# Patient Record
Sex: FEMALE | Race: WHITE | NOT HISPANIC OR LATINO | Employment: FULL TIME | URBAN - METROPOLITAN AREA
[De-identification: names, ages, dates, MRNs, and addresses within clinical notes are randomized per-mention and may not be internally consistent; named-entity substitution may affect disease eponyms.]

---

## 2017-09-27 NOTE — TELEPHONE ENCOUNTER
Last OV was 8/19/16. TC to patient. Patient did not request refill.  [facility].   Hafsa Heart RN-BSN

## 2018-06-18 ENCOUNTER — OFFICE VISIT (OUTPATIENT)
Dept: MIDWIFE SERVICES | Facility: CLINIC | Age: 38
End: 2018-06-18
Payer: COMMERCIAL

## 2018-06-18 VITALS
TEMPERATURE: 97.9 F | BODY MASS INDEX: 28.29 KG/M2 | HEART RATE: 80 BPM | WEIGHT: 170 LBS | DIASTOLIC BLOOD PRESSURE: 79 MMHG | SYSTOLIC BLOOD PRESSURE: 129 MMHG

## 2018-06-18 DIAGNOSIS — Z30.09 FAMILY PLANNING COUNSELING: Primary | ICD-10-CM

## 2018-06-18 PROCEDURE — 99213 OFFICE O/P EST LOW 20 MIN: CPT | Performed by: ADVANCED PRACTICE MIDWIFE

## 2018-06-18 NOTE — NURSING NOTE
"Chief Complaint   Patient presents with     Family Planning       Initial /79  Pulse 80  Temp 97.9  F (36.6  C) (Oral)  Wt 170 lb (77.1 kg)  BMI 28.29 kg/m2 Estimated body mass index is 28.29 kg/(m^2) as calculated from the following:    Height as of 16: 5' 5\" (1.651 m).    Weight as of this encounter: 170 lb (77.1 kg).  BP completed using cuff size: regular        The following HM Due: NONE      The following patient reported/Care Every where data was sent to:  P ABSTRACT QUALITY INITIATIVES [36418]  na      n/a              "

## 2018-06-18 NOTE — MR AVS SNAPSHOT
"              After Visit Summary   2018    Laurie Diaz    MRN: [id number]           Patient Information     Date Of Birth          1980        Visit Information        Provider Department      2018 3:30 PM Joanne Spencer APRN CNM [facility]        Today's Diagnoses     Family planning counseling    -  1       Follow-ups after your visit        Who to contact     If you have questions or need follow up information about today's clinic visit or your schedule please contact [facility] directly at [phone].  Normal or non-critical lab and imaging results will be communicated to you by Dokkankomhart, letter or phone within 4 business days after the clinic has received the results. If you do not hear from us within 7 days, please contact the clinic through Anatexist or phone. If you have a critical or abnormal lab result, we will notify you by phone as soon as possible.  Submit refill requests through StreetHawk or call your pharmacy and they will forward the refill request to us. Please allow 3 business days for your refill to be completed.          Additional Information About Your Visit        MyChart Information     StreetHawk lets you send messages to your doctor, view your test results, renew your prescriptions, schedule appointments and more. To sign up, go to [email or web address] . Click on \"Log in\" on the left side of the screen, which will take you to the Welcome page. Then click on \"Sign up Now\" on the right side of the page.     You will be asked to enter the access code listed below, as well as some personal information. Please follow the directions to create your username and password.     Your access code is: [email or web address]  Expires: 2018  3:22 PM     Your access code will  in 90 days. If you need help or a new code, please call your [facility] or [phone].        Care EveryWhere ID     This is your Care EveryWhere ID. This could be used " by other organizations to access your [place] medical records  [id number]        Your Vitals Were     Pulse Temperature BMI (Body Mass Index)             80 97.9  F (36.6  C) (Oral) 28.29 kg/m2          Blood Pressure from Last 3 Encounters:   06/18/18 129/79   08/19/16 108/70   07/11/16 116/80    Weight from Last 3 Encounters:   06/18/18 170 lb (77.1 kg)   08/19/16 169 lb 9.6 oz (76.9 kg)   07/05/16 193 lb (87.5 kg)              Today, you had the following     No orders found for display       Primary Care Provider Office Phone # Fax #    [facility] [phone]       [address] [place] [address]        Equal Access to Services     JAYNE CANDELARIO : Macario doll Soreji, waaxda luqadaha, qaybta kaalmada adepreetyakavita, tere smallwood . So [facility] [phone].    ATENCIÓN: Si habla español, tiene a fleming disposición servicios gratuitos de asistencia lingüística. Mike al [phone].    We comply with applicable federal civil rights laws and [place] laws. We do not discriminate on the basis of race, color, national origin, age, disability, sex, sexual orientation, or gender identity.            Thank you!     Thank you for choosing [facility]  for your care. Our goal is always to provide you with excellent care. Hearing back from our patients is one way we can continue to improve our services. Please take a few minutes to complete the written survey that you may receive in the mail after your visit with us. Thank you!             Your Updated Medication List - Protect others around you: Learn how to safely use, store and throw away your medicines at www.disposemymeds.org.          This list is accurate as of 6/18/18 11:59 PM.  Always use your most recent med list.                   Brand Name Dispense Instructions for use Diagnosis    levothyroxine 25 MCG tablet    SYNTHROID/LEVOTHROID    90 tablet    Take 1 tablet (25 mcg)  by mouth daily    Congenital hypothyroidism with diffuse goiter       prenatal multivitamin plus iron 27-0.8 MG Tabs per tablet      Take 1 tablet by mouth daily

## 2018-06-18 NOTE — PROGRESS NOTES
"Laurie Diaz is a 37 year old woman who presents to the clinic for a discussion regarding family planning. She reports having increased anxiety and not knowing what is safe or unsafe during the conception period. She recently was gardening and then learned that she could get toxoplasmosis from dirt. She would like to know if she should be tested for such. Also what else should she avoid. She has a daughter who is two years old and had a normal healthy pregnancy and delivery with her. She states that she \"doesn't know why she is so worried with this one\". Last time she conceived in the first cycle. She has been trying to conceive for 2 or 3 cycles. She would like to know how long she should try before seeing someone for fertility and \"does fertility really drop off at age 37?\"  She is charting her menstrual cycle and it has been regular. She is taking a prenatal vitamin. Neither she or her  have been in any areas where the Zika virus is prevalent.    O: /79  Pulse 80  Temp 97.9  F (36.6  C) (Oral)  Wt 170 lb (77.1 kg)  BMI 28.29 kg/m2    No physical exam today. She is due for her pap next month but as she is in her fertile week and trying to conceive, she would like to defer until a later time.       A: Conception counseling.     P: We discussed the small risk of toxoplasmosis. Other than gardening she has no other risk factors. I did not recommend serologic tests at this point but did recommend using gardening gloves and washing her hands afterwards. Otherwise we discussed strategies to decrease the likelihood of food borne illnesses, exercise, fish consumption, that it was safe to drink moderate amounts of alcohol until a positive pregnancy test and the importance of folic acid and she will continue taking her PNVs. We discussed Zika. I recommended that she chart her cycle and consider an ovulation kit. If she has not had a positive UPT after 6 months of trying she should see one of our OB/GYNs for " a fertility consult. She should call the clinic with a positive UPT. We also discussed pregnancy after 35 yrs old and early screening tests and a level 2 ultrasound.     Joanne Spencer CNM

## 2018-09-10 ENCOUNTER — TELEPHONE (OUTPATIENT)
Dept: OBGYN | Facility: CLINIC | Age: 38
End: 2018-09-10

## 2018-09-10 ENCOUNTER — TELEPHONE (OUTPATIENT)
Dept: MIDWIFE SERVICES | Facility: CLINIC | Age: 38
End: 2018-09-10

## 2018-09-10 NOTE — TELEPHONE ENCOUNTER
Patient calling regarding inhaling windshield washer fluid that had spilled in her car x 1 week in pregnancy - approximately 5 weeks (has not been seen yet for care). Did research online and stated that it was toxic for her and could cause birth defects in baby. Explained to her as long as he is not currently having symptoms: HA, visual changes, respiratory problems, lightheadedness okay to monitor. D/t stage of pregnancy, most likely not harmful, but will send a message to on call midwife for recommendations. Please advise. Thanks!  Sweta Peñaloza

## 2018-09-10 NOTE — TELEPHONE ENCOUNTER
Called Laurie regarding concerns about inhaling spilled windshield washer fluid.  She is feeling well.  We discussed that from my research it is mostly alcohol and has to be filtered through many of the body's systems to get to the developing baby.  Especially if she was feeling no ill effects it is unlikely to reach/effect the baby.  She verbalized understanding and relief.  I asked her to please call back if she continues to feel worried.

## 2018-09-18 ENCOUNTER — MYC MEDICAL ADVICE (OUTPATIENT)
Dept: MIDWIFE SERVICES | Facility: CLINIC | Age: 38
End: 2018-09-18

## 2018-09-18 NOTE — TELEPHONE ENCOUNTER
From: Laurie Diaz  To: Thalia Villalobos APRN CNM  Sent: 9/18/2018 9:55 AM CDT  Subject: Question about an upcoming visit    Hello,     I am sorry to be a bother. I have my first prenatal appointment tomorrow with the nurse. I just found out about congenital cmv and I am freaking out. I have a two year old at home with whom I share food and drinks. (but I will stop now) Could I please have blood work done tomorrow when I am there to check for cmv antibodies? Knowing if I have already been exposed and am immune or if I may have had a recent infection would really help me feel more comfortable and I would greatly appreciate it. I of course will discuss my concerns with the nurse tomorrow. I really hope this is possible, please let me know. Thank you,

## 2018-09-19 ENCOUNTER — PRENATAL OFFICE VISIT (OUTPATIENT)
Dept: NURSING | Facility: CLINIC | Age: 38
End: 2018-09-19
Payer: COMMERCIAL

## 2018-09-19 VITALS
HEART RATE: 80 BPM | TEMPERATURE: 98.4 F | DIASTOLIC BLOOD PRESSURE: 78 MMHG | WEIGHT: 168.3 LBS | SYSTOLIC BLOOD PRESSURE: 124 MMHG | HEIGHT: 65 IN | BODY MASS INDEX: 28.04 KG/M2

## 2018-09-19 DIAGNOSIS — F41.9 ANXIETY: ICD-10-CM

## 2018-09-19 DIAGNOSIS — Z23 NEED FOR TDAP VACCINATION: ICD-10-CM

## 2018-09-19 DIAGNOSIS — O09.529 AMA (ADVANCED MATERNAL AGE) MULTIGRAVIDA 35+: ICD-10-CM

## 2018-09-19 LAB
ABO + RH BLD: NORMAL
ABO + RH BLD: NORMAL
ALBUMIN UR-MCNC: NEGATIVE MG/DL
APPEARANCE UR: CLEAR
BETA HCG QUAL IFA URINE: POSITIVE
BILIRUB UR QL STRIP: NEGATIVE
BLD GP AB SCN SERPL QL: NORMAL
BLOOD BANK CMNT PATIENT-IMP: NORMAL
COLOR UR AUTO: YELLOW
ERYTHROCYTE [DISTWIDTH] IN BLOOD BY AUTOMATED COUNT: 12.5 % (ref 10–15)
GLUCOSE UR STRIP-MCNC: NEGATIVE MG/DL
HCT VFR BLD AUTO: 38.8 % (ref 35–47)
HGB BLD-MCNC: 13.2 G/DL (ref 11.7–15.7)
HGB UR QL STRIP: NEGATIVE
KETONES UR STRIP-MCNC: NEGATIVE MG/DL
LEUKOCYTE ESTERASE UR QL STRIP: NEGATIVE
MCH RBC QN AUTO: 31.3 PG (ref 26.5–33)
MCHC RBC AUTO-ENTMCNC: 34 G/DL (ref 31.5–36.5)
MCV RBC AUTO: 92 FL (ref 78–100)
NITRATE UR QL: NEGATIVE
PH UR STRIP: 6.5 PH (ref 5–7)
PLATELET # BLD AUTO: 303 10E9/L (ref 150–450)
RBC # BLD AUTO: 4.22 10E12/L (ref 3.8–5.2)
SOURCE: NORMAL
SP GR UR STRIP: <=1.005 (ref 1–1.03)
SPECIMEN EXP DATE BLD: NORMAL
UROBILINOGEN UR STRIP-ACNC: 0.2 EU/DL (ref 0.2–1)
WBC # BLD AUTO: 10.8 10E9/L (ref 4–11)

## 2018-09-19 PROCEDURE — 86900 BLOOD TYPING SEROLOGIC ABO: CPT | Performed by: ADVANCED PRACTICE MIDWIFE

## 2018-09-19 PROCEDURE — 84443 ASSAY THYROID STIM HORMONE: CPT | Performed by: ADVANCED PRACTICE MIDWIFE

## 2018-09-19 PROCEDURE — 86850 RBC ANTIBODY SCREEN: CPT | Performed by: ADVANCED PRACTICE MIDWIFE

## 2018-09-19 PROCEDURE — 87086 URINE CULTURE/COLONY COUNT: CPT | Performed by: ADVANCED PRACTICE MIDWIFE

## 2018-09-19 PROCEDURE — 87389 HIV-1 AG W/HIV-1&-2 AB AG IA: CPT | Performed by: ADVANCED PRACTICE MIDWIFE

## 2018-09-19 PROCEDURE — 86901 BLOOD TYPING SEROLOGIC RH(D): CPT | Performed by: ADVANCED PRACTICE MIDWIFE

## 2018-09-19 PROCEDURE — 86644 CMV ANTIBODY: CPT | Performed by: ADVANCED PRACTICE MIDWIFE

## 2018-09-19 PROCEDURE — 36415 COLL VENOUS BLD VENIPUNCTURE: CPT | Performed by: ADVANCED PRACTICE MIDWIFE

## 2018-09-19 PROCEDURE — 86780 TREPONEMA PALLIDUM: CPT | Performed by: ADVANCED PRACTICE MIDWIFE

## 2018-09-19 PROCEDURE — 99207 ZZC NO CHARGE NURSE ONLY: CPT

## 2018-09-19 PROCEDURE — 85027 COMPLETE CBC AUTOMATED: CPT | Performed by: ADVANCED PRACTICE MIDWIFE

## 2018-09-19 PROCEDURE — 84703 CHORIONIC GONADOTROPIN ASSAY: CPT | Performed by: ADVANCED PRACTICE MIDWIFE

## 2018-09-19 PROCEDURE — 87340 HEPATITIS B SURFACE AG IA: CPT | Performed by: ADVANCED PRACTICE MIDWIFE

## 2018-09-19 PROCEDURE — 81003 URINALYSIS AUTO W/O SCOPE: CPT | Performed by: ADVANCED PRACTICE MIDWIFE

## 2018-09-19 PROCEDURE — 86762 RUBELLA ANTIBODY: CPT | Performed by: ADVANCED PRACTICE MIDWIFE

## 2018-09-19 NOTE — MR AVS SNAPSHOT
After Visit Summary   9/19/2018    Laurie Diaz    MRN: [id number]           Patient Information     Date Of Birth          1980        Visit Information        Provider Department      9/19/2018 2:30 PM RD OB NURSE EDUCATION [facility]        Today's Diagnoses     AMA (advanced maternal age) multigravida 35+        Need for Tdap vaccination        Anxiety           Follow-ups after your visit        Additional Services     MAT FETAL MED CTR REFERRAL-PREGNANCY       Body mass index is 28.01 kg/(m^2).    >> Patient may proceed with recommendations for further testing as directed by the Maternal Fetal Medicine Specialist >>    >> If requesting Fetal Echo: MFM will determine appropriate location for exam due to indication.    >> If requesting Lung Maturity Amnio:  If results indicate fetal lung maturity, induction or C/S is recommended within 36 hours.  Please schedule accordingly.     Dear Patient:   Please be aware that coverage of these services is subject to the terms and limitations of your health insurance plan.  Call member services at your health plan with any benefit or coverage questions.      Please bring the following to your appointment:    >>  Any x-rays, CTs or MRIs which have been performed.  Contact the facility where they were done to arrange for  prior to your scheduled appointment.  Any new CT, MRI or other procedures ordered by your specialist must be performed at a [place] facility or coordinated by your clinic's referral office.  >>  List of current medications   >>  This referral request   >>  Any documents/labs given to you for this referral                  Your next 10 appointments already scheduled     Oct 03, 2018  4:00 PM CDT   US OB < 14 WEEKS SINGLE with RDUS1   [facility] ([facility])    [address] [place] [address]  [address] [place] [id number] [phone]           How do I prepare for my exam? (Food and drink  instructions) Drink four 8-ounce glasses of fluid an hour before your exam. If you need to empty your bladder before your exam, try to release only a little urine. Then, drink another glass of fluid.  How do I prepare for my exam? (Other instructions) You may have up to two family members in the exam room. If you bring a small child, an adult must be there to care for him or her. No video or camera photography during the procedure.  What should I wear: Wear comfortable clothes.  How long does the exam take: Most ultrasounds take 30 to 60 minutes.  What should I bring: Bring a list of your medicines, including vitamins, minerals and over-the-counter drugs. It is safest to leave personal items at home.  Do I need a :  No  is needed.  What do I need to tell my doctor: Tell your doctor about any allergies you may have.  What should I do after the exam: No restrictions, You may resume normal activities.  What is this test: An ultrasound uses sound waves to make pictures of the body. Sound waves do not cause pain. The only discomfort may be the pressure of the wand against your skin or full bladder.  Who should I call with questions: If you have any questions, please call the Imaging Department where you will have your exam. Directions, parking instructions, and other information is available on our website, [email or web address].            Oct 03, 2018  4:45 PM CDT   SHORT with NIRAJ Aaron CNM   [facility]    [address] [place] [address] [address]  [facility] [id number] [phone]            Oct 17, 2018  3:45 PM CDT   New Prenatal with NIRAJ Darden CNM   [facility] ([facility]    [address] [place] [address] [address]  [facility] [id number] [phone]              Future tests that were ordered for you today     Open Future Orders        Priority Expected Expires Ordered    US OB < 14 Weeks Single  "Routine  9/19/2019 9/19/2018            Who to contact     If you have questions or need follow up information about today's clinic visit or your schedule please contact [facility] directly at [phone].  Normal or non-critical lab and imaging results will be communicated to you by MyChart, letter or phone within 4 business days after the clinic has received the results. If you do not hear from us within 7 days, please contact the clinic through Saranashart or phone. If you have a critical or abnormal lab result, we will notify you by phone as soon as possible.  Submit refill requests through Theorem or call your pharmacy and they will forward the refill request to us. Please allow 3 business days for your refill to be completed.          Additional Information About Your Visit        Saranashart Information     Theorem gives you secure access to your electronic health record. If you see a primary care provider, you can also send messages to your care team and make appointments. If you have questions, please call your primary care clinic.  If you do not have a primary care provider, please call [phone] and they will assist you.        Care EveryWhere ID     This is your Care EveryWhere ID. This could be used by other organizations to access your [place] medical records  [id number]        Your Vitals Were     Pulse Temperature Height Last Period Breastfeeding? BMI (Body Mass Index)    80 98.4  F (36.9  C) 5' 5\" (1.651 m) 08/01/2018 No 28.01 kg/m2       Blood Pressure from Last 3 Encounters:   09/19/18 124/78   06/18/18 129/79   08/19/16 108/70    Weight from Last 3 Encounters:   09/19/18 168 lb 4.8 oz (76.3 kg)   06/18/18 170 lb (77.1 kg)   08/19/16 169 lb 9.6 oz (76.9 kg)              We Performed the Following     ABO/Rh type and screen     Beta HCG Qual, Urine - [facility] and [place] ([id number])     CBC with platelets     CMV Antibody IgG     Hepatitis B surface antigen     HIV Antigen Antibody " Combo     MAT FETAL MED CTR REFERRAL-PREGNANCY     Rubella Antibody IgG Quantitative     Treponema Abs w Reflex to RPR and Titer     TSH with free T4 reflex     UA without Microscopic     Urine Culture Aerobic Bacterial        Primary Care Provider Office Phone # Fax #    [facility] [phone]       [address] [address]        Equal Access to Services     [facility] FATUMAR : Hadii aad ku hadasho Soomaali, waaxda luqadaha, qaybta kaalmada adeegyada, waxay idiin hayaan adeeg kharash la'aan . So [facility] [phone].    ATENCIÓN: Si habla español, tiene a fleming disposición servicios gratuitos de asistencia lingüística. Mike al [phone].    We comply with applicable federal civil rights laws and [place] laws. We do not discriminate on the basis of race, color, national origin, age, disability, sex, sexual orientation, or gender identity.            Thank you!     Thank you for choosing [facility]  for your care. Our goal is always to provide you with excellent care. Hearing back from our patients is one way we can continue to improve our services. Please take a few minutes to complete the written survey that you may receive in the mail after your visit with us. Thank you!             Your Updated Medication List - Protect others around you: Learn how to safely use, store and throw away your medicines at www.disposemymeds.org.          This list is accurate as of 9/19/18  3:17 PM.  Always use your most recent med list.                   Brand Name Dispense Instructions for use Diagnosis    levothyroxine 25 MCG tablet    SYNTHROID/LEVOTHROID    90 tablet    Take 1 tablet (25 mcg) by mouth daily    Congenital hypothyroidism with diffuse goiter       prenatal multivitamin plus iron 27-0.8 MG Tabs per tablet      Take 1 tablet by mouth daily

## 2018-09-19 NOTE — PROGRESS NOTES
Important Information for Provider:     Patient presents for new ob teaching and labs, second pregnancy, AMA. Ordered first  trimester screening. Patient had some spotting before last period. Ultrasound scheduled for 10/03/18 with CNM to review after. NOB visit 10/17/18 with CNM. Patient extremely high anxiety with this pregnancy, answered all questions, recommended the book Panic Free Pregnancy. TSH drawn today with NOB labs    Caffeine intake/servings daily - 0  Calcium intake/servings daily - 3  Exercise 5 times weekly - describe ; walks, precautions given  Sunscreen used - Yes  Seatbelts used - Yes  Guns stored in the home - No  Self Breast Exam - Yes  Pap test up to date -  Yes  Eye exam up to date -  Yes  Dental exam up to date -  Yes  Immunizations reviewed and up to date - Yes  Abuse: Current or Past (Physical, Sexual or Emotional) - No  Do you feel safe in your environment - Yes  Do you cope well with stress - No  Do you suffer from insomnia - No    .       Prenatal OB Questionnaire  Patient supplied answers from flow sheet for:  Prenatal OB Questionnaire.  Past Medical History  Diabetes?: No  Hypertension : No  Heart disease, mitral valve prolapse or rheumatic fever?: No  An autoimmune disease such as lupus or rheumatoid arthritis?: No  Kidney disease or urinary tract infection?: No  Epilepsy, seizures or spells?: No  Migraine headaches?: No  A stroke or loss of function or sensation?: No  Any other neurological problems?: No  Have you ever been treated for depression?: No  Are you having problems with crying spells or loss of self-esteem?: No  Have you ever required psychiatric care?: No  Have you ever had hepatitis, liver disease or jaundice?: No  Have you been treated for blood clots in your veins, deep vein thrombosis, inflammation in the veins, thrombosis, phlebitis, pulmonary embolism or varicosities?: No  Have you had excessive bleeding after surgery or dental work?: No  Do you bleed more than  other women after a cut or scratch?: No  Do you have a history of anemia?: No  Have you ever had thyroid problems or taken thyroid medication?: (!) Yes (hypothyroidism, takes levothyroxine)  Have you ever been in a major accident or suffered serious trauma?: No  Within the last year, has anyone hit, slapped, kicked or otherwise hurt you?: No  In the last year, has anyone forced you to have sex when you didn't want to?: No    Past Medical History 2   Have you ever received a blood transfusion?: No  Would you refuse a blood transfusion if a doctor judged it to be medically necessary?: No   If you answered Yes, would you rather die than receive a blood transfusion?: No  If you answered Yes, is this for Baptism reasons?: No  Does anyone in your home smoke?: (!) Yes  Do you use tobacco products?: No  Do you drink beer, wine or hard liquor?: No  Do you use any of the following: marijuana, speed, cocaine, heroin, hallucinogens or other drugs?: No   Is your blood type Rh negative?: No  Have you ever had abnormal antibodies in your blood?: No  Have you ever had asthma?: No  Have you ever had tuberculosis?: No  Do you have any allergies to drugs or over-the-counter medications?: No  Allergies: Dust Mites, Aspartame, Ethanol, Venlafaxine, Hydrochloride, Sertraline: No  Have you had any breast problems?: No  Have you ever ?: (!) Yes  Have you had any gynecological surgical procedures such as cervical conization, a LEEP procedure, laser treatment, cryosurgery of the cervix or a dilation and curettage, etc?: No  Have you ever had any other surgical procedures?: No  Have you been hospitalized for a nonsurgical reason excluding normal delivery?: No  Have you ever had any anesthetic complications?: No  Have you ever had an abnormal pap smear?: (!) Yes    Past Medical History (Continued)  Do you have a history of abnormalities of the uterus?: No  Did your mother take KAROL or any other hormones when she was pregnant with  you?: No  Did it take you more than a year to become pregnant?: No  Have you ever been evaluated or treated for infertility?: No  Is there a history of medical problems in your family, which you feel may be important to this pregnancy?: No  Do you have any other problems we have not asked about which you feel may be important to this pregnancy?: No    Symptoms since last menstrual period  Do you have any of the following symptoms: abdominal pain, blood in stools or urine, chest pain, shortness of breath, coughing or vomiting up blood, your heart racing or skipping beats, nausea and vomiting, pain on urination or vaginal discharge or bleed: No  Will the patient be 35 years old or older at the time of delivery?: (!) Yes    Has the patient, baby's father or anyone in either family had:  Thalassemia (Italian, Greek, Mediterranean or  background only) and an MCV result less than 80?: No  Neural tube defect such as meningomyelocele, spina bifida or anencephaly?: No  Congenital heart defect?: No  Down's Syndrome?: No  Rudy-Sachs disease (Latter day, Cajun, Afghan-Malawian)?: No  Sickle cell disease or trait ()?: No  Hemophilia or other inherited problems of blood?: No  Muscular dystrophy?: No  Cystic fibrosis?: No  Estero's chorea?: No  Mental retardation/autism?: (!) Yes ('s brother is mentally challenged)  If yes, was the person tested for fragile X?: No  Any other inherited genetic or chromosomal disorder?: No  Maternal metabolic disorder (e.g Insulin-dependent diabetes, PKU)?: No  A child with birth defects not listed above?: No  Recurrent pregnancy loss or stillbirth?: No   Has the patient had any medications/street drugs/alcohol since her last menstrual period?: No  Does the patient or baby's father have any other genetic risks?: No    Infection History   Do you object to being tested for Hepatitis B?: No  Do you object to being tested for HIV?: No   Do you feel that you are at high risk for coming  in contact with the AIDS virus?: No  Have you ever been treated for tuberculosis?: No  Have you ever had a positive skin test for tuberculosis?: No  Do you live with someone who has tuberculosis?: No  Have you ever been exposed to tuberculosis?: No  Do you have genital herpes?: No  Does your partner have genital herpes?: No  Have you had a viral illness since your last period?: No  Have you ever had gonorrhea, chlamydia, syphilis, venereal warts, trichomoniasis, pelvic inflammatory disease or any other sexually transmitted disease?: No  Do you know if you are a genital group B streptococcus carrier?: No  Have you had chicken pox/varicella?: (!) Yes (titer drawn, immuned)   Have you been vaccinated against chicken Pox?: No  Have you had any other infectious diseases?: No      Allergies as of 9/19/2018:    Allergies as of 09/19/2018     (No Known Allergies)       Current medications are:  Current Outpatient Prescriptions   Medication Sig Dispense Refill     levothyroxine (SYNTHROID, LEVOTHROID) 25 MCG tablet Take 1 tablet (25 mcg) by mouth daily 90 tablet 3     Prenatal Vit-Fe Fumarate-FA (PRENATAL MULTIVITAMIN  PLUS IRON) 27-0.8 MG TABS Take 1 tablet by mouth daily           Early ultrasound screening tool:    Does patient have irregular periods?  No  Did patient use hormonal birth control in the three months prior to positive urine pregnancy test? No  Is the patient breastfeeding?  No  Is the patient 10 weeks or greater at time of education visit?  No

## 2018-09-20 LAB
BACTERIA SPEC CULT: NO GROWTH
CMV IGG SERPL QL IA: <0.2 AI (ref 0–0.8)
HBV SURFACE AG SERPL QL IA: NONREACTIVE
HIV 1+2 AB+HIV1 P24 AG SERPL QL IA: NONREACTIVE
RUBV IGG SERPL IA-ACNC: 23 IU/ML
SPECIMEN SOURCE: NORMAL
T PALLIDUM AB SER QL: NONREACTIVE
TSH SERPL DL<=0.005 MIU/L-ACNC: 2.6 MU/L (ref 0.4–4)

## 2018-09-21 NOTE — PROGRESS NOTES
Dear Laurie,    Your test results are attached below. Great news. Your lab results are all normal and reassuring. We will discuss them in more detail at your next appointment.  If you have any questions, please contact me via Shypt or you can call our office at [phone].    Joanne Rashid CNM, JOSH

## 2018-09-25 ENCOUNTER — HEALTH MAINTENANCE LETTER (OUTPATIENT)
Age: 38
End: 2018-09-25

## 2018-10-03 ENCOUNTER — RADIANT APPOINTMENT (OUTPATIENT)
Dept: ULTRASOUND IMAGING | Facility: CLINIC | Age: 38
End: 2018-10-03
Attending: ADVANCED PRACTICE MIDWIFE
Payer: COMMERCIAL

## 2018-10-03 ENCOUNTER — OFFICE VISIT (OUTPATIENT)
Dept: MIDWIFE SERVICES | Facility: CLINIC | Age: 38
End: 2018-10-03
Payer: COMMERCIAL

## 2018-10-03 VITALS
BODY MASS INDEX: 28.12 KG/M2 | DIASTOLIC BLOOD PRESSURE: 78 MMHG | WEIGHT: 169 LBS | HEART RATE: 79 BPM | SYSTOLIC BLOOD PRESSURE: 127 MMHG

## 2018-10-03 DIAGNOSIS — O02.1 MISSED AB: ICD-10-CM

## 2018-10-03 DIAGNOSIS — O20.0 THREATENED ABORTION: Primary | ICD-10-CM

## 2018-10-03 DIAGNOSIS — O09.529 AMA (ADVANCED MATERNAL AGE) MULTIGRAVIDA 35+: ICD-10-CM

## 2018-10-03 LAB — B-HCG SERPL-ACNC: ABNORMAL IU/L (ref 0–5)

## 2018-10-03 PROCEDURE — 99213 OFFICE O/P EST LOW 20 MIN: CPT | Performed by: ADVANCED PRACTICE MIDWIFE

## 2018-10-03 PROCEDURE — 84702 CHORIONIC GONADOTROPIN TEST: CPT | Performed by: ADVANCED PRACTICE MIDWIFE

## 2018-10-03 PROCEDURE — 76817 TRANSVAGINAL US OBSTETRIC: CPT | Performed by: OBSTETRICS & GYNECOLOGY

## 2018-10-03 PROCEDURE — 36415 COLL VENOUS BLD VENIPUNCTURE: CPT | Performed by: ADVANCED PRACTICE MIDWIFE

## 2018-10-03 NOTE — MR AVS SNAPSHOT
After Visit Summary   10/3/2018    Laurie Diaz    MRN: [id number]           Patient Information     Date Of Birth          1980        Visit Information        Provider Department      10/3/2018 4:45 PM Thalia Villalobos APRN CNM [facility]        Today's Diagnoses     Threatened     -  1    Missed ab           Follow-ups after your visit        Your next 10 appointments already scheduled     Oct 17, 2018  3:45 PM CDT   New Prenatal with NIRAJ Darden CNM   [facility]    [address] [place] [address]   [phone]            Oct 23, 2018  1:30 PM CDT   Genetic Counseling with RH GEN COUNSELOR 2   [facility])    [address]  [facility] [address]   [phone]            Oct 23, 2018  2:15 PM CDT   MFM NUCHAL TRANSLUCENCY with [id number]   [facility] Maternal [facility]etal Medicine Ultrasound - [facility])    [address]  [facility] [address]   [phone]            Oct 23, 2018  2:45 PM CDT   Radiology MD with RH MFM MD   [facility])    [address]  [facility] [address]   [phone]           Please arrive at the time given for your first appointment. This visit is used internally to schedule the physician's time during your ultrasound.              Future tests that were ordered for you today     Open Future Orders        Priority Expected Expires Ordered    HCG Quantitative Pregnancy, Blood ([id number]) Routine  10/3/2019 10/3/2018            Who to contact     If you have questions or need follow up information about today's clinic visit or your schedule please contact [facility] directly at [phone].  Normal or non-critical lab and imaging results will be communicated  to you by [employer], letter or phone within 4 business days after the clinic has received the results. If you do not hear from us within 7 days, please contact the clinic through [employer] or phone. If you have a critical or abnormal lab result, we will notify you by phone as soon as possible.  Submit refill requests through [employer] or call your pharmacy and they will forward the refill request to us. Please allow 3 business days for your refill to be completed.          Additional Information About Your Visit        [employer]YourNextLeap Information     [employer] gives you secure access to your electronic health record. If you see a primary care provider, you can also send messages to your care team and make appointments. If you have questions, please call your primary care clinic.  If you do not have a primary care provider, please call [phone] and they will assist you.        Care EveryWhere ID     This is your Care EveryWhere ID. This could be used by other organizations to access your [facility] medical records  [id number]        Your Vitals Were     Pulse Last Period BMI (Body Mass Index)             79 08/01/2018 28.12 kg/m2          Blood Pressure from Last 3 Encounters:   10/03/18 127/78   09/19/18 124/78   06/18/18 129/79    Weight from Last 3 Encounters:   10/03/18 169 lb (76.7 kg)   09/19/18 168 lb 4.8 oz (76.3 kg)   06/18/18 170 lb (77.1 kg)              We Performed the Following     HCG Quantitative Pregnancy, Blood ([id number])        Primary Care Provider Office Phone # Fax #    [facility] [phone]       [address] [address]        Equal Access to Services     [facility] : Hadii aad ku hadasho Soomaali, waaxda luqadaha, qaybta kaalmada alan, tere ovalles. So [facility] [phone].    ATENCIÓN: Si habla español, tiene a fleming disposición servicios gratuitos de asistencia lingüística. Llame al [phone].    We comply with  applicable federal civil rights laws and [place] laws. We do not discriminate on the basis of race, color, national origin, age, disability, sex, sexual orientation, or gender identity.            Thank you!     Thank you for choosing [facility]  for your care. Our goal is always to provide you with excellent care. Hearing back from our patients is one way we can continue to improve our services. Please take a few minutes to complete the written survey that you may receive in the mail after your visit with us. Thank you!             Your Updated Medication List - Protect others around you: Learn how to safely use, store and throw away your medicines at www.disposemymeds.org.          This list is accurate as of 10/3/18  5:34 PM.  Always use your most recent med list.                   Brand Name Dispense Instructions for use Diagnosis    levothyroxine 25 MCG tablet    SYNTHROID/LEVOTHROID    90 tablet    Take 1 tablet (25 mcg) by mouth daily    Congenital hypothyroidism with diffuse goiter       prenatal multivitamin plus iron 27-0.8 MG Tabs per tablet      Take 1 tablet by mouth daily

## 2018-10-03 NOTE — PROGRESS NOTES
Laurie Diaz 37 year old Patient's last menstrual period was 08/01/2018.     CC: Early U/S review    HPI: Pt has a sure LMP date and sure conception date with EDC of 5/8/2019. (9w0d)    U/S today = 5w6d with EDC 5/30/2018 and no heartbeat noted    Discussed with pt probable miscarriage, but measured size does not meet criteria for diagnostic missed AB.    Offered appt with MD for tomorrow, pt has to take off from work for appts so concerned she would come and meet with MD and be told that she had to wait.    Offered beta hcg today and repeat on Monday 5 days from now.\    Reviewed 3 options once dx criteria met, pt would like to have D&C.  Goal is to try and conceive as soon as possible again.    Reviewed once dx criteria met then she will need to come for a consult with MD and then they would shcedule a D&C.    Pt works in [place] and would like to have follow up beta hcg drawn there, future order placed and instructed to call Monday evening for results.    > 50% of time was spent in counseling regarding missed AB/threatned AB.  Time spent in face to face discussion 15    Thalia Villalobos

## 2018-10-03 NOTE — Clinical Note
Tito Cherry Laurie is coming for a repeat beta hcg on Monday.  I told her to call and talk to you for results.  She is a probable SAB, I drew a beta hcg today, we're looking for a confirmation of the sab so she can move onto the next steps.  Thanks Miriam

## 2018-10-04 ENCOUNTER — MYC MEDICAL ADVICE (OUTPATIENT)
Dept: MIDWIFE SERVICES | Facility: CLINIC | Age: 38
End: 2018-10-04

## 2018-10-04 DIAGNOSIS — E03.9 HYPOTHYROIDISM, UNSPECIFIED TYPE: Primary | ICD-10-CM

## 2018-10-08 ENCOUNTER — TELEPHONE (OUTPATIENT)
Dept: MIDWIFE SERVICES | Facility: CLINIC | Age: 38
End: 2018-10-08

## 2018-10-08 DIAGNOSIS — O20.0 THREATENED ABORTION: ICD-10-CM

## 2018-10-08 DIAGNOSIS — Z34.81 ENCOUNTER FOR SUPERVISION OF OTHER NORMAL PREGNANCY IN FIRST TRIMESTER: Primary | ICD-10-CM

## 2018-10-08 DIAGNOSIS — E03.9 HYPOTHYROIDISM, UNSPECIFIED TYPE: ICD-10-CM

## 2018-10-08 LAB
B-HCG SERPL-ACNC: ABNORMAL IU/L (ref 0–5)
TSH SERPL DL<=0.005 MIU/L-ACNC: 3.57 MU/L (ref 0.4–4)

## 2018-10-08 PROCEDURE — 84443 ASSAY THYROID STIM HORMONE: CPT | Performed by: ADVANCED PRACTICE MIDWIFE

## 2018-10-08 PROCEDURE — 84702 CHORIONIC GONADOTROPIN TEST: CPT | Performed by: ADVANCED PRACTICE MIDWIFE

## 2018-10-08 PROCEDURE — 36415 COLL VENOUS BLD VENIPUNCTURE: CPT | Performed by: ADVANCED PRACTICE MIDWIFE

## 2018-10-08 NOTE — TELEPHONE ENCOUNTER
Patient calling regarding lab results. Pt states that JR said to call clinic and ask that the on-call CNM be paged to call her with results. Routing to . Please call patient at [phone].  Hafsa Heart RN-BSN

## 2018-10-09 ENCOUNTER — NURSE TRIAGE (OUTPATIENT)
Dept: NURSING | Facility: CLINIC | Age: 38
End: 2018-10-09

## 2018-10-09 NOTE — TELEPHONE ENCOUNTER
Called patient to discuss serum HCG from today. Hcg today was 94750 which is slightly increased from 01228 on 10/3 (5 days ago). Laurie had been monitoring her ovulation and has a certain LMP and conception date which makes her 9w5d today. She denies any intercourse since her conception date and does not feel like this could be a pregnancy from a following cycle. On an ultrasound last week the CRL <7mm with a yolk sac and fetal pole without cardiac activity. Based on this information the diagnosis of early pregnancy loss could not yet be made. She believes this is a missed AB with good certainty and would like a D&C done at the earliest appointment. However we discussed that despite all the evidence that this is a non-viable pregnancy we can not make a definitive diagnosis. Furthermore, because it is a wanted pregnancy we need to wait the 11 days and repeat the sonogram.      On 10/2 a Viability ultrasound was done with the following results      Impression:    Early andre IUP with yolk sac measures 5w 6d by today's ultrasound, EDC 5/30/19.  Fetal cardiac activity cannot be confirmed by this ultrasound.  It is possible this is due to  early gestational age, but given the dating discrepancy, this is suspicious for a non-viable pregnancy.    Recommend repeat ultrasound in 7-10 days to confirm viability.    I ordered a viability ultrasound to be done on Friday 10/12 and a consult with one of our OB/GYNs to discuss the results and likely offer her a D&C. She is hopeful to have a D&C if it is needed on Saturday 10/13 if it is possible or Monday 10/15. All her questions were answered and the appts were scheduled. She is Rh+ so does not need Rhogam.     Joanne Spencer, JOSH

## 2018-10-09 NOTE — TELEPHONE ENCOUNTER
Will route to on call MD for their recommendations - will call patient back with plan of care based on midwife/MD instructions.  Sweta Peñaloza

## 2018-10-09 NOTE — TELEPHONE ENCOUNTER
Concur with plan and instructions on bleeding and when to report to ED.      Would still keep the appt for US on Friday unless convinced that has had SAB as pt desires a D and C when possible.        May want to consult with on call OB to see if patient could be offered D and C now since is having bleeding and is 7 days since last US.   Fabian Nelson APRN, CNM

## 2018-10-09 NOTE — TELEPHONE ENCOUNTER
Patient saying she received a call from Dr. Jeffries with a message that she is on call tonight and to call her back.  Paged on call Dr. Jeffries to [phone] via smart web.  Erin Basurto RN  [facility]      Additional Information    Negative: Shock suspected (e.g., cold/pale/clammy skin, too weak to stand, low BP, rapid pulse)    Negative: Difficult to awaken or acting confused  (e.g., disoriented, slurred speech)    Negative: Passed out (i.e., lost consciousness, collapsed and was not responding)    Negative: Sounds like a life-threatening emergency to the triager    Negative: SEVERE abdominal pain    Negative: [1] SEVERE vaginal bleeding (i.e., soaking 2 pads / hour, large blood clots) AND [2] present 2 or more hours    Negative: [1] MODERATE vaginal bleeding (i.e., soaking 1 pad / hour; clots) AND [2] present > 6 hours    Negative: [1] MODERATE vaginal bleeding (i.e., soaking 1 pad / hour; clots) AND [2] pregnant > 12 weeks    Negative: Passed tissue (e.g., gray-white)    Negative: Shoulder pain    Protocols used: PREGNANCY - VAGINAL BLEEDING LESS THAN 20 WEEKS EGA-ADULT-

## 2018-10-09 NOTE — TELEPHONE ENCOUNTER
"Patient calling regarding bleeding that started today - anticipated miscarriage. Patient stated that she has changed a big period pad x4 today. Not having any cramping. No passing of clots. Is not lightheaded, dizzy, or SOB doing normal activities. No chest pain/heart palpitations. \"I feel fine actually\" per patient. Patient is just wondering next steps - she has an US scheduled for Friday - I am assuming she can cancel that? Do you want her to come back end of week or early next week to check hcg to assure it is declining? Anything else? Did discuss miscarriage precautions and when she should present to ER - soaking a pad an hour for several hours in a row, severe cramping, passing large blood clots or symptomatic for blood loss as described above. Please advise. Thanks!  Sweta Peñaloza    "

## 2018-10-10 ENCOUNTER — TELEPHONE (OUTPATIENT)
Dept: MIDWIFE SERVICES | Facility: CLINIC | Age: 38
End: 2018-10-10

## 2018-10-10 ENCOUNTER — HOSPITAL ENCOUNTER (OUTPATIENT)
Dept: ULTRASOUND IMAGING | Facility: CLINIC | Age: 38
Discharge: HOME | End: 2018-10-10
Attending: OBSTETRICS & GYNECOLOGY | Admitting: OBSTETRICS & GYNECOLOGY
Payer: COMMERCIAL

## 2018-10-10 DIAGNOSIS — N83.202 LEFT OVARIAN CYST: Primary | ICD-10-CM

## 2018-10-10 DIAGNOSIS — O20.0 THREATENED ABORTION: ICD-10-CM

## 2018-10-10 DIAGNOSIS — O20.0 THREATENED ABORTION: Primary | ICD-10-CM

## 2018-10-10 PROCEDURE — 76801 OB US < 14 WKS SINGLE FETUS: CPT

## 2018-10-10 NOTE — TELEPHONE ENCOUNTER
"I reviewed images and agree with report that there is no obvious retained POC, appears to have completed the miscarriage.  Most likely the \"thicker endometrium\" is blood that remains in the uterus since she is still lightly bleeding.  If she desires expt mgmt, she can just monitor bleeding going forward.  She can expect to have light bleeding/period type for even up to 1-2 weeks.  If it is decreasing over that time period, she doesn't need to do anything.  If her bleeding is increasing, she is having gushes, etc., she should call because we may want to schedule D&C at that point.  Thanks    PAVEL RÍOS MD    "

## 2018-10-10 NOTE — TELEPHONE ENCOUNTER
Pt calling for US results. Preliminary result is in patient's chart. Please review and advise. Thanks.   Hafsa Heart, RN-BSN

## 2018-10-10 NOTE — TELEPHONE ENCOUNTER
TC patient to clarify. Patient was bleeding all day yesterday from 7:30am-4pm. Pt was passing pretty big chunks, one was 2 inches long. Pt states that when she spoke to Dr Jeffries last night, she stated what she passed and bled was not enough and still would need the D&C. Pt is still bleeding today, but very light.  Hafsa Heart, RN-BSN

## 2018-10-10 NOTE — TELEPHONE ENCOUNTER
Based on that, it sounds like she may have passed everything.  I think it may be worthwhile to get her in for an ultrasound today to check to see.  If there is still an IUP, then we can go ahead and schedule the D&C. (or get the D&C scheduled but cancel if no IUP). Us order placed. Thanks    PAVEL RÍOS MD

## 2018-10-10 NOTE — TELEPHONE ENCOUNTER
Talked to patient.   Had about four pads that were not completely saturated. Not very uncomfortable.    Discussed typical course of spontaneous miscarriage, would expect more bleeding at discomfort at this gestation.     Patient is aware the findings on her last ultrasound were not 100% conclusive for miscarriage. She feels certain about her dating and that the discrepancy on ultrasound represents pregnancy loss.     We briefly discussed expectant management, misoprostol, or D&C. She thinks she would like D&C.    I discussed the possibility of adding it on tomorrow through Friday, on call provider would have to be agreeable to the plan. She is unsure what she wants to do at this moment.    I will have nurses call her first thing in the morning to see how she would like to proceed. She has ultrasound and clinic appointment scheduled for Friday but does not necessarily want to wait that long.

## 2018-10-10 NOTE — TELEPHONE ENCOUNTER
"When you say \"passed tissue\" do you mean she miscarried?  If so, then she does not need a D&C.  If she had some bleeding, then would likely still need.  Dr. Jeffries reported that she had some mild bleeding, so seems different? Can you clarify?    Thanks, PAVEL RÍOS MD    "

## 2018-10-10 NOTE — TELEPHONE ENCOUNTER
Patient calling regarding miscarriage. Patient passed tissue last night. Spoke with AO last night. Advised pt to call back today to let us know if and when she would like to do the D&C. Patient would like to do the D&C tomorrow. Routing to MAGDALENA since you are on-call. Can you place orders if ok? Thanks.   Hafsa Heart, LINUS-BSN

## 2018-10-10 NOTE — TELEPHONE ENCOUNTER
TC to patient. Discussed message below and recommendations. Pt stated understanding. Advised to call back with any questions or concerns or if bleeding is increasing.   Hafsa Heart RN-BSN

## 2018-10-10 NOTE — TELEPHONE ENCOUNTER
TC to radiology. Scheduled US at 1pm today. TC to patient. Discussed message below. Aware of appt at 1pm today for US. Discussed with pt they will call us at triage if US is abnormal and we will let her know the results.  Pt stated understanding.   Hafsa Heart, RN-BSN

## 2018-11-20 ENCOUNTER — OFFICE VISIT (OUTPATIENT)
Dept: OBGYN | Facility: CLINIC | Age: 38
End: 2018-11-20
Payer: COMMERCIAL

## 2018-11-20 VITALS — DIASTOLIC BLOOD PRESSURE: 70 MMHG | SYSTOLIC BLOOD PRESSURE: 124 MMHG | WEIGHT: 167 LBS | BODY MASS INDEX: 27.79 KG/M2

## 2018-11-20 DIAGNOSIS — N94.9 VAGINAL SYMPTOM: ICD-10-CM

## 2018-11-20 DIAGNOSIS — Z12.4 CERVICAL CANCER SCREENING: ICD-10-CM

## 2018-11-20 DIAGNOSIS — B37.31 YEAST INFECTION OF THE VAGINA: Primary | ICD-10-CM

## 2018-11-20 LAB
SPECIMEN SOURCE: NORMAL
WET PREP SPEC: NORMAL

## 2018-11-20 PROCEDURE — 87624 HPV HI-RISK TYP POOLED RSLT: CPT | Performed by: ADVANCED PRACTICE MIDWIFE

## 2018-11-20 PROCEDURE — 99213 OFFICE O/P EST LOW 20 MIN: CPT | Performed by: ADVANCED PRACTICE MIDWIFE

## 2018-11-20 PROCEDURE — G0145 SCR C/V CYTO,THINLAYER,RESCR: HCPCS | Performed by: ADVANCED PRACTICE MIDWIFE

## 2018-11-20 PROCEDURE — 87210 SMEAR WET MOUNT SALINE/INK: CPT | Performed by: ADVANCED PRACTICE MIDWIFE

## 2018-11-20 RX ORDER — CLOBETASOL PROPIONATE 0.5 MG/G
CREAM TOPICAL
COMMUNITY
Start: 2018-11-12 | End: 2019-11-18

## 2018-11-20 RX ORDER — FLUCONAZOLE 150 MG/1
150 TABLET ORAL ONCE
Qty: 1 TABLET | Refills: 0 | Status: SHIPPED | OUTPATIENT
Start: 2018-11-20 | End: 2019-02-01

## 2018-11-20 NOTE — NURSING NOTE
"Chief Complaint   Patient presents with     Vaginal Problem     x4 wks, worsening couple days       Initial /70 (BP Location: Left arm, Cuff Size: Adult Regular)  Wt 167 lb (75.8 kg)  LMP 11/15/2018 (Exact Date)  Breastfeeding? Unknown  BMI 27.79 kg/m2 Estimated body mass index is 27.79 kg/(m^2) as calculated from the following:    Height as of 18: 5' 5\" (1.651 m).    Weight as of this encounter: 167 lb (75.8 kg).  BP completed using cuff size: regular    Questioned patient about current smoking habits.  Pt. has never smoked.          The following HM Due: NONE    Jojo Toussaint CMA             "

## 2018-11-20 NOTE — PROGRESS NOTES
SUBJECTIVE: Laurie Diaz 37 year old female presents with abnormal vaginal/vulvar symptoms for 4 weeks.    Patient's last menstrual period was 11/15/2018 (exact date).   Vaginal/vulvar symptoms: describes as vulvar soreness and local irritation.  Noticed swelling. Denies discharge, urinary symptoms, abdominal pain, fever.     Other associated symptoms: none.    Predisposing factors: SAB 6 weeks ago     Hx of previous vaginitis: none    Sexually active: yes, single partner, contraception - none, trying to conceive       General medical, surgical, OB/Gyn and social histories   reviewed and updated in Histories section of [facility].     Review Of Systems  Skin: negative  Respiratory: No shortness of breath, dyspnea on exertion, cough, or hemoptysis  Cardiovascular: negative  Gastrointestinal: negative  Genitourinary: negative  Musculoskeletal: negative  Psychiatric: negative    OBJECTIVE:  Patient appears well, vital signs normal.  ABDOMEN: Soft without masses or tenderness.  No CVA tenderness.   External Genitalia: WNL    PELVIC EXAM:  Bartholin's/Urethral Meatus/[place]'s (BUS):  WNL/Negative  Bladder:  WNL  Vagina:  Normal mucosa, rugated and  tone:  Fair, anterior vaginal wall concave, not prolapsed, fair tone   Cervix:  anterior, multiparous, friable, cervical motion tenderness (CMT):  absent  Uterus: Normal shape, position and consistency, nontender   Adnexae:  Normal without masses or tenderness,bilaterally  Anus/Perineum:  Intact    WET PREP: no trichomonas, monilia, or clue cells   CULTURES: none indicated.    ASSESSMENT:   (B37.3) Yeast infection of the vagina  (primary encounter diagnosis)  Comment: ordered   Plan: fluconazole (DIFLUCAN) 150 MG tablet        -treat based on symptoms, instructed to return to clinic if symptoms do not improve.   -encouraged patient to keep annual appointment, re-assess friable cervical os likely hormonal     (Z12.4) Cervical cancer screening  Comment: ordered  Plan: Pap imaged  thin layer screen with HPV -         recommended age 30 - 65 years (select HPV order        below), HPV High Risk Types DNA Cervical        -results pending     (N94.9) Vaginal symptom  Comment: ordered   Plan: Wet prep, HPV High Risk Types DNA Cervical        Neg       PLAN:  Treatment plan per orders in [facility]. STD prevention discussed. Birth control needs reviewed.  Abstain from intercourse for duration of treatment. Return if   symptoms do not resolve as anticipated.    NIRAJ Yeager, CNM

## 2018-11-20 NOTE — MR AVS SNAPSHOT
After Visit Summary   11/20/2018    Laurie Diaz    MRN: [id number]           Patient Information     Date Of Birth          1980        Visit Information        Provider Department      11/20/2018 3:45 PM Anamaria Veronica APRN CNM [facility]        Today's Diagnoses     Yeast infection of the vagina    -  1    Cervical cancer screening        Vaginal symptom           Follow-ups after your visit        Follow-up notes from your care team     Return if symptoms worsen or fail to improve.      Your next 10 appointments already scheduled     Nov 26, 2018  3:30 PM CST   PHYSICAL with Ramona Ann Aaseby-Aguilera, PA-C   [facility]    [address] [id number] [phone]            Dec 03, 2018  4:00 PM CST   US TRANSVAGINAL NON OB with RDUS1   [facility] ([facility])    [address] [place] [id number] [phone]           How do I prepare for my exam? (Food and drink instructions) Drink four 8-ounce glasses of fluid. Finish drinking an hour before your exam, so you have a full bladder. If you need to empty your bladder before your exam, try to release only a little urine. Then, drink another glass of fluid.  How do I prepare for my exam? (Other instructions) You may have up to two family members in the exam room. If you bring a small child, an adult must be there to care for him or her. No video or camera photography during the procedure.  What should I wear: Wear comfortable clothes.  How long does the exam take: Most ultrasounds take 30 to 60 minutes.  What should I bring: Bring a list of your medicines, including vitamins, minerals and over-the-counter drugs. It is safest to leave personal items at home.  Do I need a :  No  is needed.  What do I need to tell my doctor: Tell your doctor about any allergies you may have.  What should I do  after the exam: No restrictions, you may resume normal activities.  What is this test: An ultrasound uses sound waves to make pictures of the body. Sound waves do not cause pain. The only discomfort may be the pressure of the wand against your skin or full bladder.  Who should I call with questions: If you have any questions, please call the Imaging Department where you will have your exam. Directions, parking instructions, and other information are available on our website, [email or web address].              Who to contact     If you have questions or need follow up information about today's clinic visit or your schedule please contact [facility] directly at [phone].  Normal or non-critical lab and imaging results will be communicated to you by MyChart, letter or phone within 4 business days after the clinic has received the results. If you do not hear from us within 7 days, please contact the clinic through [employer] or phone. If you have a critical or abnormal lab result, we will notify you by phone as soon as possible.  Submit refill requests through [employer] or call your pharmacy and they will forward the refill request to us. Please allow 3 business days for your refill to be completed.          Additional Information About Your Visit        [employer] Information     [employer] gives you secure access to your electronic health record. If you see a primary care provider, you can also send messages to your care team and make appointments. If you have questions, please call your primary care clinic.  If you do not have a primary care provider, please call [phone] and they will assist you.        Care EveryWhere ID     This is your Care EveryWhere ID. This could be used by other organizations to access your [facility] medical records  [id number]        Your Vitals Were     Last Period Breastfeeding? BMI (Body Mass Index)             11/15/2018 (Exact Date) Unknown 27.79 kg/m2          Blood  Pressure from Last 3 Encounters:   11/20/18 124/70   10/03/18 127/78   09/19/18 124/78    Weight from Last 3 Encounters:   11/20/18 167 lb (75.8 kg)   10/03/18 169 lb (76.7 kg)   09/19/18 168 lb 4.8 oz (76.3 kg)              We Performed the Following     HPV High Risk Types DNA Cervical     Pap imaged thin layer screen with HPV - recommended age 30 - 65 years (select HPV order below)     Wet prep          Today's Medication Changes          These changes are accurate as of 11/20/18  5:07 PM.  If you have any questions, ask your nurse or doctor.               Start taking these medicines.        Dose/Directions    fluconazole 150 MG tablet   Commonly known as:  DIFLUCAN   Used for:  Yeast infection of the vagina   Started by:  Anamaria Veronica APRN CNM        Dose:  150 mg   Take 1 tablet (150 mg) by mouth once for 1 dose   Quantity:  1 tablet   Refills:  0            Where to get your medicines      These medications were sent to [facility] [address] [place] [address] [id number] [place] [address]  [id number] [place] [address]     Phone:  [phone]     fluconazole 150 MG tablet                Primary Care Provider Office Phone # Fax #    [facility] [phone]       [address] [place] [address]        Equal Access to Services     JAYNE CANDELARIO AH: Hadii cullen ku hadasho Soomaali, waaxda luqadaha, qaybta kaalmada adeegyada, tere ovalles. So [facility] [phone].    ATENCIÓN: Si habla español, tiene a fleming disposición servicios gratuitos de asistencia lingüística. Sorayaame al [phone].    We comply with applicable federal civil rights laws and [place] laws. We do not discriminate on the basis of race, color, national origin, age, disability, sex, sexual orientation, or gender identity.            Thank you!     Thank you for choosing [facility]  for your care. Our goal is always to provide you with excellent care. Hearing back  from our patients is one way we can continue to improve our services. Please take a few minutes to complete the written survey that you may receive in the mail after your visit with us. Thank you!             Your Updated Medication List - Protect others around you: Learn how to safely use, store and throw away your medicines at www.disposemymeds.org.          This list is accurate as of 11/20/18  5:07 PM.  Always use your most recent med list.                   Brand Name Dispense Instructions for use Diagnosis    clobetasol 0.05 % cream    TEMOVATE          fluconazole 150 MG tablet    DIFLUCAN    1 tablet    Take 1 tablet (150 mg) by mouth once for 1 dose    Yeast infection of the vagina       levothyroxine 25 MCG tablet    SYNTHROID/LEVOTHROID    90 tablet    Take 1 tablet (25 mcg) by mouth daily    Congenital hypothyroidism with diffuse goiter       prenatal multivitamin plus iron 27-0.8 MG Tabs per tablet      Take 1 tablet by mouth daily

## 2018-11-26 ENCOUNTER — OFFICE VISIT (OUTPATIENT)
Dept: FAMILY MEDICINE | Facility: CLINIC | Age: 38
End: 2018-11-26
Payer: COMMERCIAL

## 2018-11-26 VITALS
HEIGHT: 65 IN | TEMPERATURE: 97.9 F | WEIGHT: 168.7 LBS | HEART RATE: 76 BPM | BODY MASS INDEX: 28.11 KG/M2 | SYSTOLIC BLOOD PRESSURE: 120 MMHG | DIASTOLIC BLOOD PRESSURE: 80 MMHG | OXYGEN SATURATION: 99 %

## 2018-11-26 DIAGNOSIS — Z00.00 ROUTINE GENERAL MEDICAL EXAMINATION AT A HEALTH CARE FACILITY: ICD-10-CM

## 2018-11-26 DIAGNOSIS — R10.2 VAGINAL PAIN: Primary | ICD-10-CM

## 2018-11-26 DIAGNOSIS — Z87.59 MISCARRIAGE WITHIN LAST 12 MONTHS: ICD-10-CM

## 2018-11-26 DIAGNOSIS — E03.9 ACQUIRED HYPOTHYROIDISM: ICD-10-CM

## 2018-11-26 DIAGNOSIS — Z23 NEED FOR PROPHYLACTIC VACCINATION AND INOCULATION AGAINST INFLUENZA: ICD-10-CM

## 2018-11-26 DIAGNOSIS — Z13.0 SCREENING FOR DISORDER OF BLOOD AND BLOOD-FORMING ORGANS: ICD-10-CM

## 2018-11-26 DIAGNOSIS — Z13.220 LIPID SCREENING: ICD-10-CM

## 2018-11-26 DIAGNOSIS — Z13.1 SCREENING FOR DIABETES MELLITUS: ICD-10-CM

## 2018-11-26 LAB
COPATH REPORT: NORMAL
ERYTHROCYTE [DISTWIDTH] IN BLOOD BY AUTOMATED COUNT: 11.8 % (ref 10–15)
HCT VFR BLD AUTO: 39.9 % (ref 35–47)
HGB BLD-MCNC: 13.3 G/DL (ref 11.7–15.7)
MCH RBC QN AUTO: 30.1 PG (ref 26.5–33)
MCHC RBC AUTO-ENTMCNC: 33.3 G/DL (ref 31.5–36.5)
MCV RBC AUTO: 90 FL (ref 78–100)
PAP: NORMAL
PLATELET # BLD AUTO: 288 10E9/L (ref 150–450)
RBC # BLD AUTO: 4.42 10E12/L (ref 3.8–5.2)
WBC # BLD AUTO: 9.9 10E9/L (ref 4–11)

## 2018-11-26 PROCEDURE — 36415 COLL VENOUS BLD VENIPUNCTURE: CPT | Performed by: PHYSICIAN ASSISTANT

## 2018-11-26 PROCEDURE — 80053 COMPREHEN METABOLIC PANEL: CPT | Performed by: PHYSICIAN ASSISTANT

## 2018-11-26 PROCEDURE — 90686 IIV4 VACC NO PRSV 0.5 ML IM: CPT | Performed by: PHYSICIAN ASSISTANT

## 2018-11-26 PROCEDURE — 80061 LIPID PANEL: CPT | Performed by: PHYSICIAN ASSISTANT

## 2018-11-26 PROCEDURE — 85027 COMPLETE CBC AUTOMATED: CPT | Performed by: PHYSICIAN ASSISTANT

## 2018-11-26 PROCEDURE — 99385 PREV VISIT NEW AGE 18-39: CPT | Mod: 25 | Performed by: PHYSICIAN ASSISTANT

## 2018-11-26 PROCEDURE — 90471 IMMUNIZATION ADMIN: CPT | Performed by: PHYSICIAN ASSISTANT

## 2018-11-26 PROCEDURE — 84443 ASSAY THYROID STIM HORMONE: CPT | Performed by: PHYSICIAN ASSISTANT

## 2018-11-26 ASSESSMENT — ENCOUNTER SYMPTOMS
HEADACHES: 0
WEAKNESS: 0
PALPITATIONS: 0
NERVOUS/ANXIOUS: 1
SORE THROAT: 0
HEARTBURN: 0
MYALGIAS: 0
DIZZINESS: 0
EYE PAIN: 0
DIARRHEA: 0
COUGH: 0
FREQUENCY: 1
HEMATURIA: 0
FEVER: 0
JOINT SWELLING: 0
SHORTNESS OF BREATH: 0
NAUSEA: 0
HEMATOCHEZIA: 0
ARTHRALGIAS: 0
DYSURIA: 0
PARESTHESIAS: 1
ABDOMINAL PAIN: 1
CONSTIPATION: 0
BREAST MASS: 0
CHILLS: 0

## 2018-11-26 NOTE — PATIENT INSTRUCTIONS
(R10.2) Vaginal pain  (primary encounter diagnosis)  Comment:   Plan: unclear etiology.  Had a miscarriage October 9th.  Has had vaginal pain x 4 weeks.  Very uncomfortable.  Was given diflucan last week although wet prep was negative and it didn't help.  Doesn't notice vaginal discharge.   Pain is surrounding vaginal vault    (Z23) Need for prophylactic vaccination and inoculation against influenza  Comment:   Plan:     (Z00.00) Routine general medical examination at a health care facility  Comment:   Plan:     (Z87.59) Miscarriage within last 12 months  Comment:   Plan: OB/GYN REFERRAL            (E03.9) Acquired hypothyroidism  Comment:   Plan: TSH with free T4 reflex            (Z13.1) Screening for diabetes mellitus  Comment:   Plan: Comprehensive metabolic panel            (Z13.220) Lipid screening  Comment:   Plan: Lipid panel reflex to direct LDL Fasting            (Z13.0) Screening for disorder of blood and blood-forming organs  Comment:   Plan: CBC with platelets                          Preventive Health Recommendations  Female Ages 26 - 39  Yearly exam:   See your health care provider every year in order to    Review health changes.     Discuss preventive care.      Review your medicines if you your doctor has prescribed any.    Until age 30: Get a Pap test every three years (more often if you have had an abnormal result).    After age 30: Talk to your doctor about whether you should have a Pap test every 3 years or have a Pap test with HPV screening every 5 years.   You do not need a Pap test if your uterus was removed (hysterectomy) and you have not had cancer.  You should be tested each year for STDs (sexually transmitted diseases), if you're at risk.   Talk to your provider about how often to have your cholesterol checked.  If you are at risk for diabetes, you should have a diabetes test (fasting glucose).  Shots: Get a flu shot each year. Get a tetanus shot every 10 years.   Nutrition:     Eat at  least 5 servings of fruits and vegetables each day.    Eat whole-grain bread, whole-wheat pasta and brown rice instead of white grains and rice.    Get adequate Calcium and Vitamin D.     Lifestyle    Exercise at least 150 minutes a week (30 minutes a day, 5 days of the week). This will help you control your weight and prevent disease.    Limit alcohol to one drink per day.    No smoking.     Wear sunscreen to prevent skin cancer.    See your dentist every six months for an exam and cleaning.

## 2018-11-26 NOTE — PROGRESS NOTES
SUBJECTIVE:   CC: Laurie Diaz is an 37 year old woman who presents for preventive health visit.     Physical   Annual:     Getting at least 3 servings of Calcium per day:  Yes    Bi-annual eye exam:  NO    Dental care twice a year:  Yes    Sleep apnea or symptoms of sleep apnea:  None    Diet:  Regular (no restrictions)    Frequency of exercise:  1 day/week    Duration of exercise:  15-30 minutes    Taking medications regularly:  Yes    Medication side effects:  Not applicable    Additional concerns today:  Yes              Today's PHQ-2 Score:   PHQ-2 ( 1999 Pfizer) 11/26/2018   Q1: Little interest or pleasure in doing things 0   Q2: Feeling down, depressed or hopeless 0   PHQ-2 Score 0   Q1: Little interest or pleasure in doing things Not at all   Q2: Feeling down, depressed or hopeless Not at all   PHQ-2 Score 0       Abuse: Current or Past(Physical, Sexual or Emotional)- No  Do you feel safe in your environment? Yes    Social History   Substance Use Topics     Smoking status: Never Smoker     Smokeless tobacco: Never Used     Alcohol use 0.0 oz/week      Comment: infrequent     Alcohol Use 11/26/2018   If you drink alcohol do you typically have greater than 3 drinks per day OR greater than 7 drinks per week? No   No flowsheet data found.    Reviewed orders with patient.  Reviewed health maintenance and updated orders accordingly - Yes  BP Readings from Last 3 Encounters:   11/26/18 120/80   11/20/18 124/70   10/03/18 127/78    Wt Readings from Last 3 Encounters:   11/26/18 168 lb 11.2 oz (76.5 kg)   11/20/18 167 lb (75.8 kg)   10/03/18 169 lb (76.7 kg)                  Current Outpatient Prescriptions   Medication Sig Dispense Refill     clobetasol (TEMOVATE) 0.05 % cream        levothyroxine (SYNTHROID, LEVOTHROID) 25 MCG tablet Take 1 tablet (25 mcg) by mouth daily 90 tablet 3     Prenatal Vit-Fe Fumarate-FA (PRENATAL MULTIVITAMIN  PLUS IRON) 27-0.8 MG TABS Take 1 tablet by mouth daily       Recent Labs    Lab Test  10/08/18   1537  09/19/18   1500   TSH  3.57  2.60        Mammogram not appropriate for this patient based on age.    Pertinent mammograms are reviewed under the imaging tab.  History of abnormal Pap smear: NO - age 30- 65 PAP every 3 years recommended  PAP / HPV 7/20/2015   PAP nil     Reviewed and updated as needed this visit by clinical staff         Reviewed and updated as needed this visit by Provider            Review of Systems  CONSTITUTIONAL: NEGATIVE for fever, chills, change in weight  INTEGUMENTARU/SKIN: NEGATIVE for worrisome rashes, moles or lesions  EYES: NEGATIVE for vision changes or irritation  ENT: NEGATIVE for ear, mouth and throat problems  RESP: NEGATIVE for significant cough or SOB  BREAST: NEGATIVE for masses, tenderness or discharge  CV: NEGATIVE for chest pain, palpitations or peripheral edema  GI: NEGATIVE for nausea, abdominal pain, heartburn, or change in bowel habits  : NEGATIVE for unusual urinary or vaginal symptoms. Periods are regular.  MUSCULOSKELETAL: NEGATIVE for significant arthralgias or myalgia  NEURO: NEGATIVE for weakness, dizziness or paresthesias  PSYCHIATRIC: NEGATIVE for changes in mood or affect     OBJECTIVE:   LMP 11/15/2018 (Exact Date)  Physical Exam  GENERAL: healthy, alert and no distress  EYES: Eyes grossly normal to inspection, PERRL and conjunctivae and sclerae normal  HENT: ear canals and TM's normal, nose and mouth without ulcers or lesions  NECK: no adenopathy, no asymmetry, masses, or scars and thyroid normal to palpation  RESP: lungs clear to auscultation - no rales, rhonchi or wheezes  BREAST: normal without masses, tenderness or nipple discharge and no palpable axillary masses or adenopathy  CV: regular rate and rhythm, normal S1 S2, no S3 or S4, no murmur, click or rub, no peripheral edema and peripheral pulses strong  ABDOMEN: soft, nontender, no hepatosplenomegaly, no masses and bowel sounds normal   (female): normal female external  "genitalia, normal urethral meatus, vaginal mucosa pink, moist, well rugated, and normal cervix/adnexa/uterus without masses or discharge  MS: no gross musculoskeletal defects noted, no edema  SKIN: no suspicious lesions or rashes  NEURO: Normal strength and tone, mentation intact and speech normal  PSYCH: mentation appears normal, affect normal/bright  LYMPH: no cervical, supraclavicular, axillary, or inguinal adenopathy    Diagnostic Test Results:  none     ASSESSMENT/PLAN:   1. Routine general medical examination at a health care facility      2. Need for prophylactic vaccination and inoculation against influenza    - FLU VACCINE, SPLIT VIRUS, IM (QUADRIVALENT) [85500]- >3 YRS  - Vaccine Administration, Initial [57991]    3. Vaginal pain  Unclear etiology.  Negative wet prep and denies discharge.  Was given a diflucan and this did not help.  miscarried in September and this pain started in October and has had for 4 weeks.   - OB/GYN REFERRAL    4. Miscarriage within last 12 months      5. Acquired hypothyroidism    - TSH with free T4 reflex    6. Screening for diabetes mellitus  Ate at 10:00 this am.   - Comprehensive metabolic panel    7. Lipid screening    - Lipid panel reflex to direct LDL Fasting    8. Screening for disorder of blood and blood-forming organs    - CBC with platelets    COUNSELING:  Reviewed preventive health counseling, as reflected in patient instructions       Regular exercise       Healthy diet/nutrition       Vision screening       Hearing screening    BP Readings from Last 1 Encounters:   11/20/18 124/70     Estimated body mass index is 27.79 kg/(m^2) as calculated from the following:    Height as of 9/19/18: 5' 5\" (1.651 m).    Weight as of 11/20/18: 167 lb (75.8 kg).           reports that she has never smoked. She has never used smokeless tobacco.      Counseling Resources:  ATP IV Guidelines  Pooled Cohorts Equation Calculator  Breast Cancer Risk Calculator  FRAX Risk Assessment  ICSI " Preventive Guidelines  Dietary Guidelines for Americans, 2010  USDA's MyPlate  ASA Prophylaxis  Lung CA Screening    Ramona Ann Aaseby-Aguilera, PA-C  [facility]

## 2018-11-26 NOTE — MR AVS SNAPSHOT
After Visit Summary   11/26/2018    Laurie Diaz    MRN: [id number]           Patient Information     Date Of Birth          1980        Visit Information        Provider Department      11/26/2018 3:30 PM Aaseby-Aguilera, Ramona Ann, PA-C [facility]        Today's Diagnoses     Vaginal pain    -  1    Routine general medical examination at a health care facility        Need for prophylactic vaccination and inoculation against influenza        Miscarriage within last 12 months        Acquired hypothyroidism        Screening for diabetes mellitus        Lipid screening        Screening for disorder of blood and blood-forming organs          Care Instructions    (R10.2) Vaginal pain  (primary encounter diagnosis)  Comment:   Plan: unclear etiology.  Had a miscarriage October 9th.  Has had vaginal pain x 4 weeks.  Very uncomfortable.  Was given diflucan last week although wet prep was negative and it didn't help.  Doesn't notice vaginal discharge.   Pain is surrounding vaginal vault    (Z23) Need for prophylactic vaccination and inoculation against influenza  Comment:   Plan:     (Z00.00) Routine general medical examination at a health care facility  Comment:   Plan:     (Z87.59) Miscarriage within last 12 months  Comment:   Plan: OB/GYN REFERRAL            (E03.9) Acquired hypothyroidism  Comment:   Plan: TSH with free T4 reflex            (Z13.1) Screening for diabetes mellitus  Comment:   Plan: Comprehensive metabolic panel            (Z13.220) Lipid screening  Comment:   Plan: Lipid panel reflex to direct LDL Fasting            (Z13.0) Screening for disorder of blood and blood-forming organs  Comment:   Plan: CBC with platelets                          Preventive Health Recommendations  Female Ages 26 - 39  Yearly exam:   See your health care provider every year in order to    Review health changes.     Discuss preventive care.      Review your medicines if you your doctor has  prescribed any.    Until age 30: Get a Pap test every three years (more often if you have had an abnormal result).    After age 30: Talk to your doctor about whether you should have a Pap test every 3 years or have a Pap test with HPV screening every 5 years.   You do not need a Pap test if your uterus was removed (hysterectomy) and you have not had cancer.  You should be tested each year for STDs (sexually transmitted diseases), if you're at risk.   Talk to your provider about how often to have your cholesterol checked.  If you are at risk for diabetes, you should have a diabetes test (fasting glucose).  Shots: Get a flu shot each year. Get a tetanus shot every 10 years.   Nutrition:     Eat at least 5 servings of fruits and vegetables each day.    Eat whole-grain bread, whole-wheat pasta and brown rice instead of white grains and rice.    Get adequate Calcium and Vitamin D.     Lifestyle    Exercise at least 150 minutes a week (30 minutes a day, 5 days of the week). This will help you control your weight and prevent disease.    Limit alcohol to one drink per day.    No smoking.     Wear sunscreen to prevent skin cancer.    See your dentist every six months for an exam and cleaning.            Follow-ups after your visit        Additional Services     OB/GYN REFERRAL       Your provider has referred you to:  [facility]: [facility] [phone]   [email or web address]    Please be aware that coverage of these services is subject to the terms and limitations of your health insurance plan.  Call member services at your health plan with any benefit or coverage questions.      Please bring the following with you to your appointment:    (1) Any X-Rays, CTs or MRIs which have been performed.  Contact the facility where they were done to arrange for  prior to your scheduled appointment.   (2) List of current medications   (3) This referral request   (4) Any documents/labs  given to you for this referral                  Follow-up notes from your care team     Return in about 1 year (around 11/26/2019) for Physical Exam.      Your next 10 appointments already scheduled     Dec 03, 2018  4:00 PM CST   US TRANSVAGINAL NON OB with RDUS1   [facility] ([facility])    [address] [place] [address]  [address]  [facility] [id number] [phone]           How do I prepare for my exam? (Food and drink instructions) Drink four 8-ounce glasses of fluid. Finish drinking an hour before your exam, so you have a full bladder. If you need to empty your bladder before your exam, try to release only a little urine. Then, drink another glass of fluid.  How do I prepare for my exam? (Other instructions) You may have up to two family members in the exam room. If you bring a small child, an adult must be there to care for him or her. No video or camera photography during the procedure.  What should I wear: Wear comfortable clothes.  How long does the exam take: Most ultrasounds take 30 to 60 minutes.  What should I bring: Bring a list of your medicines, including vitamins, minerals and over-the-counter drugs. It is safest to leave personal items at home.  Do I need a :  No  is needed.  What do I need to tell my doctor: Tell your doctor about any allergies you may have.  What should I do after the exam: No restrictions, you may resume normal activities.  What is this test: An ultrasound uses sound waves to make pictures of the body. Sound waves do not cause pain. The only discomfort may be the pressure of the wand against your skin or full bladder.  Who should I call with questions: If you have any questions, please call the Imaging Department where you will have your exam. Directions, parking instructions, and other information are available on our website, [email or web address].              Who to contact     If you have questions or need follow up information about  "today's clinic visit or your schedule please contact [facility] directly at [phone].  Normal or non-critical lab and imaging results will be communicated to you by MyChart, letter or phone within 4 business days after the clinic has received the results. If you do not hear from us within 7 days, please contact the clinic through Ohanahart or phone. If you have a critical or abnormal lab result, we will notify you by phone as soon as possible.  Submit refill requests through Apex Guard or call your pharmacy and they will forward the refill request to us. Please allow 3 business days for your refill to be completed.          Additional Information About Your Visit        OhanaharProt-On Information     Apex Guard gives you secure access to your electronic health record. If you see a primary care provider, you can also send messages to your care team and make appointments. If you have questions, please call your primary care clinic.  If you do not have a primary care provider, please call [phone] and they will assist you.        Care EveryWhere ID     This is your Care EveryWhere ID. This could be used by other organizations to access your [place] medical records  [id number]        Your Vitals Were     Pulse Temperature Height Last Period Pulse Oximetry BMI (Body Mass Index)    76 97.9  F (36.6  C) (Oral) 5' 4.75\" (1.645 m) 11/15/2018 (Exact Date) 99% 28.29 kg/m2       Blood Pressure from Last 3 Encounters:   11/26/18 120/80   11/20/18 124/70   10/03/18 127/78    Weight from Last 3 Encounters:   11/26/18 168 lb 11.2 oz (76.5 kg)   11/20/18 167 lb (75.8 kg)   10/03/18 169 lb (76.7 kg)              We Performed the Following     CBC with platelets     Comprehensive metabolic panel     Lipid panel reflex to direct LDL Fasting     OB/GYN REFERRAL     TSH with free T4 reflex        Primary Care Provider Office Phone # Fax #    [facility] [phone]       [address] [place] [address]  [facility] [address]        Equal Access to Services     KAYCEE CANDELARIO : Hadii cullen Blankenship, wavalentín taylor, tere stafford. So [facility] [phone].    ATENCIÓN: Si habla español, tiene a fleming disposición servicios gratuitos de asistencia lingüística. Sorayaame al [phone].    We comply with applicable federal civil rights laws and [place] laws. We do not discriminate on the basis of race, color, national origin, age, disability, sex, sexual orientation, or gender identity.            Thank you!     Thank you for choosing [facility]  for your care. Our goal is always to provide you with excellent care. Hearing back from our patients is one way we can continue to improve our services. Please take a few minutes to complete the written survey that you may receive in the mail after your visit with us. Thank you!             Your Updated Medication List - Protect others around you: Learn how to safely use, store and throw away your medicines at www.disposemymeds.org.          This list is accurate as of 11/26/18  3:51 PM.  Always use your most recent med list.                   Brand Name Dispense Instructions for use Diagnosis    clobetasol 0.05 % cream    TEMOVATE          levothyroxine 25 MCG tablet    SYNTHROID/LEVOTHROID    90 tablet    Take 1 tablet (25 mcg) by mouth daily    Congenital hypothyroidism with diffuse goiter       prenatal multivitamin plus iron 27-0.8 MG Tabs per tablet      Take 1 tablet by mouth daily

## 2018-11-26 NOTE — PROGRESS NOTES

## 2018-11-27 ENCOUNTER — OFFICE VISIT (OUTPATIENT)
Dept: OBGYN | Facility: CLINIC | Age: 38
End: 2018-11-27
Payer: COMMERCIAL

## 2018-11-27 VITALS
WEIGHT: 168 LBS | DIASTOLIC BLOOD PRESSURE: 78 MMHG | BODY MASS INDEX: 27.99 KG/M2 | HEIGHT: 65 IN | SYSTOLIC BLOOD PRESSURE: 128 MMHG

## 2018-11-27 DIAGNOSIS — R10.2 PELVIC PAIN IN FEMALE: Primary | ICD-10-CM

## 2018-11-27 DIAGNOSIS — N71.9 ENDOMETRITIS: ICD-10-CM

## 2018-11-27 LAB
ALBUMIN SERPL-MCNC: 4.2 G/DL (ref 3.4–5)
ALBUMIN UR-MCNC: NEGATIVE MG/DL
ALP SERPL-CCNC: 57 U/L (ref 40–150)
ALT SERPL W P-5'-P-CCNC: 18 U/L (ref 0–50)
ANION GAP SERPL CALCULATED.3IONS-SCNC: 7 MMOL/L (ref 3–14)
APPEARANCE UR: CLEAR
AST SERPL W P-5'-P-CCNC: 17 U/L (ref 0–45)
BILIRUB SERPL-MCNC: 0.9 MG/DL (ref 0.2–1.3)
BILIRUB UR QL STRIP: NEGATIVE
BUN SERPL-MCNC: 8 MG/DL (ref 7–30)
CALCIUM SERPL-MCNC: 8.9 MG/DL (ref 8.5–10.1)
CHLORIDE SERPL-SCNC: 107 MMOL/L (ref 94–109)
CHOLEST SERPL-MCNC: 152 MG/DL
CO2 SERPL-SCNC: 25 MMOL/L (ref 20–32)
COLOR UR AUTO: YELLOW
CREAT SERPL-MCNC: 0.52 MG/DL (ref 0.52–1.04)
ERYTHROCYTE [DISTWIDTH] IN BLOOD BY AUTOMATED COUNT: 11.8 % (ref 10–15)
FINAL DIAGNOSIS: NORMAL
GFR SERPL CREATININE-BSD FRML MDRD: >90 ML/MIN/1.7M2
GLUCOSE SERPL-MCNC: 88 MG/DL (ref 70–99)
GLUCOSE UR STRIP-MCNC: NEGATIVE MG/DL
HCT VFR BLD AUTO: 40.4 % (ref 35–47)
HDLC SERPL-MCNC: 65 MG/DL
HGB BLD-MCNC: 13.2 G/DL (ref 11.7–15.7)
HGB UR QL STRIP: ABNORMAL
HPV HR 12 DNA CVX QL NAA+PROBE: NEGATIVE
HPV16 DNA SPEC QL NAA+PROBE: NEGATIVE
HPV18 DNA SPEC QL NAA+PROBE: NEGATIVE
KETONES UR STRIP-MCNC: NEGATIVE MG/DL
LDLC SERPL CALC-MCNC: 72 MG/DL
LEUKOCYTE ESTERASE UR QL STRIP: NEGATIVE
MCH RBC QN AUTO: 30 PG (ref 26.5–33)
MCHC RBC AUTO-ENTMCNC: 32.7 G/DL (ref 31.5–36.5)
MCV RBC AUTO: 92 FL (ref 78–100)
NITRATE UR QL: NEGATIVE
NONHDLC SERPL-MCNC: 87 MG/DL
PH UR STRIP: 5.5 PH (ref 5–7)
PLATELET # BLD AUTO: 292 10E9/L (ref 150–450)
POTASSIUM SERPL-SCNC: 3.9 MMOL/L (ref 3.4–5.3)
PROT SERPL-MCNC: 7.3 G/DL (ref 6.8–8.8)
RBC # BLD AUTO: 4.4 10E12/L (ref 3.8–5.2)
RBC #/AREA URNS AUTO: ABNORMAL /HPF
SODIUM SERPL-SCNC: 139 MMOL/L (ref 133–144)
SOURCE: ABNORMAL
SP GR UR STRIP: <=1.005 (ref 1–1.03)
SPECIMEN DESCRIPTION: NORMAL
SPECIMEN SOURCE CVX/VAG CYTO: NORMAL
SPECIMEN SOURCE: NORMAL
TRIGL SERPL-MCNC: 73 MG/DL
TSH SERPL DL<=0.005 MIU/L-ACNC: 2.68 MU/L (ref 0.4–4)
UROBILINOGEN UR STRIP-ACNC: 0.2 EU/DL (ref 0.2–1)
WBC # BLD AUTO: 10 10E9/L (ref 4–11)
WBC #/AREA URNS AUTO: ABNORMAL /HPF
WET PREP SPEC: NORMAL

## 2018-11-27 PROCEDURE — 88305 TISSUE EXAM BY PATHOLOGIST: CPT | Performed by: FAMILY MEDICINE

## 2018-11-27 PROCEDURE — 87210 SMEAR WET MOUNT SALINE/INK: CPT | Performed by: FAMILY MEDICINE

## 2018-11-27 PROCEDURE — 36415 COLL VENOUS BLD VENIPUNCTURE: CPT | Performed by: FAMILY MEDICINE

## 2018-11-27 PROCEDURE — 85027 COMPLETE CBC AUTOMATED: CPT | Performed by: FAMILY MEDICINE

## 2018-11-27 PROCEDURE — 81001 URINALYSIS AUTO W/SCOPE: CPT | Performed by: FAMILY MEDICINE

## 2018-11-27 PROCEDURE — 58100 BIOPSY OF UTERUS LINING: CPT | Performed by: FAMILY MEDICINE

## 2018-11-27 PROCEDURE — 87591 N.GONORRHOEAE DNA AMP PROB: CPT | Performed by: FAMILY MEDICINE

## 2018-11-27 PROCEDURE — 87491 CHLMYD TRACH DNA AMP PROBE: CPT | Performed by: FAMILY MEDICINE

## 2018-11-27 PROCEDURE — 99214 OFFICE O/P EST MOD 30 MIN: CPT | Mod: 25 | Performed by: FAMILY MEDICINE

## 2018-11-27 NOTE — PROGRESS NOTES
SUBJECTIVE:  Laurie Diaz is an 37 year old  woman who presents for   gynecology consult for vulvar pain.  Patient's last menstrual period was 11/15/2018 (exact date). Periods are regular q 28-30 days, lasting 5 days. Dysmenorrhea:none. Cyclic symptoms include none. No intermenstrual bleeding, spotting, or discharge.    Current contraception: none - currently trying to conceive  History of abnormal Pap smear: No  Family history of uterine or ovarian cancer: No  History of abnormal mammogram: No  Family history of breast cancer: No    Concerns today:     - Pt reports pain vulvar pain along the labia majora and perianal region x 4 weeks but worsening x 1 week ago [2 days after her menses] -- notes accompanying right thigh numbness & pelvic pressure. She does report a SAB x 6 weeks ago, unsure if related in any way. Pt is still hoping/trying to conceive again, so would like to find out what is currently going on & ensure nothing is wrong.    > Has not been sexually active since SAB      Past Medical History:   Diagnosis Date     Anxiety      Hypothyroidism      Paresthesia           Family History   Problem Relation Age of Onset     Hypothyroidism Mother      Thyroid Disease Mother      Hypertension Father      HEART DISEASE Father      heart attack     Diabetes Father      Unknown/Adopted Father        Past Surgical History:   Procedure Laterality Date     ORTHOPEDIC SURGERY  2015    Wirst surgery for torn ligaments       Current Outpatient Prescriptions   Medication     amoxicillin-clavulanate (AUGMENTIN) 875-125 MG tablet     clobetasol (TEMOVATE) 0.05 % cream     levothyroxine (SYNTHROID, LEVOTHROID) 25 MCG tablet     Prenatal Vit-Fe Fumarate-FA (PRENATAL MULTIVITAMIN  PLUS IRON) 27-0.8 MG TABS     No current facility-administered medications for this visit.      No Known Allergies    Social History   Substance Use Topics     Smoking status: Never Smoker     Smokeless tobacco: Never Used     Alcohol use  "0.0 oz/week      Comment: infrequent       Review Of Systems  Ears/Nose/Throat: negative  Respiratory: No shortness of breath, dyspnea on exertion, cough, or hemoptysis  Cardiovascular: negative  Gastrointestinal: negative  Genitourinary: vulvar pain; otherwise negative  Constitutional, HEENT, cardiovascular, pulmonary, GI, , musculoskeletal, neuro, skin, endocrine and psych systems are negative, except as otherwise noted.      This document serves as a record of the services and decisions personally performed and made by Rose Stauffer DO. It was created on her behalf by Jennifer Oneill, a trained medical scribe. The creation of this document is based the provider's statements to the medical scribe.  Scribe Jennifer Oneill 3:10 PM, November 27, 2018    OBJECTIVE:  /78  Ht 1.645 m (5' 4.75\")  Wt 76.2 kg (168 lb)  LMP 11/15/2018 (Exact Date)  BMI 28.17 kg/m2  General appearance: healthy, alert and no distress  Skin: Skin color, texture, turgor normal. No rashes or lesions.  Lungs: negative, Percussion normal. Good diaphragmatic excursion. Lungs clear  Heart: negative, PMI normal. No lifts, heaves, or thrills. RRR. No murmurs, clicks gallops or rub  Pelvic: Pelvic examination without pap/ Gonorrhea and Chlamydia   including  External genitalia normal   and vagina normal rugatted not atrophic  Examination of urethra normal no masses, tenderness, scarring  bladder, no masses or tenderness  Cervix no lesions or discharge  Bimanual exam with   Uterus 7 weeks size, mid position, mobile, mild to moderate tenderness, no descent   Adnexa/parametria normal        LABS:  UA, Wet prep, GC, Endometrial biopsy        Procedure:  Endometrial biopsy  - After obtaining informed consent pt prepped in the usual sterile fashion and endometrial biopsy preformed w a pipelle type sampler without difficulty or complication w a thorough sampling and acceptable specimen.  The uterus sounded to 7 cm.  the pt tolerated the procedure well " and was D/C'd in good condition. Signs and Sx's of complications disc, pt to call.  pt will call in 1 week to rev path report and develope an approp plan.          ASSESSMENT:  Laurie Diaz is an 37 year old  woman who presents for   gynecology consult for vulvar/vaginal pain.    PLAN:  Dx: Endometritis; Pelvic pain  1)  UA, Wet prep, GC & CBC pending; Endo bx - pathology pending & results will be reported within 1 week; US ordered; If everything is negative, will order CT to rule out a possible blood clot   - Antibiotic rx given to treat any infection, advised to take probiotics  2)  Return to clinic in 1 week for follow-up.      Rx:  - Augmentin 875-125 mg 1 tab po BID for 14 days        The information in this document, created by the medical scribe for me, accurately reflects the services I personally performed and the decisions made by me. I have reviewed and approved this document for accuracy prior to leaving the patient care area.  3:10 PM, 18    Dr. Rose Stauffer, DO    Obstetrics and Gynecology  [facility]

## 2018-11-27 NOTE — MR AVS SNAPSHOT
After Visit Summary   11/27/2018    Laurie Diaz    MRN: [id number]           Patient Information     Date Of Birth          1980        Visit Information        Provider Department      11/27/2018 3:00 PM Rose Stauffer, DO [facility]        Today's Diagnoses     Pelvic pain in female    -  1    Endometritis          Care Instructions    Antibiotic for 7-14 days   Return to clinic in 1 week    ultrasound [phone] (office) or Radiology [phone])     Dr. Rose Stauffer, DO    Obstetrics and Gynecology  [facility] [place]                 Follow-ups after your visit        Your next 10 appointments already scheduled     Dec 03, 2018  4:00 PM CST   US TRANSVAGINAL NON OB with RDUS1   [facility] ([facility])    [address] [place] [address]  [address] [place] [address] [phone]           How do I prepare for my exam? (Food and drink instructions) Drink four 8-ounce glasses of fluid. Finish drinking an hour before your exam, so you have a full bladder. If you need to empty your bladder before your exam, try to release only a little urine. Then, drink another glass of fluid.  How do I prepare for my exam? (Other instructions) You may have up to two family members in the exam room. If you bring a small child, an adult must be there to care for him or her. No video or camera photography during the procedure.  What should I wear: Wear comfortable clothes.  How long does the exam take: Most ultrasounds take 30 to 60 minutes.  What should I bring: Bring a list of your medicines, including vitamins, minerals and over-the-counter drugs. It is safest to leave personal items at home.  Do I need a :  No  is needed.  What do I need to tell my doctor: Tell your doctor about any allergies you may have.  What should I do after the exam: No restrictions, you may resume normal activities.  What is this test: An  ultrasound uses sound waves to make pictures of the body. Sound waves do not cause pain. The only discomfort may be the pressure of the wand against your skin or full bladder.  Who should I call with questions: If you have any questions, please call the Imaging Department where you will have your exam. Directions, parking instructions, and other information are available on our website, [email or web address].              Future tests that were ordered for you today     Open Future Orders        Priority Expected Expires Ordered    US Pelvic Complete with Transvaginal Routine 11/27/2018 5/26/2019 11/27/2018            Who to contact     If you have questions or need follow up information about today's clinic visit or your schedule please contact [facility] directly at [phone].  Normal or non-critical lab and imaging results will be communicated to you by FSIhart, letter or phone within 4 business days after the clinic has received the results. If you do not hear from us within 7 days, please contact the clinic through FSIhart or phone. If you have a critical or abnormal lab result, we will notify you by phone as soon as possible.  Submit refill requests through 10Six or call your pharmacy and they will forward the refill request to us. Please allow 3 business days for your refill to be completed.          Additional Information About Your Visit        FSIharSpire Sensibo Information     10Six gives you secure access to your electronic health record. If you see a primary care provider, you can also send messages to your care team and make appointments. If you have questions, please call your primary care clinic.  If you do not have a primary care provider, please call [phone] and they will assist you.        Care EveryWhere ID     This is your Care EveryWhere ID. This could be used by other organizations to access your [place] medical records  [id number]        Your Vitals Were     Height Last  "Period BMI (Body Mass Index)             5' 4.75\" (1.645 m) 11/15/2018 (Exact Date) 28.17 kg/m2          Blood Pressure from Last 3 Encounters:   11/27/18 128/78   11/26/18 120/80   11/20/18 124/70    Weight from Last 3 Encounters:   11/27/18 168 lb (76.2 kg)   11/26/18 168 lb 11.2 oz (76.5 kg)   11/20/18 167 lb (75.8 kg)              We Performed the Following     CBC with platelets     ENDOMETRIAL BIOPSY W/O CERVICAL DILATION     UA with Microscopic reflex to Culture          Today's Medication Changes          These changes are accurate as of 11/27/18  3:31 PM.  If you have any questions, ask your nurse or doctor.               Start taking these medicines.        Dose/Directions    amoxicillin-clavulanate 875-125 MG tablet   Commonly known as:  AUGMENTIN   Used for:  Pelvic pain in female, Endometritis   Started by:  Rose Stauffer DO        Dose:  1 tablet   Take 1 tablet by mouth 2 times daily for 14 days   Quantity:  28 tablet   Refills:  0            Where to get your medicines      These medications were sent to [facility] [address] [place] [id number] [facility]  [address] [place] [address]     Phone:  [phone]     amoxicillin-clavulanate 875-125 MG tablet                Primary Care Provider Office Phone # Fax #    [facility] [phone]       [address] [address]        Equal Access to Services     JAYNE CANDELARIO AH: Hadii cullen ku hadasho Soomaali, waaxda luqadaha, qaybta kaalmada adeegyada, waxay idipatti ovalles. So [facility] [phone].    ATENCIÓN: Si habla español, tiene a fleming disposición servicios gratuitos de asistencia lingüística. Llame al [phone].    We comply with applicable federal civil rights laws and [place] laws. We do not discriminate on the basis of race, color, national origin, age, disability, sex, sexual orientation, or gender identity.            Thank you!     Thank you for choosing " [facility]  for your care. Our goal is always to provide you with excellent care. Hearing back from our patients is one way we can continue to improve our services. Please take a few minutes to complete the written survey that you may receive in the mail after your visit with us. Thank you!             Your Updated Medication List - Protect others around you: Learn how to safely use, store and throw away your medicines at www.disposemymeds.org.          This list is accurate as of 11/27/18  3:31 PM.  Always use your most recent med list.                   Brand Name Dispense Instructions for use Diagnosis    amoxicillin-clavulanate 875-125 MG tablet    AUGMENTIN    28 tablet    Take 1 tablet by mouth 2 times daily for 14 days    Pelvic pain in female, Endometritis       clobetasol 0.05 % external cream    TEMOVATE          levothyroxine 25 MCG tablet    SYNTHROID/LEVOTHROID    90 tablet    Take 1 tablet (25 mcg) by mouth daily    Congenital hypothyroidism with diffuse goiter       pediatric multivitamin w/iron 27-0.8 MG tablet      Take 1 tablet by mouth daily

## 2018-11-27 NOTE — PATIENT INSTRUCTIONS
Antibiotic for 7-14 days   Return to clinic in 1 week    ultrasound [phone] (office) or Radiology [phone])     Dr. Rose Stauffer,     Obstetrics and Gynecology  [facility] [place]

## 2018-11-27 NOTE — NURSING NOTE
"Chief Complaint   Patient presents with     Vaginal Problem     pain in vulvar area--numbness in right thigh--going on for 4 weeks       Initial /78  Ht 5' 4.75\" (1.645 m)  Wt 168 lb (76.2 kg)  LMP 11/15/2018 (Exact Date)  BMI 28.17 kg/m2 Estimated body mass index is 28.17 kg/(m^2) as calculated from the following:    Height as of this encounter: 5' 4.75\" (1.645 m).    Weight as of this encounter: 168 lb (76.2 kg).  BP completed using cuff size: regular    Questioned patient about current smoking habits.  Pt. has never smoked.          The following HM Due: NONE             "

## 2018-11-29 LAB
C TRACH DNA SPEC QL NAA+PROBE: NEGATIVE
N GONORRHOEA DNA SPEC QL NAA+PROBE: NEGATIVE
SPECIMEN SOURCE: NORMAL
SPECIMEN SOURCE: NORMAL

## 2018-11-30 LAB — COPATH REPORT: NORMAL

## 2018-12-03 ENCOUNTER — HOSPITAL ENCOUNTER (OUTPATIENT)
Dept: ULTRASOUND IMAGING | Facility: CLINIC | Age: 38
Discharge: HOME | End: 2018-12-03
Attending: FAMILY MEDICINE | Admitting: FAMILY MEDICINE
Payer: COMMERCIAL

## 2018-12-03 DIAGNOSIS — R10.2 PELVIC PAIN IN FEMALE: ICD-10-CM

## 2018-12-03 PROCEDURE — 76830 TRANSVAGINAL US NON-OB: CPT

## 2018-12-04 ENCOUNTER — OFFICE VISIT (OUTPATIENT)
Dept: OBGYN | Facility: CLINIC | Age: 38
End: 2018-12-04
Payer: COMMERCIAL

## 2018-12-04 VITALS
HEIGHT: 65 IN | SYSTOLIC BLOOD PRESSURE: 128 MMHG | WEIGHT: 170 LBS | BODY MASS INDEX: 28.32 KG/M2 | DIASTOLIC BLOOD PRESSURE: 60 MMHG

## 2018-12-04 DIAGNOSIS — N83.202 LEFT OVARIAN CYST: Primary | ICD-10-CM

## 2018-12-04 DIAGNOSIS — N71.9 ENDOMETRITIS: ICD-10-CM

## 2018-12-04 PROCEDURE — 99213 OFFICE O/P EST LOW 20 MIN: CPT | Performed by: FAMILY MEDICINE

## 2018-12-04 NOTE — PROGRESS NOTES
"SUBJECTIVE:  Laurie Diaz is an 38 year old  woman who presents for   Gyn follow-up exam. She was seen on 2018, for Endometritis & pelvic pain.   Last time her treatment was US & antibiotic to treat infection.      US Results:  - \"IMPRESSION:   1. 3.3 cm left ovarian simple cyst. This is nonspecific, but most  likely a functional ovarian cyst.  2. Otherwise, unremarkable appearance of the uterus and ovaries.  3. A trace amount of nonspecific free fluid in the pelvis.\"        Today in Clinic:  - Pt reports that she is feeling much better, \"not back to 100%\" but improving since starting the antibiotic [augmentin]. There is mild residual soreness, but not close to her pain before treatment.        Past Medical History:   Diagnosis Date     Anxiety      Hypothyroidism      Paresthesia           Family History   Problem Relation Age of Onset     Hypothyroidism Mother      Thyroid Disease Mother      Hypertension Father      Heart Disease Father      heart attack     Diabetes Father      Unknown/Adopted Father        Past Surgical History:   Procedure Laterality Date     ORTHOPEDIC SURGERY  2015    Wirst surgery for torn ligaments       Current Outpatient Prescriptions   Medication     amoxicillin-clavulanate (AUGMENTIN) 875-125 MG tablet     clobetasol (TEMOVATE) 0.05 % cream     levothyroxine (SYNTHROID, LEVOTHROID) 25 MCG tablet     Prenatal Vit-Fe Fumarate-FA (PRENATAL MULTIVITAMIN  PLUS IRON) 27-0.8 MG TABS     No current facility-administered medications for this visit.      No Known Allergies    Social History   Substance Use Topics     Smoking status: Never Smoker     Smokeless tobacco: Never Used     Alcohol use 0.0 oz/week      Comment: infrequent       Review Of Systems  Ears/Nose/Throat: negative  Respiratory: No shortness of breath, dyspnea on exertion, cough, or hemoptysis  Cardiovascular: negative  Gastrointestinal: negative  Genitourinary: negative  Constitutional, HEENT, cardiovascular, " "pulmonary, GI, , musculoskeletal, neuro, skin, endocrine and psych systems are negative, except as otherwise noted.      This document serves as a record of the services and decisions personally performed and made by Rose Stauffer DO. It was created on her behalf by Jennifer Oneill, a trained medical scribe. The creation of this document is based the provider's statements to the medical scribe.  Scribe Jennifer Oneill 3:31 PM, 2018    OBJECTIVE:  /60  Ht 1.645 m (5' 4.75\")  Wt 77.1 kg (170 lb)  LMP 11/15/2018 (Exact Date)  BMI 28.51 kg/m2  General appearance: healthy, alert and no distress  Skin: Skin color, texture, turgor normal. No rashes or lesions.  Lungs: negative, Percussion normal. Good diaphragmatic excursion. Lungs clear  Heart: negative, PMI normal. No lifts, heaves, or thrills. RRR. No murmurs, clicks gallops or rub  Abdomen: Abdomen soft, non-tender. BS normal. No masses, organomegaly          ASSESSMENT:  Laurie Diaz is an 38 year old  woman who presents for   Gyn follow-up exam. She was seen on 2018, for endometritis & pelvic pain.    PLAN:  Dx: Endometritis  1)  Repeat US in 8 weeks; Finish Augmentin, if improvement does not continue or symptoms worsen -- will add Rocephin injection & Flagyl to treat possible PID.  Her biopsy is negative, but her symptoms improved on augmentin, suspect endometritis occurring after miscarriage.    - Can also order CT if pain returns  2)  May proceed with trying for pregnancy at this time  3)  Return to clinic as needed, or for annual health maintenance.       Rx:  None      The information in this document, created by the medical scribe for me, accurately reflects the services I personally performed and the decisions made by me. I have reviewed and approved this document for accuracy prior to leaving the patient care area.  3:31 PM, 18    Dr. Rose Stauffer DO    OB/GYN   [facility]    "

## 2018-12-04 NOTE — MR AVS SNAPSHOT
After Visit Summary   12/4/2018    Laurie Diaz    MRN: [id number]           Patient Information     Date Of Birth          1980        Visit Information        Provider Department      12/4/2018 3:15 PM Rose Stauffer, DO [facility]        Today's Diagnoses     Left ovarian cyst    -  1    Endometritis          Care Instructions    Return as needed     Dr. Rose Stauffer, DO    Obstetrics and Gynecology  [facility]                 Follow-ups after your visit        Future tests that were ordered for you today     Open Future Orders        Priority Expected Expires Ordered    US Pelvic Complete with Transvaginal Routine 12/4/2018 6/2/2019 12/4/2018            Who to contact     If you have questions or need follow up information about today's clinic visit or your schedule please contact [facility] directly at [phone].  Normal or non-critical lab and imaging results will be communicated to you by CMEhart, letter or phone within 4 business days after the clinic has received the results. If you do not hear from us within 7 days, please contact the clinic through CMEhart or phone. If you have a critical or abnormal lab result, we will notify you by phone as soon as possible.  Submit refill requests through [employer] or call your pharmacy and they will forward the refill request to us. Please allow 3 business days for your refill to be completed.          Additional Information About Your Visit        MyChart Information     [employer] gives you secure access to your electronic health record. If you see a primary care provider, you can also send messages to your care team and make appointments. If you have questions, please call your primary care clinic.  If you do not have a primary care provider, please call [phone] and they will assist you.        Care EveryWhere ID     This is your Care EveryWhere ID. This could be used by  "other organizations to access your [facility] medical records  [id number]        Your Vitals Were     Height Last Period BMI (Body Mass Index)             5' 4.75\" (1.645 m) 11/15/2018 (Exact Date) 28.51 kg/m2          Blood Pressure from Last 3 Encounters:   12/04/18 128/60   11/27/18 128/78   11/26/18 120/80    Weight from Last 3 Encounters:   12/04/18 170 lb (77.1 kg)   11/27/18 168 lb (76.2 kg)   11/26/18 168 lb 11.2 oz (76.5 kg)               Primary Care Provider Office Phone # Fax #    [facility] ([facility] [phone]       [address] [place] [address]        Equal Access to Services     JAYNE CANDELARIO : Hadii cullen johnsono Soreji, waaxda luqadaha, qaybta kaalmada adeegyada, tere smallwood . So [facility] [phone].    ATENCIÓN: Si habla español, tiene a fleming disposición servicios gratuitos de asistencia lingüística. Mike al [phone].    We comply with applicable federal civil rights laws and [place] laws. We do not discriminate on the basis of race, color, national origin, age, disability, sex, sexual orientation, or gender identity.            Thank you!     Thank you for choosing [facility]  for your care. Our goal is always to provide you with excellent care. Hearing back from our patients is one way we can continue to improve our services. Please take a few minutes to complete the written survey that you may receive in the mail after your visit with us. Thank you!             Your Updated Medication List - Protect others around you: Learn how to safely use, store and throw away your medicines at www.disposemymeds.org.          This list is accurate as of 12/4/18  3:39 PM.  Always use your most recent med list.                   Brand Name Dispense Instructions for use Diagnosis    amoxicillin-clavulanate 875-125 MG tablet    AUGMENTIN    28 tablet    Take 1 tablet by mouth 2 times daily for 14 days    Pelvic pain in " female, Endometritis       clobetasol 0.05 % external cream    TEMOVATE          levothyroxine 25 MCG tablet    SYNTHROID/LEVOTHROID    90 tablet    Take 1 tablet (25 mcg) by mouth daily    Congenital hypothyroidism with diffuse goiter       prenatal multivitamin w/iron 27-0.8 MG tablet      Take 1 tablet by mouth daily

## 2018-12-04 NOTE — NURSING NOTE
"Chief Complaint   Patient presents with     RECHECK     endometrial biopsy done 18--had US 12/3/18--discuss results       Initial /60  Ht 5' 4.75\" (1.645 m)  Wt 170 lb (77.1 kg)  LMP 11/15/2018 (Exact Date)  BMI 28.51 kg/m2 Estimated body mass index is 28.51 kg/(m^2) as calculated from the following:    Height as of this encounter: 5' 4.75\" (1.645 m).    Weight as of this encounter: 170 lb (77.1 kg).  BP completed using cuff size: regular    Questioned patient about current smoking habits.  Pt. has never smoked.          The following HM Due: NONE               "

## 2018-12-04 NOTE — PATIENT INSTRUCTIONS
Return as needed     Dr. Rose Stauffer, DO    Obstetrics and Gynecology  [facility] [place] [facility] [place]

## 2018-12-31 ENCOUNTER — TRANSFERRED RECORDS (OUTPATIENT)
Dept: HEALTH INFORMATION MANAGEMENT | Facility: CLINIC | Age: 38
End: 2018-12-31

## 2019-01-21 ENCOUNTER — TRANSFERRED RECORDS (OUTPATIENT)
Dept: HEALTH INFORMATION MANAGEMENT | Facility: CLINIC | Age: 39
End: 2019-01-21

## 2019-01-28 ENCOUNTER — ANCILLARY PROCEDURE (OUTPATIENT)
Dept: ULTRASOUND IMAGING | Facility: CLINIC | Age: 39
End: 2019-01-28
Attending: FAMILY MEDICINE
Payer: COMMERCIAL

## 2019-01-28 DIAGNOSIS — N83.202 LEFT OVARIAN CYST: ICD-10-CM

## 2019-01-28 PROCEDURE — 76830 TRANSVAGINAL US NON-OB: CPT | Performed by: OBSTETRICS & GYNECOLOGY

## 2019-01-28 PROCEDURE — 76856 US EXAM PELVIC COMPLETE: CPT | Performed by: OBSTETRICS & GYNECOLOGY

## 2019-02-01 ENCOUNTER — OFFICE VISIT (OUTPATIENT)
Dept: FAMILY MEDICINE | Facility: CLINIC | Age: 39
End: 2019-02-01
Payer: COMMERCIAL

## 2019-02-01 VITALS
HEART RATE: 78 BPM | TEMPERATURE: 98.3 F | SYSTOLIC BLOOD PRESSURE: 130 MMHG | WEIGHT: 174.5 LBS | RESPIRATION RATE: 16 BRPM | DIASTOLIC BLOOD PRESSURE: 80 MMHG | BODY MASS INDEX: 29.07 KG/M2 | OXYGEN SATURATION: 100 % | HEIGHT: 65 IN

## 2019-02-01 DIAGNOSIS — K62.5 RECTAL BLEEDING: ICD-10-CM

## 2019-02-01 DIAGNOSIS — R10.2 PERINEAL PAIN IN FEMALE: Primary | ICD-10-CM

## 2019-02-01 LAB
BASOPHILS # BLD AUTO: 0 10E9/L (ref 0–0.2)
BASOPHILS NFR BLD AUTO: 0.2 %
BETA HCG QUAL IFA URINE: NEGATIVE
DIFFERENTIAL METHOD BLD: NORMAL
EOSINOPHIL # BLD AUTO: 0.1 10E9/L (ref 0–0.7)
EOSINOPHIL NFR BLD AUTO: 1.3 %
ERYTHROCYTE [DISTWIDTH] IN BLOOD BY AUTOMATED COUNT: 12.2 % (ref 10–15)
HCT VFR BLD AUTO: 41.2 % (ref 35–47)
HGB BLD-MCNC: 13.8 G/DL (ref 11.7–15.7)
LYMPHOCYTES # BLD AUTO: 3.5 10E9/L (ref 0.8–5.3)
LYMPHOCYTES NFR BLD AUTO: 35.9 %
MCH RBC QN AUTO: 30.1 PG (ref 26.5–33)
MCHC RBC AUTO-ENTMCNC: 33.5 G/DL (ref 31.5–36.5)
MCV RBC AUTO: 90 FL (ref 78–100)
MONOCYTES # BLD AUTO: 0.7 10E9/L (ref 0–1.3)
MONOCYTES NFR BLD AUTO: 7.4 %
NEUTROPHILS # BLD AUTO: 5.4 10E9/L (ref 1.6–8.3)
NEUTROPHILS NFR BLD AUTO: 55.2 %
PLATELET # BLD AUTO: 283 10E9/L (ref 150–450)
RBC # BLD AUTO: 4.58 10E12/L (ref 3.8–5.2)
WBC # BLD AUTO: 9.7 10E9/L (ref 4–11)

## 2019-02-01 PROCEDURE — 99214 OFFICE O/P EST MOD 30 MIN: CPT | Performed by: FAMILY MEDICINE

## 2019-02-01 PROCEDURE — 36415 COLL VENOUS BLD VENIPUNCTURE: CPT | Performed by: FAMILY MEDICINE

## 2019-02-01 PROCEDURE — 85025 COMPLETE CBC W/AUTO DIFF WBC: CPT | Performed by: FAMILY MEDICINE

## 2019-02-01 PROCEDURE — 84703 CHORIONIC GONADOTROPIN ASSAY: CPT | Performed by: FAMILY MEDICINE

## 2019-02-01 ASSESSMENT — MIFFLIN-ST. JEOR: SCORE: 1478.44

## 2019-02-01 NOTE — PATIENT INSTRUCTIONS
Increase your fluid and fiber intake, as recommended.    If further episodes of rectal bleeding, proceed with the Colonoscopy study, as discussed.    Please check with your insurance regarding Colonoscopy coverage, as recommended.    Recheck a pregnancy test in 1 week (assuming you do not get your usual period), as discussed.      Follow-up with your usual provider in 2 weeks, at which time further tests may be considered, only as appropriate.      Follow-up in the ER with any severe/worsening pain, uncontrolled vomiting, or other severe/emergent symptoms.

## 2019-02-01 NOTE — PROGRESS NOTES
"SUBJECTIVE:   Laurie Diaz is a 38 year old female presenting with a chief complaint of   Chief Complaint   Patient presents with     Pelvic Pain       She is an established patient of [facility].    HPI: The patient is a 38-year-old G2 para 1011 female, with history of persistent perineal pain, which is located between the vagina and rectum.  This is described as a \"bothersome\" pain, which is 3-4/10 severity at its worst.  The patient's pain originally started in 11/2018, 6 weeks after a spontaneous miscarriage.  Patient has seen OB/GYN.  She was prescribed Augmentin 11/2018 for presumed endometritis, which initially seemed to improve her pain.  Patient states she is uncertain if this was a placebo effect.  Patient did not require a D&C, but she had a negative endometrial biopsy and Pap smear November 2018.  Patient had a follow-up Pelvic Ultrasound earlier this week (1/28/19), which showed a few Nabothian cysts.  The previously seen left ovarian cyst was noted to be decreasing in size on her 1/28/19 Pelvic Ultrasound, as reviewed with patient today.  LMP 1/10/19, currently trying to get pregnant.  Patient had unprotected intercourse twice last week.  Patient is taking prenatal vitamins, avoiding smoking and alcohol.  Weight has increased a few pounds, which patient relates to dietary indiscretions.  No fever, chills, nausea, vomiting, abdominal pain, back pain, dyspareunia, vaginal discharge, UTI symptoms, hematuria, constipation, diarrhea, history of trauma/injury, concern for STD, or IV drug use.  Patient continues to have paresthesias involving her inner thighs (occasionally involving her feet), which she feels are an extension of her perineal pain.  No history of incontinence or lower extremity weakness.      Patient has had 2 episodes of rectal bleeding (blood noted on toilet paper) in the recent past.  Family history includes a father who is adopted.  No known family history of colon cancer, but patient is " "concerned that her pain and rectal bleeding could represent a malignancy.  Patient has a persistent hemorrhoid the past 10 years, which she feels may be the source of her bleeding.  CBC, CMP, and Urinalysis were all within normal limits/negative 11/2018, as was her Wet Prep and GC/Chlamydia testing.    Review of Systems - See above.    Patient Active Problem List   Diagnosis     Acquired hypothyroidism     Paresthesia     Need for Tdap vaccination     AMA (advanced maternal age) multigravida 35+     Anxiety     Past Medical History:   Diagnosis Date     Anxiety      Hypothyroidism      Paresthesia       No Known Allergies    Family History   Problem Relation Age of Onset     Hypothyroidism Mother      Thyroid Disease Mother      Hypertension Father      Heart Disease Father         heart attack     Diabetes Father      Unknown/Adopted Father      Current Outpatient Medications   Medication Sig Dispense Refill     clobetasol (TEMOVATE) 0.05 % cream        levothyroxine (SYNTHROID, LEVOTHROID) 25 MCG tablet Take 1 tablet (25 mcg) by mouth daily 90 tablet 3     Prenatal Vit-Fe Fumarate-FA (PRENATAL MULTIVITAMIN  PLUS IRON) 27-0.8 MG TABS Take 1 tablet by mouth daily       Social History     Tobacco Use     Smoking status: Never Smoker     Smokeless tobacco: Never Used   Substance Use Topics     Alcohol use: Yes     Alcohol/week: 0.0 oz     Comment: infrequent       OBJECTIVE  /80 (BP Location: Right arm, Patient Position: Chair, Cuff Size: Adult Regular)   Pulse 78   Temp 98.3  F (36.8  C) (Oral)   Resp 16   Ht 1.645 m (5' 4.75\")   Wt 79.2 kg (174 lb 8 oz)   LMP 01/10/2019 (Exact Date)   SpO2 100%   Breastfeeding? No   BMI 29.26 kg/m      Physical Exam    GENERAL APPEARANCE:  Awake, alert, and in no acute distress.  PSYCHIATRIC:  Pleasant, smiling affect.  HEENT:  Sclera anicteric.  No conjunctivitis.  Mucous membranes moist.  Neck:  Spontaneous full range of motion.  HEART:  Regular rate and rhythm.  No " murmurs, rubs, or gallops.  LUNGS:  No respiratory distress.  No wheezes, rales, or rhonchi   ABDOMEN:   Soft and not distended, without organomegaly.  No tenderness to palpation.  BACK: No CVA tenderness.  No midline or paraspinous muscle tenderness.  Lower extremities exhibit 5/5 strength, with 2/5 patellar and Achilles reflexes noted.  Gait within normal limits.  :  Deferred.  RECTAL: Performed post risk/benefits discussion.  Chaperone declined prior to exam.  No evidence of rectal fissure, but there is a 1.5 cm, flesh-colored, skin tag-like hemorrhoid noted at the 6 o'clock position, with evidence of recent bleeding noted.  Perineal pain was not reproduced with digital rectal exam and anoscopy today, which were performed without patient discomfort.  Cannot rule out an internal hemorrhoid, based on anoscopy today.   SKIN:  No rash.    Labs:  Results for orders placed or performed in visit on 02/01/19 (from the past 24 hour(s))   Beta HCG qual IFA urine   Result Value Ref Range    Beta HCG Qual IFA Urine Negative NEG^Negative      CBC with platelets differential   Result Value Ref Range    WBC 9.7 4.0 - 11.0 10e9/L    RBC Count 4.58 3.8 - 5.2 10e12/L    Hemoglobin 13.8 11.7 - 15.7 g/dL    Hematocrit 41.2 35.0 - 47.0 %    MCV 90 78 - 100 fl    MCH 30.1 26.5 - 33.0 pg    MCHC 33.5 31.5 - 36.5 g/dL    RDW 12.2 10.0 - 15.0 %    Platelet Count 283 150 - 450 10e9/L    Diff Method Automated Method     % Neutrophils 55.2 %    % Lymphocytes 35.9 %    % Monocytes 7.4 %    % Eosinophils 1.3 %    % Basophils 0.2 %    Absolute Neutrophil 5.4 1.6 - 8.3 10e9/L    Absolute Lymphocytes 3.5 0.8 - 5.3 10e9/L    Absolute Monocytes 0.7 0.0 - 1.3 10e9/L    Absolute Eosinophils 0.1 0.0 - 0.7 10e9/L    Absolute Basophils 0.0 0.0 - 0.2 10e9/L       Lumbar Spine X-ray: Deferred, as cannot rule out early pregnancy, despite the negative Urine Pregnancy Test today.    ASSESSMENT:      ICD-10-CM    1. Perineal pain in female, previously  evaluated by OB/GYN.  Patient had some improvement with Augmentin, as prescribed in 11/2018.  Recent pelvic ultrasound 1/28/19 showed evidence of Nabothian cysts, but the previously noted simple left ovarian cyst was decreasing in size.  See #2 below.  Differential of perineal pain and inner thigh paresthesias was considered, including spine-related etiology.  Doubt cauda equina, based on absence of incontinence or weakness. N94.9 Beta HCG qual IFA urine     CBC with platelets differential     GASTROENTEROLOGY ADULT REF PROCEDURE ONLY   2. Rectal bleeding x2 episodes, likely secondary to external hemorrhoid.  Differential was considered, especially given the ongoing perineal pain. K62.5 CBC with platelets differential     GASTROENTEROLOGY ADULT REF PROCEDURE ONLY     PLAN:      Patient will discuss the recently seen Nabothian cysts (1/28/19 Ultrasound) with her OB/GYN.    Patient agrees with holding off on further Augmentin treatment, pending her discussion with OB/GYN.    Patient agrees with holding off on x-ray studies or CT imaging, given the risk for early pregnancy at this time.      Patient is in agreement with the following plan:    Patient Instructions     Increase your fluid and fiber intake, as recommended.    If further episodes of rectal bleeding, proceed with the Colonoscopy study, as discussed.    Please check with your insurance regarding Colonoscopy coverage, as recommended.    Recheck a pregnancy test in 1 week (assuming you do not get your usual period), as discussed.      Follow-up with your usual provider in 2 weeks, at which time further tests may be considered, only as appropriate.      Follow-up in the ER with any severe/worsening pain, uncontrolled vomiting, or other severe/emergent symptoms.    Discussed risks and benefits of treatment strategies, as noted in the Assessment and Plan sections.    The patient was discharged ambulatory and in stable condition post discussion of follow up.      Joanne Eric MD  [facility]

## 2019-02-07 ENCOUNTER — HOSPITAL ENCOUNTER (OUTPATIENT)
Facility: CLINIC | Age: 39
End: 2019-02-07
Attending: INTERNAL MEDICINE | Admitting: INTERNAL MEDICINE
Payer: COMMERCIAL

## 2019-02-07 DIAGNOSIS — N83.202 LEFT OVARIAN CYST: Primary | ICD-10-CM

## 2019-02-10 ENCOUNTER — NURSE TRIAGE (OUTPATIENT)
Dept: NURSING | Facility: CLINIC | Age: 39
End: 2019-02-10

## 2019-02-10 NOTE — TELEPHONE ENCOUNTER
Laurie calling to request to speak with on call provider regarding questions that she has about Progesterone.     Disposition: Paged Dr Almaguer on call MD for [facility], through [employer] web, at 10:28AM. To call patient back directly at [phone]. Advised Laurie to call back if she doesn't hear from the provider in 20 minutes. She verbalized understanding.    Azul Quintanilla RN/FNA      Reason for Disposition    Caller has URGENT medication question about med that PCP prescribed and triager unable to answer question    Protocols used: MEDICATION QUESTION CALL-ADULT-

## 2019-02-11 ENCOUNTER — OFFICE VISIT (OUTPATIENT)
Dept: FAMILY MEDICINE | Facility: CLINIC | Age: 39
End: 2019-02-11
Payer: COMMERCIAL

## 2019-02-11 VITALS
DIASTOLIC BLOOD PRESSURE: 80 MMHG | HEART RATE: 75 BPM | TEMPERATURE: 98 F | BODY MASS INDEX: 29.07 KG/M2 | SYSTOLIC BLOOD PRESSURE: 123 MMHG | HEIGHT: 65 IN | WEIGHT: 174.5 LBS | OXYGEN SATURATION: 99 %

## 2019-02-11 DIAGNOSIS — R10.2 PELVIC PAIN IN FEMALE: Primary | ICD-10-CM

## 2019-02-11 PROCEDURE — 99213 OFFICE O/P EST LOW 20 MIN: CPT | Performed by: PHYSICIAN ASSISTANT

## 2019-02-11 ASSESSMENT — MIFFLIN-ST. JEOR: SCORE: 1478.44

## 2019-02-11 NOTE — PROGRESS NOTES
SUBJECTIVE:   Laurie Diaz is a 38 year old female who presents to clinic today for the following health issues:      Follow up on pelvic pain: And he has had persistent pelvic pain located between vagina and rectum and saw OB/GYN for this and had a pelvic ultrasound and it was presumed to be endometritis and was put on Augmentin which did seem to help improve her pain but it still there.  Pelvic ultrasound failed to show much except for a left ovarian cyst which was not the cause of her pain.    Laurie  just found out she is pregnant, approximately 4 weeks along.  She has had some very slight blood on her toilet paper a couple times in the past few months.  She was supposed to get a colonoscopy this month but now that she is pregnant wondering if that is wise.  She states she had a miscarriage 2 months ago        Problem list and histories reviewed & adjusted, as indicated.  Additional history: as documented    Current Outpatient Medications   Medication Sig Dispense Refill     clobetasol (TEMOVATE) 0.05 % cream        levothyroxine (SYNTHROID, LEVOTHROID) 25 MCG tablet Take 1 tablet (25 mcg) by mouth daily 90 tablet 3     Prenatal Vit-Fe Fumarate-FA (PRENATAL MULTIVITAMIN  PLUS IRON) 27-0.8 MG TABS Take 1 tablet by mouth daily       progesterone (PROMETRIUM) 100 MG capsule Take 1 capsule (100 mg) by mouth daily 60 capsule 11     BP Readings from Last 3 Encounters:   02/11/19 123/80   02/01/19 130/80   12/04/18 128/60    Wt Readings from Last 3 Encounters:   02/11/19 79.2 kg (174 lb 8 oz)   02/01/19 79.2 kg (174 lb 8 oz)   12/04/18 77.1 kg (170 lb)                    Reviewed and updated as needed this visit by clinical staff       Reviewed and updated as needed this visit by Provider         ROS:  Constitutional, HEENT, cardiovascular, pulmonary, gi and gu systems are negative, except as otherwise noted.    OBJECTIVE:                                                    /80 (BP Location: Right arm, Patient  "Position: Chair, Cuff Size: Adult Large)   Pulse 75   Temp 98  F (36.7  C) (Oral)   Ht 1.645 m (5' 4.75\")   Wt 79.2 kg (174 lb 8 oz)   SpO2 99%   BMI 29.26 kg/m    Body mass index is 29.26 kg/m .  GENERAL APPEARANCE: healthy, alert and no distress  No exam          ASSESSMENT/PLAN:                                                    1. Pelvic pain in female  Advised that she needs to follow-up with ob/gyn to discuss and advised to discuss whether colonoscopy at this stage in pregnancy is wise.   - OB/GYN REFERRAL          Ramona Ann Aaseby-Aguilera, PA-C  [facility]    "

## 2019-02-14 DIAGNOSIS — O09.291 HISTORY OF MISCARRIAGE, CURRENTLY PREGNANT, FIRST TRIMESTER: ICD-10-CM

## 2019-02-14 PROCEDURE — 36415 COLL VENOUS BLD VENIPUNCTURE: CPT | Performed by: FAMILY MEDICINE

## 2019-02-14 PROCEDURE — 84144 ASSAY OF PROGESTERONE: CPT | Performed by: FAMILY MEDICINE

## 2019-02-15 LAB — PROGEST SERPL-MCNC: 29.2 NG/ML

## 2019-02-22 ENCOUNTER — OFFICE VISIT (OUTPATIENT)
Dept: OBGYN | Facility: CLINIC | Age: 39
End: 2019-02-22
Payer: COMMERCIAL

## 2019-02-22 VITALS — SYSTOLIC BLOOD PRESSURE: 112 MMHG | WEIGHT: 171 LBS | BODY MASS INDEX: 28.68 KG/M2 | DIASTOLIC BLOOD PRESSURE: 72 MMHG

## 2019-02-22 DIAGNOSIS — O26.91: Primary | ICD-10-CM

## 2019-02-22 PROCEDURE — 99213 OFFICE O/P EST LOW 20 MIN: CPT | Performed by: OBSTETRICS & GYNECOLOGY

## 2019-02-22 ASSESSMENT — ENCOUNTER SYMPTOMS
CHILLS: 0
FEVER: 0
DYSURIA: 0
VOMITING: 0
DIARRHEA: 0
FREQUENCY: 0
CONSTIPATION: 0
NAUSEA: 0

## 2019-02-22 NOTE — NURSING NOTE
"Chief Complaint   Patient presents with     Pelvic Pain     pelvic pain since mid nov. - US done 19 - positive home pregnancy test 2 weeks ago - LMP 01/10/19       Initial /72 (BP Location: Left arm, Patient Position: Chair, Cuff Size: Adult Regular)   Wt 77.6 kg (171 lb)   LMP 01/10/2019 (Exact Date)   BMI 28.68 kg/m   Estimated body mass index is 28.68 kg/m  as calculated from the following:    Height as of 19: 1.645 m (5' 4.75\").    Weight as of this encounter: 77.6 kg (171 lb).  BP completed using cuff size: regular    Questioned patient about current smoking habits.  Pt. has never smoked.          The following HM Due: NONE      Suri Plata ma               "

## 2019-02-22 NOTE — PROGRESS NOTES
SUBJECTIVE:                                                   Laurie Diaz is a 38 year old female who presents to clinic today with the Chief Complaint of:  Patient presents with:  Pelvic Pain: pelvic pain since mid nov. - US done 19 - positive home pregnancy test 2 weeks ago - LMP 01/10/19      Concern - Early pregnancy concerns  Onset: since finding out she was pregnant 19 with pos HPT    Description:   Had some irritation in her perineum between her vagina and anus that is the same as what she had 2018 when she Dr. Stauffer for the same issue.  She was given a Rx and it did improve, but sx's seemed to recur, but now are not bothering her now.  But since she had an appt anyway she wanted to be seen.  Pt has her first nurse OB visit in 3 days.    Intensity: mild    Progression of Symptoms:  improving    Accompanying Signs & Symptoms:  No VB, discharge.    Previous history of similar problem:   As noted above    Precipitating factors:   Worsened by: Nothing    Alleviating factors:  Improved by: Nothing    Therapies Tried and outcome: Nothing this time.      Patient's last menstrual period was 01/10/2019 (exact date)..   Patient is sexually active, .  Using none for contraception.    reports that  has never smoked. she has never used smokeless tobacco.    Problem list and histories reviewed & adjusted, as indicated.  Additional history: as documented.    Patient Active Problem List   Diagnosis     Acquired hypothyroidism     Paresthesia     Need for Tdap vaccination     AMA (advanced maternal age) multigravida 35+     Anxiety     Past Surgical History:   Procedure Laterality Date     ORTHOPEDIC SURGERY  2015    Wirst surgery for torn ligaments      Social History     Tobacco Use     Smoking status: Never Smoker     Smokeless tobacco: Never Used   Substance Use Topics     Alcohol use: Yes     Alcohol/week: 0.0 oz     Comment: infrequent      Problem (# of Occurrences) Relation (Name,Age of  Onset)    Diabetes (1) Father (Jose Herrera)    Heart Disease (1) Father (Jose Herrera): heart attack    Hypertension (1) Father (Jose Herrera)    Hypothyroidism (1) Mother (Adina Herrera)    Thyroid Disease (1) Mother (Adina Herrera)    Unknown/Adopted (1) Father (Jose Herrera)            Current Outpatient Medications   Medication Sig     clobetasol (TEMOVATE) 0.05 % cream      levothyroxine (SYNTHROID, LEVOTHROID) 25 MCG tablet Take 1 tablet (25 mcg) by mouth daily     Prenatal Vit-Fe Fumarate-FA (PRENATAL MULTIVITAMIN  PLUS IRON) 27-0.8 MG TABS Take 1 tablet by mouth daily     progesterone (PROMETRIUM) 100 MG capsule Take 1 capsule (100 mg) by mouth daily     No current facility-administered medications for this visit.      No Known Allergies    ROS:  Review of Systems   Constitutional: Negative for chills and fever.   Gastrointestinal: Negative for constipation, diarrhea, nausea and vomiting.        She has had some blood on toilet paper on and off when having BMs and her PMD had wanted her to get a colonoscopy, but she wanted my opinion if I thought that was necessary now that she is pregnant. Of note, her PMD did state Pt has hemorrhoids.   Genitourinary: Negative for dysuria, frequency, vaginal bleeding and vaginal discharge.         OBJECTIVE:     /72 (BP Location: Left arm, Patient Position: Chair, Cuff Size: Adult Regular)   Wt 77.6 kg (171 lb)   LMP 01/10/2019 (Exact Date)   BMI 28.68 kg/m    Body mass index is 28.68 kg/m .    Exam:  Physical Exam  Gen-WD, WN, WF in NAD  Remainder of exam deferred.      ASSESSMENT:                                                      Encounter Diagnoses   Name Primary?     Pregnancy-related symptom in first trimester Yes         PLAN:                                                      1) I spent most of today's visit counseling Pt re: several questions about various subjects that came up. This included the discomfort in her perineum not representing anything significant  "about her pregnancy, and since it is already better no Rx is needed.  Also, the subject of whether to get a colonoscopy was discussed and I advised her that since there is no family Hx of colon CA and she has not had bright red rectal bleeding in her stool, and since this is fairly recent, she can wait until after delivery to pursue it if she still has sx's, sakina since she has known hemorrhoids which are probably the cause.  If rectal bleeding worsens, she should pursue colonoscopy early in pregnancy because after 10 weeks or so her uterus may be too large for colonoscope to maneuver around it. She also asked if the \"new car smell\" in her new car was harmful to her baby, and I reassured her it was not.    2)  Pt to f/u as scheduled for first OB visit on 2/25.    3)  I spent 10 minutes counseling time out of a total visit time of 15 minutes in direct face-to-face counseling and discussion of the above issues with the patient.      Shad Bowers MD  [facility]    "

## 2019-02-25 ENCOUNTER — PRENATAL OFFICE VISIT (OUTPATIENT)
Dept: NURSING | Facility: CLINIC | Age: 39
End: 2019-02-25
Payer: COMMERCIAL

## 2019-02-25 DIAGNOSIS — O09.521 SUPERVISION OF HIGH-RISK PREGNANCY OF ELDERLY MULTIGRAVIDA, FIRST TRIMESTER: Primary | ICD-10-CM

## 2019-02-25 LAB
ABO + RH BLD: NORMAL
ABO + RH BLD: NORMAL
BLD GP AB SCN SERPL QL: NORMAL
BLOOD BANK CMNT PATIENT-IMP: NORMAL
ERYTHROCYTE [DISTWIDTH] IN BLOOD BY AUTOMATED COUNT: 12.5 % (ref 10–15)
HCG UR QL: POSITIVE
HCT VFR BLD AUTO: 39.1 % (ref 35–47)
HGB BLD-MCNC: 13.2 G/DL (ref 11.7–15.7)
MCH RBC QN AUTO: 31.1 PG (ref 26.5–33)
MCHC RBC AUTO-ENTMCNC: 33.8 G/DL (ref 31.5–36.5)
MCV RBC AUTO: 92 FL (ref 78–100)
PLATELET # BLD AUTO: 292 10E9/L (ref 150–450)
RBC # BLD AUTO: 4.24 10E12/L (ref 3.8–5.2)
SPECIMEN EXP DATE BLD: NORMAL
WBC # BLD AUTO: 10.9 10E9/L (ref 4–11)

## 2019-02-25 PROCEDURE — 87086 URINE CULTURE/COLONY COUNT: CPT | Performed by: FAMILY MEDICINE

## 2019-02-25 PROCEDURE — 99207 ZZC PRENATAL VISIT: CPT

## 2019-02-25 PROCEDURE — 87389 HIV-1 AG W/HIV-1&-2 AB AG IA: CPT | Performed by: FAMILY MEDICINE

## 2019-02-25 PROCEDURE — 86900 BLOOD TYPING SEROLOGIC ABO: CPT | Performed by: FAMILY MEDICINE

## 2019-02-25 PROCEDURE — 87340 HEPATITIS B SURFACE AG IA: CPT | Performed by: FAMILY MEDICINE

## 2019-02-25 PROCEDURE — 81025 URINE PREGNANCY TEST: CPT | Performed by: FAMILY MEDICINE

## 2019-02-25 PROCEDURE — 0064U ANTB TP TOTAL&RPR IA QUAL: CPT | Performed by: FAMILY MEDICINE

## 2019-02-25 PROCEDURE — 36415 COLL VENOUS BLD VENIPUNCTURE: CPT | Performed by: FAMILY MEDICINE

## 2019-02-25 PROCEDURE — 85027 COMPLETE CBC AUTOMATED: CPT | Performed by: FAMILY MEDICINE

## 2019-02-25 PROCEDURE — 86762 RUBELLA ANTIBODY: CPT | Performed by: FAMILY MEDICINE

## 2019-02-25 PROCEDURE — 86850 RBC ANTIBODY SCREEN: CPT | Performed by: FAMILY MEDICINE

## 2019-02-25 PROCEDURE — 86901 BLOOD TYPING SEROLOGIC RH(D): CPT | Performed by: FAMILY MEDICINE

## 2019-02-25 SDOH — HEALTH STABILITY: MENTAL HEALTH: HOW OFTEN DO YOU HAVE A DRINK CONTAINING ALCOHOL?: NEVER

## 2019-02-25 NOTE — PROGRESS NOTES
6w4d  Estimated Date of Delivery: Oct 17, 2019      NPN nurse class. Prenatal book and folder (containing standard educational hand-outs and brochures) given to patient. Information in folder reviewed. Questions answered.     Discussed and gave written information on options available for 1st and 2nd trimester screening, CVS, amniocentesis, AFP, and QUAD screen plus optimal time-frame for testing. Brochure given on optional screening available to determine Cystic Fibrosis carrier status. Pt instructed to check with insurance regarding coverage of these optional tests. Pt advised to call back if she desires testing or has any questions or concerns.    Prenatal labs obtained. New prenatal visit scheduled on 3/25 with Dr. Stauffer.        Moriah Aaron RN

## 2019-02-26 LAB
BACTERIA SPEC CULT: NO GROWTH
HBV SURFACE AG SERPL QL IA: NONREACTIVE
HIV 1+2 AB+HIV1 P24 AG SERPL QL IA: NONREACTIVE
RUBV IGG SERPL IA-ACNC: 22 IU/ML
SPECIMEN SOURCE: NORMAL
T PALLIDUM AB SER QL: NONREACTIVE

## 2019-03-13 DIAGNOSIS — O09.521 SUPERVISION OF HIGH-RISK PREGNANCY OF ELDERLY MULTIGRAVIDA, FIRST TRIMESTER: ICD-10-CM

## 2019-03-21 NOTE — PROGRESS NOTES
Laurie Diaz is a 38 year old  @ 10w4d wks EGA with Oct 17, 2019 who presents to the clinic for an new ob visit.         Estimated Date of Delivery: Oct 17, 2019  Reviewed nurse intake visit on 2019    Concerns: Pt was seen back in 2018 for endometritis, was treated & found relief but states her pain has since returned. She states it is vulvar pain, with mild radiation down her legs. 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       Patient Active Problem List   Diagnosis     Acquired hypothyroidism     Paresthesia     Need for Tdap vaccination     AMA (advanced maternal age) multigravida 35+     Anxiety     Past Medical History:   Diagnosis Date     Anxiety      Hypothyroidism      Paresthesia      Past Surgical History:   Procedure Laterality Date     ORTHOPEDIC SURGERY  2015    Wirst surgery for torn ligaments     Current Outpatient Medications   Medication Sig Dispense Refill     levothyroxine (SYNTHROID, LEVOTHROID) 25 MCG tablet Take 1 tablet (25 mcg) by mouth daily 90 tablet 3     Prenatal Vit-Fe Fumarate-FA (PRENATAL MULTIVITAMIN  PLUS IRON) 27-0.8 MG TABS Take 1 tablet by mouth daily       clobetasol (TEMOVATE) 0.05 % cream        progesterone (PROMETRIUM) 100 MG capsule Take 1 capsule (100 mg) by mouth daily (Patient not taking: Reported on 2019) 60 capsule 11       ====================================================  PERSONAL/SOCIAL HISTORY  Social History     Socioeconomic History     Marital status:      Spouse name: Not on file     Number of children: Not on file     Years of education: Not on file     Highest education level: Not on file   Occupational History     Occupation: teacher   Social Needs     Financial resource strain: Not on file     Food insecurity:     Worry: Not on file     Inability: Not on file     Transportation needs:     Medical: Not on file     Non-medical: Not on file   Tobacco Use      "Smoking status: Never Smoker     Smokeless tobacco: Never Used   Substance and Sexual Activity     Alcohol use: No     Alcohol/week: 0.0 oz     Frequency: Never     Comment: infrequent     Drug use: No     Sexual activity: Yes     Partners: Male   Lifestyle     Physical activity:     Days per week: Not on file     Minutes per session: Not on file     Stress: Not on file   Relationships     Social connections:     Talks on phone: Not on file     Gets together: Not on file     Attends Sikhism service: Not on file     Active member of club or organization: Not on file     Attends meetings of clubs or organizations: Not on file     Relationship status: Not on file     Intimate partner violence:     Fear of current or ex partner: Not on file     Emotionally abused: Not on file     Physically abused: Not on file     Forced sexual activity: Not on file   Other Topics Concern     Parent/sibling w/ CABG, MI or angioplasty before 65F 55M? 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        This document serves as a record of the services and decisions personally performed and made by Rose Stauffer DO. It was created on her behalf by Jennifer Oneill, a trained medical scribe. The creation of this document is based the provider's statements to the medical scribe.  Scribe Jennifer Oneill 2:47 PM, March 25, 2019    ====================================================  PHYSICAL EXAM:  /70 (BP Location: Right arm, Patient Position: Sitting, Cuff Size: Adult Regular)   Ht 1.645 m (5' 4.75\")   Wt 77.2 kg (170 lb 1.6 oz)   LMP 01/10/2019 (Exact Date)   BMI 28.53 kg/m     GENERAL:  Pleasant pregnant " female, alert, well groomed.  SKIN:  Warm and dry, without lesions or rashes  HEAD: Symmetrical features.  EYES:  PERRLA,   MOUTH:  Buccal mucosa pink, moist without lesions.    NECK:  Thyroid without enlargement and nodules.  Lymph nodes not palpable.   LUNGS:  Clear to auscultation.  BREAST:  Symmetrical.  No dominant, fixed or suspicious masses are noted.  No skin or nipple changes or axillary nodes.  Self exam is taught and encouraged.  Nipples everted.      HEART:  RRR without murmur.  ABDOMEN: Soft without masses , tenderness or organomegaly.  No CVA tenderness. No scars noted.. FHT normal  MUSCULOSKELETAL:  Full range of motion  EXTREMITIES:  No edema. No significant varicosities.   GENITALIA:  BUS WNL, no lesions noted   VAGINA:  Pink, normal rugae and discharge normal and physiologic,   CERVIX:  smooth, without discharge or CMT and parous os,   firm/ closed 4 cm long.  UTERUS: Anteverted, nontender 12 weeks in size.  ADNEXA: Without masses or tenderness  PELVIS:   Adequate, Pelvis proven to -pelvis not tested.    =========================================  ICSI Routine Prenatal Carfe Guideline  ASSESSMENT/PLAN:  Laurie Diaz is a 38 year old  @ 10w4d  wks EGA with EDC Estimated Date of Delivery: Oct 17, 2019 who presents to the clinic for an new ob visit.        1) Intrauterine pregnancy:  Concerns: Endometritis -- advised to continue monitoring & report any worsening of symptoms or further concerns; CMV risk -- can be tested, otherwise will also look for signs at 18w anatomy US  2) EDUCATION :    RECOMMENDED WEIGHT GAIN: 25-35 lbs.  Instructed on best evidence for: weight gain for her BMI for pregnancy; healthy diet and foods to avoid; exercise and activity during pregnancy;avoiding exposure to toxoplasmosis; and maintenance of a generally healthy lifestyle.   Discussed the harms, benefits, side effects and alternative therapies for current prescribed and OTC medications.  3) Counseled about genetic  screening tests (Innatal, preparent with options, discussed standard panel and other options, 1st trimester screen and targeted anatomy scan and AFP and quad screen if first trimester screening not done) and invasive definitive testing (chorionic villus sampling and genetic amniocentesis).  Patient wishes to undergo innatal & AFP screening.  4) Routine care: Desires innatal & AFP screening; GC - pending  5) Subchorionic hemorrhage: Will monitor with serial US q every 3-4 weeks until resolved  6) AMA: Growth US q every 3-4 weeks @ 32w & Weekly BPP's @ 36w  7) Hypothyroidism: TSH check every trimester  8) Return: 4 weeks        The information in this document, created by the medical scribe for me, accurately reflects the services I personally performed and the decisions made by me. I have reviewed and approved this document for accuracy prior to leaving the patient care area.  2:47 PM, 03/25/19    Dr. Rose Stauffer, DO    OB/GYN   [facility]

## 2019-03-22 ENCOUNTER — TELEPHONE (OUTPATIENT)
Dept: OBGYN | Facility: CLINIC | Age: 39
End: 2019-03-22

## 2019-03-22 NOTE — TELEPHONE ENCOUNTER
Discussed with Dr. Jewell who agrees to add to her schedule on Tuesday 3/26 at 2:00pm. Informed patient and made her aware she may need to wait or possibly be rescheduled. Patient expressed understanding and agrees with plan.

## 2019-03-22 NOTE — TELEPHONE ENCOUNTER
----- Message from Ange Cervantes sent at 3/22/2019 10:21 AM CDT -----  Regarding: Request to speak with Dr.. Jewell  Contact: [phone]  Hi,    Pt called and stated that her aunt, Carmen Johnston works with Dr. Carmen Jewell and that [facility] would like pt to schedule her New OB with her. Pt is requesting to reschedule her New OB appointment with [facility] but [facility] is booked out. Please call back pt for further assistance.     Thanks,  Ange Cervantes  Intake Representative   Call Center

## 2019-03-25 ENCOUNTER — PRENATAL OFFICE VISIT (OUTPATIENT)
Dept: OBGYN | Facility: CLINIC | Age: 39
End: 2019-03-25
Payer: COMMERCIAL

## 2019-03-25 VITALS
HEIGHT: 65 IN | BODY MASS INDEX: 28.34 KG/M2 | DIASTOLIC BLOOD PRESSURE: 70 MMHG | WEIGHT: 170.1 LBS | SYSTOLIC BLOOD PRESSURE: 114 MMHG

## 2019-03-25 DIAGNOSIS — E03.9 HYPOTHYROIDISM, UNSPECIFIED TYPE: Primary | ICD-10-CM

## 2019-03-25 DIAGNOSIS — O09.521 ELDERLY MULTIGRAVIDA IN FIRST TRIMESTER: ICD-10-CM

## 2019-03-25 PROCEDURE — 40000791 ZZHCL STATISTIC VERIFI PRENATAL TRISOMY 21,18,13: Mod: 90 | Performed by: FAMILY MEDICINE

## 2019-03-25 PROCEDURE — 87591 N.GONORRHOEAE DNA AMP PROB: CPT | Performed by: FAMILY MEDICINE

## 2019-03-25 PROCEDURE — 84443 ASSAY THYROID STIM HORMONE: CPT | Performed by: FAMILY MEDICINE

## 2019-03-25 PROCEDURE — 99000 SPECIMEN HANDLING OFFICE-LAB: CPT | Performed by: FAMILY MEDICINE

## 2019-03-25 PROCEDURE — 87491 CHLMYD TRACH DNA AMP PROBE: CPT | Performed by: FAMILY MEDICINE

## 2019-03-25 PROCEDURE — 99207 ZZC FIRST OB VISIT: CPT | Performed by: FAMILY MEDICINE

## 2019-03-25 PROCEDURE — 36415 COLL VENOUS BLD VENIPUNCTURE: CPT | Performed by: FAMILY MEDICINE

## 2019-03-25 ASSESSMENT — MIFFLIN-ST. JEOR: SCORE: 1458.48

## 2019-03-25 NOTE — PATIENT INSTRUCTIONS
Return 4 weeks   Return to clinic:  every 4 weeks till 30 weeks, then every 2 weeks till 36 weeks, then weekly till delivery      Phone numbers [place]:  Day/ night [phone] ask for ob triage  Emergency:  Call labor and delivery:  [phone]    What should I call about??    Contraction every 5 minutes for 1 hour 1 minute long (511), bleeding, loss of fluid, headache that doesn't resolve with tylenol, and decreased fetal movement     Start kick counts @ 26-28 weeks   Keep track of movement and discover your normal baby movement pattern   guideline is listed below  Please call if you do not feel the baby move!  We will have you come in for fetal heart rate monitoring:   Perception of at least 10 FMs during 12 hours of normal maternal activity   Perception of least 10 FMs over two hours when the mother is at rest and focused on [facility] Address   [address], [place], [place] [address] [phone]    Dr. Rose Stauffer, DO    OB/GYN   [facility]

## 2019-03-26 ENCOUNTER — OFFICE VISIT (OUTPATIENT)
Dept: OBGYN | Facility: CLINIC | Age: 39
End: 2019-03-26
Attending: OBSTETRICS & GYNECOLOGY
Payer: COMMERCIAL

## 2019-03-26 VITALS
DIASTOLIC BLOOD PRESSURE: 81 MMHG | SYSTOLIC BLOOD PRESSURE: 132 MMHG | BODY MASS INDEX: 28.66 KG/M2 | HEIGHT: 65 IN | WEIGHT: 172 LBS | HEART RATE: 75 BPM

## 2019-03-26 DIAGNOSIS — O09.521 ELDERLY MULTIGRAVIDA IN FIRST TRIMESTER: ICD-10-CM

## 2019-03-26 DIAGNOSIS — E03.9 ACQUIRED HYPOTHYROIDISM: ICD-10-CM

## 2019-03-26 DIAGNOSIS — O09.91 HIGH-RISK PREGNANCY IN FIRST TRIMESTER: Primary | ICD-10-CM

## 2019-03-26 LAB — TSH SERPL DL<=0.005 MIU/L-ACNC: 4.79 MU/L (ref 0.4–4)

## 2019-03-26 PROCEDURE — G0463 HOSPITAL OUTPT CLINIC VISIT: HCPCS | Mod: ZF

## 2019-03-26 RX ORDER — LEVOTHYROXINE SODIUM 50 UG/1
50 TABLET ORAL DAILY
Qty: 30 TABLET | Refills: 1 | Status: SHIPPED | OUTPATIENT
Start: 2019-03-26 | End: 2019-04-26

## 2019-03-26 RX ORDER — LEVOTHYROXINE SODIUM 50 UG/1
50 TABLET ORAL DAILY
Qty: 30 TABLET | Refills: 1 | Status: SHIPPED | OUTPATIENT
Start: 2019-03-26 | End: 2019-03-26

## 2019-03-26 ASSESSMENT — PAIN SCALES - GENERAL: PAINLEVEL: NO PAIN (0)

## 2019-03-26 ASSESSMENT — ANXIETY QUESTIONNAIRES
1. FEELING NERVOUS, ANXIOUS, OR ON EDGE: SEVERAL DAYS
5. BEING SO RESTLESS THAT IT IS HARD TO SIT STILL: NOT AT ALL
3. WORRYING TOO MUCH ABOUT DIFFERENT THINGS: SEVERAL DAYS
6. BECOMING EASILY ANNOYED OR IRRITABLE: NOT AT ALL
GAD7 TOTAL SCORE: 4
2. NOT BEING ABLE TO STOP OR CONTROL WORRYING: SEVERAL DAYS
7. FEELING AFRAID AS IF SOMETHING AWFUL MIGHT HAPPEN: NOT AT ALL

## 2019-03-26 ASSESSMENT — MIFFLIN-ST. JEOR: SCORE: 1471.07

## 2019-03-26 ASSESSMENT — PATIENT HEALTH QUESTIONNAIRE - PHQ9
SUM OF ALL RESPONSES TO PHQ QUESTIONS 1-9: 5
5. POOR APPETITE OR OVEREATING: SEVERAL DAYS

## 2019-03-26 NOTE — PROGRESS NOTES
SUBJECTIVE:    38 year old, female, , 10w5d,  who presents to the clinic today for a transfer of care ob visit.    Feels well. Has had concerns this pregnancy, but today feels well.  Her main questions today are:  1. Patient was exposed to influenza A (daughter), she did not take the Tamiflu prescribed and her symptoms have resolved.  She wonders if tylenol or her fever (max temp 101.8) may have hurt the pregnancy.  2. She was told she has a subchorionic hemorrhage and concerned she may lose the pregnancy, she has not had any bleeding in pregnancy.  3. She has NIPT drawn, desires neural tube defect screening.  She has not yet scheduled her anatomy scan.  4. She was given progesterone suppositories because of her miscarriage, but she did not start them.  5. She lives in [place] and arrived at the hospital 7cm dilated.  She wonders if our hospital is too far.    Estimated Date of Delivery: Oct 17, 2019   Oct 17, 2019 is calculated from Patient's last menstrual period was 01/10/2019 (exact date)..    Confirmed with 8w6d ultrasound, this showed a 1.0 x 0.5 x 2.2 cm suchorionic hemorrhage  She has not had bleeding since her LMP.   She has had mild nausea. Weight loss has not occurred.   This was a planned pregnancy.   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    ===========================================  ROS  PSYCHIATRIC:  Anxiety, symptoms present but stable, not on meds    Social History     Tobacco Use     Smoking status: Never Smoker     Smokeless tobacco: Never Used   Substance Use Topics     Alcohol use: No     Alcohol/week: 0.0 oz     Frequency: Never     Comment: infrequent     History   Drug Use No     History   Smoking Status     Never Smoker   Smokeless Tobacco     Never Used     Social History     "Substance and Sexual Activity      Alcohol use: No        Alcohol/week: 0.0 oz        Frequency: Never        Comment: infrequent    Family History   Problem Relation Age of Onset     Hypothyroidism Mother      Thyroid Disease Mother      Hypertension Father      Heart Disease Father         heart attack     Diabetes Father      Unknown/Adopted Father      ============================================  MEDICAL HISTORY   No Known Allergies          Current Outpatient Medications:      clobetasol (TEMOVATE) 0.05 % cream, , Disp: , Rfl:      levothyroxine (SYNTHROID/LEVOTHROID) 50 MCG tablet, Take 1 tablet (50 mcg) by mouth daily, Disp: 30 tablet, Rfl: 1     Prenatal Vit-Fe Fumarate-FA (PRENATAL MULTIVITAMIN  PLUS IRON) 27-0.8 MG TABS, Take 1 tablet by mouth daily, Disp: , Rfl:     Past Medical History:   Diagnosis Date     Anxiety      Hypothyroidism      Paresthesia        Past Surgical History:   Procedure Laterality Date     ORTHOPEDIC SURGERY  2015    Wirst surgery for torn ligaments            Obstetric History       T1      L1     SAB0   TAB0   Ectopic0   Multiple0   Live Births1       # Outcome Date GA Lbr Sergey/2nd Weight Sex Delivery Anes PTL Lv   3 Current            2 AB 10/2018 8w0d    SAB      1 Term 16 40w2d 10:43 / 01:38 3.74 kg (8 lb 3.9 oz) F Vag-Spont Local, EPI N MARK      Apgar1:  8                Apgar5: 9      chorioamnionitis with first pregnancy    GYN History- No Abnormal Pap Smears, last NIL negative HPV 2018                        Cervical procedures: none                        History of STI: non    I personally reviewed the past social/family/medical and surgical history on the date of service.   I reviewed lab work done at Intake visit with patient.    OBJECTIVE:   PHYSICAL EXAM:  /81   Pulse 75   Ht 1.651 m (5' 5\")   Wt 78 kg (172 lb)   LMP 01/10/2019 (Exact Date)   Breastfeeding? No   BMI 28.62 kg/m    BMI- Body mass index is 28.62 kg/m .,   GENERAL:  " Pleasant pregnant female, alert, cooperative and well groomed.  SKIN:  Warm and dry, without lesions or rashes  Remainder of exam deferred, had NOB exam at [facility] yesterday.    Bedside ultrasound then confirmed heartbeat    ASSESSMENT:  Intrauterine pregnancy 10w5d size consistent with dates  Genetic Screening: NIPT drawn at other clinic, option of amniocentesis given AMA, patient declines.  Plans on AFP only after 15 weeks, level 2 ultrasound ordered.  Encounter Diagnoses   Name Primary?     High-risk pregnancy in first trimester Yes     Elderly multigravida in first trimester      Acquired hypothyroidism         PLAN:  Orders Placed This Encounter   Procedures     [facility] REFERRAL-PREGNANCY     Orders Placed This Encounter   Medications     levothyroxine (SYNTHROID/LEVOTHROID) 50 MCG tablet     Sig: Take 1 tablet (50 mcg) by mouth daily     Dispense:  30 tablet     Refill:  1     - Reviewed subchorionic  Hemorrhage is small and do not need to follow on ultrasound, if bleeding occurs would recommend ultrasound to assess  - Reassured patient that fever, tylenol and ultrasound have not been dangerous to her pregnancy.  - Agree with patient that she does not need progesterone supplementation with no history of  birth and no documentation of cervical shortening.  - TSH is slightly high, recommend increase synthroid to 50mcg, repeat tsh with AFP at 15 weeks.  - Reviewed AMA status, declines amnio, has had NIPT, no results yet.  Referral to [facility] for genetic counseling and level 2 ultrasound.  Plan AFP at 15 weeks.  Reviewed our practice is not to increase surveillance for AMA unless age at delivery is over 40.  - Reviewed CMV status and precautions, especially handwashing. Advised she avoid any known exposures, call with any concerns or symptoms.  - Reviewed use of triage nurse line and contacting the on-call provider after hours for an urgent need such as fever, vagina bleeding, bladder or vaginal  infection, rupture of membranes,  or term labor.    - Reviewed best evidence for: weight gain for her weight and height for pregnancy:  RECOMMENDED WEIGHT GAIN: 15-25 lbs.   healthy diet and foods to avoid; exercise and activity during pregnancy;avoiding exposure to toxoplasmosis; and maintenance of a generally healthy lifestyle.   - Discussed the harms, benefits, side effects and alternative therapies for current prescribed and OTC medications.    - All pt's questions discussed and answered.  Pt verbalized understanding of and agreement to plan of care. Discussed given distance to hospital and somewhat quick first labor that patient not labor long at home.    - Continue scheduled prenatal care and prn if questions or concerns, RTC at 15/16 weeks.    Carmen Jewell MD

## 2019-03-26 NOTE — NURSING NOTE
Chief Complaint   Patient presents with     Prenatal Care     IGNACIO 10 weeks and 5 days   Bettina Urban LPN

## 2019-03-26 NOTE — LETTER
RE: Laurie Diaz  [address] [facility] [address]     Dear Colleague,    Thank you for referring your patient, Laurie Diaz, to the [facility] at [facility]. Please see a copy of my visit note below.    SUBJECTIVE:    38 year old, female, , 10w5d,  who presents to the clinic today for a transfer of care ob visit.    Feels well. Has had concerns this pregnancy, but today feels well.  Her main questions today are:  1. Patient was exposed to influenza A (daughter), she did not take the Tamiflu prescribed and her symptoms have resolved.  She wonders if tylenol or her fever (max temp 101.8) may have hurt the pregnancy.  2. She was told she has a subchorionic hemorrhage and concerned she may lose the pregnancy, she has not had any bleeding in pregnancy.  3. She has NIPT drawn, desires neural tube defect screening.  She has not yet scheduled her anatomy scan.  4. She was given progesterone suppositories because of her miscarriage, but she did not start them.  5. She lives in [place] and arrived at the hospital 7cm dilated.  She wonders if our hospital is too far.    Estimated Date of Delivery: Oct 17, 2019   Oct 17, 2019 is calculated from Patient's last menstrual period was 01/10/2019 (exact date)..    Confirmed with 8w6d ultrasound, this showed a 1.0 x 0.5 x 2.2 cm suchorionic hemorrhage  She has not had bleeding since her LMP.   She has had mild nausea. Weight loss has not occurred.   This was a planned pregnancy.   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    ===========================================  PSYCHIATRIC:  Anxiety, symptoms present but stable, not on meds    Social History     Tobacco Use     Smoking status: Never Smoker     Smokeless  tobacco: Never Used   Substance Use Topics     Alcohol use: No     Alcohol/week: 0.0 oz     Frequency: Never     Comment: infrequent     History   Drug Use No     History   Smoking Status     Never Smoker   Smokeless Tobacco     Never Used     Social History    Substance and Sexual Activity      Alcohol use: No        Alcohol/week: 0.0 oz        Frequency: Never        Comment: infrequent    Family History   Problem Relation Age of Onset     Hypothyroidism Mother      Thyroid Disease Mother      Hypertension Father      Heart Disease Father         heart attack     Diabetes Father      Unknown/Adopted Father      ============================================  MEDICAL HISTORY   No Known Allergies          Current Outpatient Medications:      clobetasol (TEMOVATE) 0.05 % cream, , Disp: , Rfl:      levothyroxine (SYNTHROID/LEVOTHROID) 50 MCG tablet, Take 1 tablet (50 mcg) by mouth daily, Disp: 30 tablet, Rfl: 1     Prenatal Vit-Fe Fumarate-FA (PRENATAL MULTIVITAMIN  PLUS IRON) 27-0.8 MG TABS, Take 1 tablet by mouth daily, Disp: , Rfl:     Past Medical History:   Diagnosis Date     Anxiety      Hypothyroidism      Paresthesia      Past Surgical History:   Procedure Laterality Date     ORTHOPEDIC SURGERY  2015    Wirst surgery for torn ligaments     Obstetric History       T1      L1     SAB0   TAB0   Ectopic0   Multiple0   Live Births1       # Outcome Date GA Lbr Sergey/2nd Weight Sex Delivery Anes PTL Lv   3 Current            2 AB 10/2018 8w0d    SAB      1 Term 07 40w2d 10:43 / 01:38 3.74 kg (8 lb 3.9 oz) F Vag-Spont Local, EPI N MARK      Apgar1:  8                Apgar5: 9      chorioamnionitis with first pregnancy    GYN History- No Abnormal Pap Smears, last NIL negative HPV 2018                        Cervical procedures: none                        History of STI: non    I personally reviewed the past social/family/medical and surgical history on the date of service.   I reviewed lab work  "done at Intake visit with patient.    OBJECTIVE:   PHYSICAL EXAM:  /81   Pulse 75   Ht 1.651 m (5' 5\")   Wt 78 kg (172 lb)   LMP 01/10/2019 (Exact Date)   Breastfeeding? No   BMI 28.62 kg/m     BMI- Body mass index is 28.62 kg/m .,   GENERAL:  Pleasant pregnant female, alert, cooperative and well groomed.  SKIN:  Warm and dry, without lesions or rashes  Remainder of exam deferred, had [facility]B exam at [facility] yesterday.    Bedside ultrasound then confirmed heartbeat    ASSESSMENT:  Intrauterine pregnancy 10w5d size consistent with dates  Genetic Screening: NIPT drawn at other clinic, option of amniocentesis given AMA, patient declines.  Plans on AFP only after 15 weeks, level 2 ultrasound ordered.  Encounter Diagnoses   Name Primary?     High-risk pregnancy in first trimester Yes     Elderly multigravida in first trimester      Acquired hypothyroidism         PLAN:  Orders Placed This Encounter   Procedures     [facility] REFERRAL-PREGNANCY     Orders Placed This Encounter   Medications     levothyroxine (SYNTHROID/LEVOTHROID) 50 MCG tablet     Sig: Take 1 tablet (50 mcg) by mouth daily     Dispense:  30 tablet     Refill:  1     - Reviewed subchorionic  Hemorrhage is small and do not need to follow on ultrasound, if bleeding occurs would recommend ultrasound to assess  - Reassured patient that fever, tylenol and ultrasound have not been dangerous to her pregnancy.  - Agree with patient that she does not need progesterone supplementation with no history of  birth and no documentation of cervical shortening.  - TSH is slightly high, recommend increase synthroid to 50mcg, repeat tsh with AFP at 15 weeks.  - Reviewed AMA status, declines amnio, has had NIPT, no results yet.  Referral to [facility] for genetic counseling and level 2 ultrasound.  Plan AFP at 15 weeks.  Reviewed our practice is not to increase surveillance for AMA unless age at delivery is over 40.  - Reviewed CMV status and " precautions, especially handwashing. Advised she avoid any known exposures, call with any concerns or symptoms.  - Reviewed use of triage nurse line and contacting the on-call provider after hours for an urgent need such as fever, vagina bleeding, bladder or vaginal infection, rupture of membranes,  or term labor.    - Reviewed best evidence for: weight gain for her weight and height for pregnancy:  RECOMMENDED WEIGHT GAIN: 15-25 lbs.   healthy diet and foods to avoid; exercise and activity during pregnancy;avoiding exposure to toxoplasmosis; and maintenance of a generally healthy lifestyle.   - Discussed the harms, benefits, side effects and alternative therapies for current prescribed and OTC medications.    - All pt's questions discussed and answered.  Pt verbalized understanding of and agreement to plan of care. Discussed given distance to hospital and somewhat quick first labor that patient not labor long at home.    - Continue scheduled prenatal care and prn if questions or concerns, RTC at 15/16 weeks.    Again, thank you for allowing me to participate in the care of your patient.      Sincerely,    Carmen Jewell MD

## 2019-03-27 ASSESSMENT — ANXIETY QUESTIONNAIRES: GAD7 TOTAL SCORE: 4

## 2019-04-01 ENCOUNTER — TELEPHONE (OUTPATIENT)
Dept: OBGYN | Facility: CLINIC | Age: 39
End: 2019-04-01

## 2019-04-01 DIAGNOSIS — O09.91 HIGH-RISK PREGNANCY IN FIRST TRIMESTER: ICD-10-CM

## 2019-04-01 NOTE — TELEPHONE ENCOUNTER
Results received from [facility] testing in [facility].  Testing done:  Innatal Prenatal Screen    Action:  Spoke to pt and gave NORMAL results.    Gender: **BOY**  Gender revealed to pt via my chart, per pts request.    Routed to provider as CELESTE GONZALEZ RN

## 2019-04-12 ENCOUNTER — NURSE TRIAGE (OUTPATIENT)
Dept: NURSING | Facility: CLINIC | Age: 39
End: 2019-04-12

## 2019-04-12 ENCOUNTER — OFFICE VISIT (OUTPATIENT)
Dept: URGENT CARE | Facility: URGENT CARE | Age: 39
End: 2019-04-12
Payer: COMMERCIAL

## 2019-04-12 VITALS
TEMPERATURE: 98.8 F | WEIGHT: 172 LBS | DIASTOLIC BLOOD PRESSURE: 72 MMHG | SYSTOLIC BLOOD PRESSURE: 116 MMHG | HEART RATE: 79 BPM | RESPIRATION RATE: 16 BRPM | BODY MASS INDEX: 28.66 KG/M2 | HEIGHT: 65 IN | OXYGEN SATURATION: 100 %

## 2019-04-12 DIAGNOSIS — Z01.84 IMMUNITY STATUS TESTING: Primary | ICD-10-CM

## 2019-04-12 DIAGNOSIS — Z20.820 EXPOSURE TO VARICELLA ZOSTER VIRUS (VZV): ICD-10-CM

## 2019-04-12 PROCEDURE — 86787 VARICELLA-ZOSTER ANTIBODY: CPT | Performed by: PHYSICIAN ASSISTANT

## 2019-04-12 PROCEDURE — 36415 COLL VENOUS BLD VENIPUNCTURE: CPT | Performed by: PHYSICIAN ASSISTANT

## 2019-04-12 PROCEDURE — 99214 OFFICE O/P EST MOD 30 MIN: CPT | Performed by: PHYSICIAN ASSISTANT

## 2019-04-12 PROCEDURE — 99000 SPECIMEN HANDLING OFFICE-LAB: CPT | Performed by: PHYSICIAN ASSISTANT

## 2019-04-12 PROCEDURE — 86747 PARVOVIRUS ANTIBODY: CPT | Mod: 90 | Performed by: PHYSICIAN ASSISTANT

## 2019-04-12 ASSESSMENT — MIFFLIN-ST. JEOR: SCORE: 1471.07

## 2019-04-12 NOTE — TELEPHONE ENCOUNTER
"Patient states is 13 weeks pregnant. Was told by a  provider today that she was diagnosed with shingles.  Patient states is unsure if she's had chickenpox in the past.    Currently describes a \"small rash on my arm x 3 weeks.\"   States it is raised and bumpy. Describes pustule that is open.  Temperature 98.3 (Tympanic).  Denies pain at site of rash.    Protocol-  Shingles- Adult  Care advice reviewed.   Disposition-  See Physician within 4 hours  Caller states understanding of the recommended disposition.   Plans to go to [facility]. Hours and address given to Caller.  Advised to call back if further questions or concerns.     BEAU Jimenez RN  [facility]     Reason for Disposition    [1] Shingles rash AND [2] spots start appearing other places on body    Additional Information    Negative: Difficult to awaken or acting confused  (e.g., disoriented, slurred speech)    Negative: Sounds like a life-threatening emergency to the triager    Negative: Patient sounds very sick or weak to the triager    Negative: [1] Shingles rash (matches SYMPTOMS) AND [2] weak immune system (e.g., HIV positive,  cancer chemotherapy, chronic steroid treatment, splenectomy) AND [3] NOT taking antiviral medication    Negative: Shingles rash on the eyelid or tip of the nose    Negative: [1] Shingles rash of face AND [2] eye pain or blurred vision    Negative: [1] Shingles rash of face AND [2] facial weakness    Negative: [1] Shingles rash of face or ear AND [2] earache or ringing in the ear    Protocols used: SHINGLES-ADULT-    "

## 2019-04-13 NOTE — PATIENT INSTRUCTIONS
Patient Education     Chickenpox (Adult)  Chickenpox is a very contagious illness caused by a virus. Symptoms include fever and an itchy red rash of small blisters that form all over the body. Blisters may appear on the scalp, face, and in the mouth. Women may also get blisters in the vaginal area. New blisters will appear over the first several days. The contagious period ends when all blisters have crusted over. This is usually about 1 week after the illness begins.  Most children older than 1 year and adults who have never had chickenpox can be vaccinated to prevent this illness.  The illness usually goes away in a few days, but the virus that causes chickenpox remains in the body. Many years or decades later this can result in shingles.   Home care  Follow these guidelines when caring for yourself at home:    You may use acetaminophen or ibuprofen to control pain and fever, unless another medicine was prescribed. If you have chronic liver or kidney disease, talk with your healthcare provider before using these medicines. Also talk with your provider if you ve had a stomach ulcer or GI bleeding. Don t give aspirin to anyone younger age 18 who is ill with a fever. It may cause severe liver damage.    You can relieve itching and pain by mixing cool water with cornstarch, baking soda, and commercial oatmeal bath powder. The oatmeal bath powder is available without a prescription. Use this mixture as a compress for the area. This will soothe the skin. Calamine or another anti-itch lotion may help.    Diphenhydramine is an antihistamine available at grocery stores and pharmacies. You can use this medicine to ease itching over large areas of skin unless a prescription antihistamine was given. Use lower doses during the day and higher doses at bedtime. This is because the drug may make you sleepy. Loratidine, cetirizine, or fexofenadine are other antihistamines that you may use. They cause less drowsiness and are good  choices for daytime use.    Bathe daily. Wash the rash with soap to stop infection.  Follow-up care  Follow up with your healthcare provider, or as advised, if the above tips don t bring relief.  When to seek medical advice  Call your healthcare provider right away if any of these occur:    Signs of skin infection. These include colored drainage from the sores and redness or tenderness of the sores that gets worse.    Excessive vomiting    Severe headache, stiff neck, or confusion    Joint pain, redness, or swelling    Dark urine, or yellow skin or eyes    Leg weakness or trouble walking    Fever of 100.4 F (38 C) or higher that doesn t get better with fever medicine      Date Last Reviewed: 1/1/2017 2000-2018 [employer]. [address] [place] [address], [place] [address]. All rights reserved. This information is not intended as a substitute for professional medical care. Always follow your healthcare professional's instructions.

## 2019-04-13 NOTE — PROGRESS NOTES
"SUBJECTIVE:   Laurie Diaz is a 38 year old female presenting with a chief complaint of having some concerns about being pregnant and girish  provider has shingles.  Exposure was today  Severity mild  Current and Associated symptoms: none  Treatment measures tried include none.  Predisposing factors include patient is 13 weeks pregnant.    Past Medical History:   Diagnosis Date     Anxiety      Hypothyroidism      Paresthesia      ALLERGIES   No Known Allergies    Family History   Problem Relation Age of Onset     Hypothyroidism Mother      Thyroid Disease Mother      Hypertension Father      Heart Disease Father         heart attack     Diabetes Father      Unknown/Adopted Father        Social History     Tobacco Use     Smoking status: Never Smoker     Smokeless tobacco: Never Used   Substance Use Topics     Alcohol use: No     Alcohol/week: 0.0 oz     Frequency: Never     Comment: infrequent       ROS:  CONSTITUTIONAL:NEGATIVE for fever, chills, change in weight  INTEGUMENTARY/SKIN: POSITIVE for eczema on hands  EYES: NEGATIVE for vision changes or irritation  ENT/MOUTH: NEGATIVE for ear, mouth and throat problems  RESP:NEGATIVE for significant cough or SOB  CV: NEGATIVE for chest pain, palpitations or peripheral edema  GI: NEGATIVE for nausea, abdominal pain, heartburn, or change in bowel habits  : normal menstrual cycles  MUSCULOSKELETAL: NEGATIVE for significant arthralgias or myalgia  NEURO: NEGATIVE for weakness, dizziness or paresthesias    OBJECTIVE  :/72 (BP Location: Right arm, Patient Position: Left side, Cuff Size: Adult Regular)   Pulse 79   Temp 98.8  F (37.1  C) (Oral)   Resp 16   Ht 1.651 m (5' 5\")   Wt 78 kg (172 lb)   LMP 01/10/2019 (Exact Date)   SpO2 100%   BMI 28.62 kg/m    GENERAL APPEARANCE: healthy, alert and no distress  EYES: EOMI,  PERRL, conjunctiva clear  HENT: ear canals and TM's normal.  Nose and mouth without ulcers, erythema or lesions  NECK: supple, " nontender, no lymphadenopathy  RESP: lungs clear to auscultation - no rales, rhonchi or wheezes  CV: regular rates and rhythm, normal S1 S2, no murmur noted  ABDOMEN:  soft, nontender, no HSM or masses and bowel sounds normal  NEURO: Normal strength and tone, sensory exam grossly normal,  normal speech and mentation  SKIN: no suspicious lesions or rashes    ASSESSMENT/PLAN      ICD-10-CM    1. Immunity status testing Z01.84 Varicella Zoster Virus Antibody IgG     Parvovirus B19 antibodies IgG IgM   2. Exposure to varicella zoster virus (VZV) Z20.820        Orders Placed This Encounter     Varicella Zoster Virus Antibody IgG     Parvovirus B19 antibodies IgG IgM     Testing for immunity  Child will stay out of  and away from provider with shingles  Discussion of shingles, chicken pox and parvo-virus discussed with patient  Follow up with OB as needed

## 2019-04-14 LAB — VZV IGG SER QL IA: 1.2 AI (ref 0–0.8)

## 2019-04-15 LAB
B19V IGG SER IA-ACNC: 6.53 IV
B19V IGM SER IA-ACNC: 0.21 IV

## 2019-04-16 ENCOUNTER — TELEPHONE (OUTPATIENT)
Dept: OBGYN | Facility: CLINIC | Age: 39
End: 2019-04-16

## 2019-04-16 NOTE — TELEPHONE ENCOUNTER
Laurie who is 13 wks GA calling about concerns that her child's  worker tested positive for shingles and is there any cause for concern. She had chicken pox and her child was vaccinated against chicken pox.    Per CDC explained:Shingles cannot be passed from one person to another. However, the virus that causes shingles, the varicella zoster virus, can spread from a person with active shingles to cause chickenpox in someone who had never had chickenpox  or received chickenpox vaccine.   Pt indicated understanding and agreed with plan.

## 2019-04-22 ENCOUNTER — MYC REFILL (OUTPATIENT)
Dept: OBGYN | Facility: CLINIC | Age: 39
End: 2019-04-22

## 2019-04-22 DIAGNOSIS — Z34.90 PRENATAL CARE: Primary | ICD-10-CM

## 2019-04-22 DIAGNOSIS — E03.9 ACQUIRED HYPOTHYROIDISM: ICD-10-CM

## 2019-04-22 RX ORDER — LEVOTHYROXINE SODIUM 50 UG/1
50 TABLET ORAL DAILY
Qty: 30 TABLET | Refills: 1 | Status: CANCELLED | OUTPATIENT
Start: 2019-04-22

## 2019-04-22 NOTE — TELEPHONE ENCOUNTER
Received a refill request for levothyroxine. Laurie coming to clinic next week and per Dr Jewell's note from 3/26/19, plan is to repeat TSH around 15 wks GA. Her levothyroxine was increased to 50 mcg .  Attempted to reach Laurie but voice mailbox full so will send mychart message that she can refill next week pending plan to check her TSH.

## 2019-04-23 DIAGNOSIS — Z34.90 PRENATAL CARE: ICD-10-CM

## 2019-04-23 DIAGNOSIS — E03.9 ACQUIRED HYPOTHYROIDISM: ICD-10-CM

## 2019-04-23 PROCEDURE — 36415 COLL VENOUS BLD VENIPUNCTURE: CPT | Performed by: FAMILY MEDICINE

## 2019-04-23 PROCEDURE — 84443 ASSAY THYROID STIM HORMONE: CPT | Performed by: FAMILY MEDICINE

## 2019-04-24 LAB — TSH SERPL DL<=0.005 MIU/L-ACNC: 1.41 MU/L (ref 0.4–4)

## 2019-04-26 ENCOUNTER — TELEPHONE (OUTPATIENT)
Dept: OBGYN | Facility: CLINIC | Age: 39
End: 2019-04-26

## 2019-04-26 DIAGNOSIS — E03.9 ACQUIRED HYPOTHYROIDISM: ICD-10-CM

## 2019-04-26 RX ORDER — LEVOTHYROXINE SODIUM 50 UG/1
50 TABLET ORAL DAILY
Qty: 90 TABLET | Refills: 0 | Status: SHIPPED | OUTPATIENT
Start: 2019-04-26 | End: 2019-07-25

## 2019-04-26 NOTE — TELEPHONE ENCOUNTER
Patient called to discuss TSH result and refill of levothyroxine.     Informed patient of result and discussed with Dr. Forest Heart in Dr. Jewell's absence.     Dr. Forest Heart recommends stay on current dose. Pt has IGNACIO f/u in clinic next week. Sending 90 day supply of levothyroxine to pt preferred pharmacy.

## 2019-05-02 ASSESSMENT — ANXIETY QUESTIONNAIRES
GAD7 TOTAL SCORE: 1
2. NOT BEING ABLE TO STOP OR CONTROL WORRYING: NOT AT ALL
4. TROUBLE RELAXING: NOT AT ALL
1. FEELING NERVOUS, ANXIOUS, OR ON EDGE: SEVERAL DAYS
5. BEING SO RESTLESS THAT IT IS HARD TO SIT STILL: NOT AT ALL
7. FEELING AFRAID AS IF SOMETHING AWFUL MIGHT HAPPEN: NOT AT ALL
6. BECOMING EASILY ANNOYED OR IRRITABLE: NOT AT ALL
GAD7 TOTAL SCORE: 1
7. FEELING AFRAID AS IF SOMETHING AWFUL MIGHT HAPPEN: NOT AT ALL
3. WORRYING TOO MUCH ABOUT DIFFERENT THINGS: NOT AT ALL

## 2019-05-03 ENCOUNTER — TELEPHONE (OUTPATIENT)
Dept: OBGYN | Facility: CLINIC | Age: 39
End: 2019-05-03

## 2019-05-03 ASSESSMENT — ANXIETY QUESTIONNAIRES: GAD7 TOTAL SCORE: 1

## 2019-05-03 NOTE — TELEPHONE ENCOUNTER
Spoke with Laurie who is 16 weeks pregnant and having episodes of rectal bleeding. She states that she has been having this even before she was pregnant. She was supposed to have a colonoscopy but then found out she was pregnant. She was told to hold off on the colonoscopy until after pregnancy. She states that the past couple days the bleeding has increased and she is in pain but denies constipation. She is wondering if she needs to see colo/rectal or what she should do because she is very concerned. Nurse discussed with Dr. Miller in clinic who suggested she try the rectal suppositories. If no improvement then will need clinic evaluation. Patient agreeable to try the suppositories.

## 2019-05-06 ENCOUNTER — OFFICE VISIT (OUTPATIENT)
Dept: OBGYN | Facility: CLINIC | Age: 39
End: 2019-05-06
Attending: ADVANCED PRACTICE MIDWIFE
Payer: COMMERCIAL

## 2019-05-06 VITALS
BODY MASS INDEX: 28.29 KG/M2 | HEART RATE: 85 BPM | WEIGHT: 170 LBS | SYSTOLIC BLOOD PRESSURE: 125 MMHG | DIASTOLIC BLOOD PRESSURE: 80 MMHG

## 2019-05-06 DIAGNOSIS — O09.90 HIGH RISK PREGNANCY, ANTEPARTUM: Primary | ICD-10-CM

## 2019-05-06 DIAGNOSIS — E03.9 ACQUIRED HYPOTHYROIDISM: ICD-10-CM

## 2019-05-06 DIAGNOSIS — Z13.79 GENETIC SCREENING: ICD-10-CM

## 2019-05-06 DIAGNOSIS — O09.522 ELDERLY MULTIGRAVIDA IN SECOND TRIMESTER: ICD-10-CM

## 2019-05-06 DIAGNOSIS — F41.9 ANXIETY: ICD-10-CM

## 2019-05-06 DIAGNOSIS — K62.5 RECTAL BLEEDING: ICD-10-CM

## 2019-05-06 PROBLEM — Z23 NEED FOR TDAP VACCINATION: Status: RESOLVED | Noted: 2018-09-19 | Resolved: 2019-05-06

## 2019-05-06 PROCEDURE — 36415 COLL VENOUS BLD VENIPUNCTURE: CPT | Performed by: ADVANCED PRACTICE MIDWIFE

## 2019-05-06 PROCEDURE — G0463 HOSPITAL OUTPT CLINIC VISIT: HCPCS | Mod: ZF

## 2019-05-06 PROCEDURE — 82105 ALPHA-FETOPROTEIN SERUM: CPT | Performed by: ADVANCED PRACTICE MIDWIFE

## 2019-05-06 ASSESSMENT — PAIN SCALES - GENERAL: PAINLEVEL: NO PAIN (0)

## 2019-05-06 NOTE — LETTER
2019     RE: Laurie Diaz  [address] [facility] [address]     Dear Colleague,    Thank you for referring your patient, Laurie Diaz, to the [facility] at [facility]. Please see a copy of my visit note below.    Subjective:      38 year old  at 16w4d presents for a routine prenatal appointment.   no vaginal bleeding or leakage of fluid.  no contractions or cramping.    no fetal movement yet c/w gestational age. No HA, visual changes, RUQ or epigastric pain.     Level II US  Scheduled.   Offered MSAFP, agrees  TSH normal end of April. Plan recheck 3rd trimester    The patient presents with the following concerns:   - ~6 months of rectal pain/pressure with some rectal bleeding. Was going to get a colonoscopy but then found out she was pregnant.  Open to a GI referral.  Will call and if any issues getting scheduled will let CNM team know.   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 Has had some cramping past couple days. No bleeding.  No s/s of UTI  - [place] around [place] of [place] vs [place],  Sunscreen,  camping safety  - Using hydrocortinsone for rashes. Dry skin on hands from washing  - weight gain/growth in pregnancy - feels like she's showing much more.       Objective:  Vitals:    19 1516   BP: 125/80   Pulse: 85   Weight: 77.1 kg (170 lb)     See OB flowsheet    Assessment/Plan     Encounter Diagnoses   Name Primary?     High risk pregnancy, antepartum - [facility] CNM Yes     Elderly multigravida in second trimester      Genetic screening      Rectal bleeding      Acquired hypothyroidism      Anxiety      Orders Placed This Encounter   Procedures     AFP - Alpha Fetoprotein Maternal Screen     GASTROENTEROLOGY ADULT REF CONSULT ONLY       - Reviewed total weight gain, encouraged continued healthy diet and exercise.      - Reviewed why/how to  contact provider.   -  GI referral.  Will call and if any issues getting scheduled will let CNM team know.  Reviewed colonoscopy safety has risk/benefit and GI team will help with plan of care recommendations.   - Reviewed functional ovarian cysts will be reviewed on routine US.  Reviewed s/s of degradation and torsion and how/why to contact CNM prn.   - Reviewed sunscreen, chemical/bacteria expsosure(open wounds as risk factor), bug spray, emergency services.   Has many questions   - Reinforced emollient rich hand cream/moisturzier. Oatmeal paste for itching. Dermatology referral prn, pt declined today.     Patient education/orders or handouts today:PTL signs/symptoms, AFP only, Fetal movement and Level 2 u/s scheduled      Return to clinic in 4 weeks and prn if questions or concerns.   Danitza Pierce, NIRAJ CNM

## 2019-05-06 NOTE — LETTER
May 6, 2019    To Whom It May Concern:    Laurie Diaz is being seen in our clinic for prenatal care.      Her Estimated Date of Delivery: Oct 17, 2019.    The first day the third trimester:7/25/2019  LMP:  Patient's last menstrual period was 01/10/2019 (exact date).     Sincerely,        Danitza Pierce MD

## 2019-05-06 NOTE — LETTER
May 6, 2019    To Whom It May Concern:    Laurie Diaz is being seen in our clinic for prenatal care.      Her Estimated Date of Delivery: Oct 17, 2019.    The first day the third trimester:7/25/2019    LMP:  Patient's last menstrual period was 01/10/2019 (exact date).     Sincerely,        NIRAJ Gamez CNM

## 2019-05-06 NOTE — PROGRESS NOTES
Subjective:      38 year old  at 16w4d presents for a routine prenatal appointment.   no vaginal bleeding or leakage of fluid.  no contractions or cramping.    no fetal movement yet c/w gestational age. No HA, visual changes, RUQ or epigastric pain.     Level II US  Scheduled.   Offered MSAFP, agrees  TSH normal end of April. Plan recheck 3rd trimester    The patient presents with the following concerns:   - ~6 months of rectal pain/pressure with some rectal bleeding. Was going to get a colonoscopy but then found out she was pregnant.  Open to a GI referral.  Will call and if any issues getting scheduled will let CNM team know.   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 Has had some cramping past couple days. No bleeding.  No s/s of UTI  - Questions around safety of lake vs pool,  Sunscreen,  camping safety  - Using hydrocortinsone for rashes. Dry skin on hands from washing  - weight gain/growth in pregnancy - feels like she's showing much more.       Objective:  Vitals:    19 1516   BP: 125/80   Pulse: 85   Weight: 77.1 kg (170 lb)     See OB flowsheet    Assessment/Plan     Encounter Diagnoses   Name Primary?     High risk pregnancy, antepartum - [facility] CNM Yes     Elderly multigravida in second trimester      Genetic screening      Rectal bleeding      Acquired hypothyroidism      Anxiety      Orders Placed This Encounter   Procedures     AFP - Alpha Fetoprotein Maternal Screen     GASTROENTEROLOGY ADULT REF CONSULT ONLY       - Reviewed total weight gain, encouraged continued healthy diet and exercise.      - Reviewed why/how to contact provider.   -  GI referral.  Will call and if any issues getting scheduled will let CNM team know.  Reviewed colonoscopy safety has risk/benefit and GI team will help with plan of care recommendations.   - Reviewed functional ovarian cysts will be reviewed on routine US.   Reviewed s/s of degradation and torsion and how/why to contact CNM prn.   - Reviewed sunscreen, chemical/bacteria expsosure(open wounds as risk factor), bug spray, emergency services.   Has many questions   - Reinforced emollient rich hand cream/moisturzier. Oatmeal paste for itching. Dermatology referral prn, pt declined today.     Patient education/orders or handouts today:PTL signs/symptoms, AFP only, Fetal movement and Level 2 u/s scheduled      Return to clinic in 4 weeks and prn if questions or concerns.   Danitza Pierce, NIRAJ CNM

## 2019-05-09 ENCOUNTER — TELEPHONE (OUTPATIENT)
Dept: OBGYN | Facility: CLINIC | Age: 39
End: 2019-05-09

## 2019-05-09 DIAGNOSIS — O09.529 AMA (ADVANCED MATERNAL AGE) MULTIGRAVIDA 35+: Primary | ICD-10-CM

## 2019-05-09 DIAGNOSIS — O09.529 AMA (ADVANCED MATERNAL AGE) MULTIGRAVIDA 35+: ICD-10-CM

## 2019-05-09 LAB
# FETUSES US: NORMAL
# FETUSES: NORMAL
AFP ADJ MOM AMN: 0.8
AFP SERPL-MCNC: 26 NG/ML
AGE - REPORTED: 36.9 YR
CURRENT SMOKER: NO
CURRENT SMOKER: NO
DIABETES STATUS PATIENT: NO
FAMILY MEMBER DISEASES HX: NO
FAMILY MEMBER DISEASES HX: NO
GA METHOD: NO
GA METHOD: NORMAL
GA: NORMAL WK
IDDM PATIENT QL: NO
INTEGRATED SCN PATIENT-IMP: NORMAL
LMP START DATE: NORMAL
MONOCHORIONIC TWINS: NO
SERVICE CMNT-IMP: NO
SPECIMEN DRAWN SERPL: NORMAL
VALPROIC/CARBAMAZEPINE STATUS: NO
WEIGHT UNITS: NORMAL

## 2019-05-09 PROCEDURE — 86645 CMV ANTIBODY IGM: CPT | Performed by: MIDWIFE

## 2019-05-09 PROCEDURE — 36415 COLL VENOUS BLD VENIPUNCTURE: CPT | Performed by: MIDWIFE

## 2019-05-09 NOTE — TELEPHONE ENCOUNTER
Laurie  at 17 wks calling to get tested for CMV. 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 Willing to pay out of pocket if insurance doesn't cover.    Spoke to on call Rupal MORENO and she is fine with ordering CMV antibody lab test.    Informed Laurie lab test ordered ands she can go to outpt lab at her convenience.Pt indicated understanding and agreed with plan.

## 2019-05-11 LAB — CMV IGM SERPL QL IA: <0.2 AI (ref 0–0.8)

## 2019-05-17 ENCOUNTER — PRE VISIT (OUTPATIENT)
Dept: MATERNAL FETAL MEDICINE | Facility: CLINIC | Age: 39
End: 2019-05-17

## 2019-05-20 NOTE — TELEPHONE ENCOUNTER
FUTURE VISIT INFORMATION      FUTURE VISIT INFORMATION:    Date: 6/19/19    Time: 11AM    Location: [facility]  REFERRAL INFORMATION:    Referring provider:  Danitza Pierce CNM    Referring providers clinic:  [facility]    Reason for visit/diagnosis:  Rectal bleeding     NOTES STATUS DETAILS   OFFICE NOTE from referring provider  Internal 5/6/19   OFFICE NOTE from other specialist   Internal 2/22/19, 2/1/19   DISCHARGE SUMMARY FROM HOSPITAL N/A    DISCHARGE REPORT FROM ED N/A    OPERATIVE REPORT  N/A    PFC REPORT N/A    MEDICATION LIST Internal    LABS     FIT/STOOL TESTING N/A    ANAL PAP N/A    PATHOLOGY REPORTS RELATED TO DIAGNOSIS N/A    DIAGNOSTIC PROCEDURES     COLONOSCOPY N/A    ENDOSCOPY (EGD) N/A    ERCP N/A    ANOSCOPY N/A    FLEX SIGMOIDOSCOPY N/A    IMAGING & REPORT      CT, MRI, US, XR Received

## 2019-05-24 ENCOUNTER — HOSPITAL ENCOUNTER (OUTPATIENT)
Dept: ULTRASOUND IMAGING | Facility: CLINIC | Age: 39
Discharge: HOME | End: 2019-05-24
Attending: OBSTETRICS & GYNECOLOGY | Admitting: OBSTETRICS & GYNECOLOGY
Payer: COMMERCIAL

## 2019-05-24 ENCOUNTER — OFFICE VISIT (OUTPATIENT)
Dept: MATERNAL FETAL MEDICINE | Facility: CLINIC | Age: 39
End: 2019-05-24
Attending: OBSTETRICS & GYNECOLOGY
Payer: COMMERCIAL

## 2019-05-24 DIAGNOSIS — O43.192 MARGINAL INSERTION OF UMBILICAL CORD AFFECTING MANAGEMENT OF MOTHER IN SECOND TRIMESTER: ICD-10-CM

## 2019-05-24 DIAGNOSIS — O09.522 AMA (ADVANCED MATERNAL AGE) MULTIGRAVIDA 35+, SECOND TRIMESTER: Primary | ICD-10-CM

## 2019-05-24 DIAGNOSIS — O26.90 PREGNANCY RELATED CONDITION, ANTEPARTUM: ICD-10-CM

## 2019-05-24 PROCEDURE — 96040 ZZH GENETIC COUNSELING, EACH 30 MINUTES: CPT | Mod: ZF | Performed by: GENETIC COUNSELOR, MS

## 2019-05-24 PROCEDURE — 76811 OB US DETAILED SNGL FETUS: CPT

## 2019-05-24 NOTE — PROGRESS NOTES
[facility]  Genetic Counseling Consult    Patient:  Laurie Diaz YOB: 1980   Date of Service:  19      Laurie Diaz was seen at the [facility] for genetic consultation as part of her appointment for comprehensive ultrasound.  The indication for genetic counseling is advanced maternal age. She was accompanied by her , Bienvenido.        Impression/Plan:   1. Laurie had a cell-free fetal DNA test earlier in pregnancy, which was normal.    2. Laurie had a comprehensive (level II) ultrasound today.  Please see the ultrasound report for further details.    3. The patient declines genetic amniocentesis and additional maternal serum screening today.    Pregnancy History:   /Parity:     Age at Delivery: 38 year old  NAKUL: 10/17/2019, by Last Menstrual Period  Gestational Age: 19w1d    No significant complications or exposures were reported in the current pregnancy.    She is taking levothyroxine for treatment of hypothyroidism  o Levothyroxine is a medication typically used to treat hypothyroidism. There have been no reports of major birth defects or miscarriages with the use of levothyroxine in pregnant women. The fetal risk is minimal. There are risks to pregnancy of untreated hypothyroidism including spontaneous , gestational hypertension, preeclampsia, stillbirth, and premature delivery. Multiple organizations including the World Health Organization considers maternal levothyroxine use compatible with breastfeeding.       Laurie mosley pregnancy history is significant for one 8 week miscarriage and one full term pregnancy, healthy daughter.    Medical History:   Laurie mosley reported medical history is not expected to impact pregnancy management or risks to fetal development.       Family History:   A three-generation pedigree was obtained, and is scanned under the  Media  tab.   The following significant findings  were reported by Laurie:    The father of the pregnancy, Bienvenido, 38, is healthy      Laurie's sister has been trying to conceive for six years. She has no history of miscarriages. Her sister's physicians recommended she adopt a more healthy lifestyle before trying again. Laurie does not think her sister has had an extensive work-up or any genetic testing      Laurie reports a maternal male cousin with intellectual disability as well as physical disabilities. She is unsure of further medical information or if any diagnosis was made or testing has been done. The remaining family history is negative for intellectual disability.     Bienvenido also reports his maternal half-brother has intellectual disability. He has few details on his health.    We discussed that intellectual disability can have numerous genetics and environmental causes. Many genetic causes are sporadic but there can be inherited factors in some cases. Without more information it is difficult to fully assess the risk the family history poses to the pregnancy but it is likely low. If more information is collected regarding this family history it would be reasonable to revisit it for a more accurate risk assessment.      Bienvenido reports his father is deaf due to childhood infections and illness in [place]. Bienvenido also believes his mother had intellectual disability but reports she also had a very difficult childhood and adulthood in rural [facility].     Otherwise, the reported family history is negative for multiple miscarriages, stillbirths, birth defects, mental retardation, known genetic conditions, and consanguinity.       Carrier Screening:   The patient reports that she and the father of the pregnancy have  ancestry:      Cystic fibrosis is an autosomal recessive genetic condition that occurs with increased frequency in individuals of  ancestry and carrier screening for this condition is available.  In addition,  screening in the  [facility] includes cystic fibrosis.        Expanded carrier screening for mutations in a large panel of genes associated with autosomal recessive conditions including cystic fibrosis, spinal muscular atrophy, and others, is now available.      Carrier screening was not discussed today.       Risk Assessment for Chromosome Conditions:   We explained that the risk for fetal chromosome abnormalities increases with maternal age. We discussed specific features of common chromosome abnormalities, including Down syndrome, trisomy 13, trisomy 18, and sex chromosome trisomies.      At age 38 at midtrimester, the risk to have a baby with Down syndrome is 1 in 216.     At age 38 at midtrimester, the risk to have a baby with any chromosome abnormality is 1 in 105.       Laurie had maternal serum screening earlier in pregnancy.     Non-invasive Prenatal Testing (NIPT)    Maternal plasma cell-free DNA testing    Screens for fetal trisomy 21, trisomy 13, trisomy 18, and sex chromosome aneuploidy    First trimester ultrasound with nuchal translucency and nasal bone assessment was not performed in this pregnancy, to our knowledge.    Laurie had a Innatal test earlier in pregnancy; we reviewed the results today, which are normal for chromosome 13, chromosome 18 and chromosome 21 (no aneuploidy detected)    Given the accuracy of this test, these results greatly decrease the chance for certain fetal chromosome abnormalities    We discussed the limitations of normal NIPT results    MSAFP (after 15 weeks for open neural tube defect screening) results were within normal limits, which decreased the risk of an open neural tube defect in the pregnancy to 1 in >10,000.         Testing Options:   We discussed the following options:   Genetic Amniocentesis    Invasive procedure typically performed in the second trimester by which amniotic fluid is obtained for the purpose of chromosome analysis and/or other prenatal genetic  analysis    Diagnostic results; >99% sensitivity for fetal chromosome abnormalities    AFAFP measurement tests for open neural tube defects     Comprehensive (Level II) ultrasound: Detailed ultrasound performed between 18-22 weeks gestation to screen for major birth defects and markers for aneuploidy.      We reviewed the benefits and limitations of this testing.  Screening tests provide a risk assessment specific to the pregnancy for certain fetal chromosome abnormalities, but cannot definitively diagnose or exclude a fetal chromosome abnormality.  Follow-up genetic counseling and consideration of diagnostic testing is recommended with any abnormal screening result.     Diagnostic tests carry inherent risks- including risk of miscarriage- that require careful consideration.  These tests can detect fetal chromosome abnormalities with greater than 99% certainty.  Results can be compromised by maternal cell contamination or mosaicism, and are limited by the resolution of cytogenetic G-banding technology.  There is no screening nor diagnostic test that can detect all forms of birth defects or mental disability.    It was a pleasure to be involved with Laurie mosley care. Face-to-face time of the meeting was 35 minutes.    Mary Dorantes MS, [facility]  Licensed Genetic Counselor   [facility]  [email or web address]   Office: [phone] [facility]: [phone]  Pager: [phone] Fax: [phone]

## 2019-05-24 NOTE — PROGRESS NOTES
"Please see \"Imaging\" tab under \"Chart Review\" for details of today's visit.    Edward Christopher    "

## 2019-06-08 ASSESSMENT — ANXIETY QUESTIONNAIRES
6. BECOMING EASILY ANNOYED OR IRRITABLE: NOT AT ALL
3. WORRYING TOO MUCH ABOUT DIFFERENT THINGS: NOT AT ALL
2. NOT BEING ABLE TO STOP OR CONTROL WORRYING: NOT AT ALL
7. FEELING AFRAID AS IF SOMETHING AWFUL MIGHT HAPPEN: NOT AT ALL
GAD7 TOTAL SCORE: 1
GAD7 TOTAL SCORE: 1
7. FEELING AFRAID AS IF SOMETHING AWFUL MIGHT HAPPEN: NOT AT ALL
4. TROUBLE RELAXING: NOT AT ALL
1. FEELING NERVOUS, ANXIOUS, OR ON EDGE: SEVERAL DAYS
5. BEING SO RESTLESS THAT IT IS HARD TO SIT STILL: NOT AT ALL

## 2019-06-09 ASSESSMENT — ANXIETY QUESTIONNAIRES: GAD7 TOTAL SCORE: 1

## 2019-06-11 ENCOUNTER — PRENATAL OFFICE VISIT (OUTPATIENT)
Dept: OBGYN | Facility: CLINIC | Age: 39
End: 2019-06-11
Attending: OBSTETRICS & GYNECOLOGY
Payer: COMMERCIAL

## 2019-06-11 VITALS
SYSTOLIC BLOOD PRESSURE: 114 MMHG | DIASTOLIC BLOOD PRESSURE: 75 MMHG | BODY MASS INDEX: 29.16 KG/M2 | WEIGHT: 175 LBS | HEART RATE: 71 BPM | HEIGHT: 65 IN

## 2019-06-11 DIAGNOSIS — O43.199 MARGINAL INSERTION OF UMBILICAL CORD AFFECTING MANAGEMENT OF MOTHER: ICD-10-CM

## 2019-06-11 DIAGNOSIS — O09.90 HIGH RISK PREGNANCY, ANTEPARTUM: Primary | ICD-10-CM

## 2019-06-11 ASSESSMENT — MIFFLIN-ST. JEOR: SCORE: 1484.67

## 2019-06-12 ENCOUNTER — TRANSCRIBE ORDERS (OUTPATIENT)
Dept: MATERNAL FETAL MEDICINE | Facility: CLINIC | Age: 39
End: 2019-06-12

## 2019-06-12 DIAGNOSIS — O26.90 PREGNANCY RELATED CONDITION, ANTEPARTUM: Primary | ICD-10-CM

## 2019-06-14 PROBLEM — O43.199 MARGINAL INSERTION OF UMBILICAL CORD AFFECTING MANAGEMENT OF MOTHER: Status: ACTIVE | Noted: 2019-06-14

## 2019-06-14 NOTE — PROGRESS NOTES
"Patient overall doing well, has questions regarding marginal cord insertion and follow-up.  Wonders if she can do anything to affect that. Noting fetal movement.  Concerned about history of fast labors.    O:/75   Pulse 71   Ht 1.651 m (5' 5\")   Wt 79.4 kg (175 lb)   LMP 01/10/2019 (Exact Date)   BMI 29.12 kg/m    Gen'l: appears well  See flowsheet    A/P: 37 yo  at 21 w5d  Patient Active Problem List   Diagnosis     Acquired hypothyroidism     Paresthesia     AMA (advanced maternal age) multigravida 35+     Anxiety     High risk pregnancy, antepartum - [facility] CN[facility]     Rectal bleeding - for past 6 months, GI referral     Marginal insertion of umbilical cord affecting management of mother   reassured patient regarding marginal cord insertion, follow-up ultrasound ordered  Discussed possibility of elective induction after 39 weeks given history of fast labor and patient lives over a half hour away.  Plan TSH with 28 week labs  RTC 5 weeks for EOB  Carmen Jewell MD      "

## 2019-06-19 ENCOUNTER — PRE VISIT (OUTPATIENT)
Dept: SURGERY | Facility: CLINIC | Age: 39
End: 2019-06-19

## 2019-07-15 ENCOUNTER — OFFICE VISIT (OUTPATIENT)
Dept: OBGYN | Facility: CLINIC | Age: 39
End: 2019-07-15
Attending: ADVANCED PRACTICE MIDWIFE
Payer: COMMERCIAL

## 2019-07-15 VITALS
SYSTOLIC BLOOD PRESSURE: 114 MMHG | DIASTOLIC BLOOD PRESSURE: 74 MMHG | HEIGHT: 65 IN | WEIGHT: 181.2 LBS | HEART RATE: 78 BPM | BODY MASS INDEX: 30.19 KG/M2

## 2019-07-15 DIAGNOSIS — O09.93 HIGH-RISK PREGNANCY IN THIRD TRIMESTER: Primary | ICD-10-CM

## 2019-07-15 LAB
ERYTHROCYTE [DISTWIDTH] IN BLOOD BY AUTOMATED COUNT: 13 % (ref 10–15)
GLUCOSE 1H P 50 G GLC PO SERPL-MCNC: 123 MG/DL (ref 60–129)
HCT VFR BLD AUTO: 35.7 % (ref 35–47)
HGB BLD-MCNC: 12 G/DL (ref 11.7–15.7)
MCH RBC QN AUTO: 31.7 PG (ref 26.5–33)
MCHC RBC AUTO-ENTMCNC: 33.6 G/DL (ref 31.5–36.5)
MCV RBC AUTO: 94 FL (ref 78–100)
PLATELET # BLD AUTO: 268 10E9/L (ref 150–450)
RBC # BLD AUTO: 3.78 10E12/L (ref 3.8–5.2)
TSH SERPL DL<=0.005 MIU/L-ACNC: 2.14 MU/L (ref 0.4–4)
WBC # BLD AUTO: 12 10E9/L (ref 4–11)

## 2019-07-15 PROCEDURE — 82950 GLUCOSE TEST: CPT | Performed by: ADVANCED PRACTICE MIDWIFE

## 2019-07-15 PROCEDURE — 86645 CMV ANTIBODY IGM: CPT | Performed by: ADVANCED PRACTICE MIDWIFE

## 2019-07-15 PROCEDURE — 36415 COLL VENOUS BLD VENIPUNCTURE: CPT | Performed by: ADVANCED PRACTICE MIDWIFE

## 2019-07-15 PROCEDURE — 84443 ASSAY THYROID STIM HORMONE: CPT | Performed by: ADVANCED PRACTICE MIDWIFE

## 2019-07-15 PROCEDURE — 85027 COMPLETE CBC AUTOMATED: CPT | Performed by: ADVANCED PRACTICE MIDWIFE

## 2019-07-15 PROCEDURE — 86644 CMV ANTIBODY: CPT | Performed by: ADVANCED PRACTICE MIDWIFE

## 2019-07-15 PROCEDURE — G0463 HOSPITAL OUTPT CLINIC VISIT: HCPCS | Mod: ZF

## 2019-07-15 PROCEDURE — 86780 TREPONEMA PALLIDUM: CPT | Performed by: ADVANCED PRACTICE MIDWIFE

## 2019-07-15 PROCEDURE — 82306 VITAMIN D 25 HYDROXY: CPT | Performed by: ADVANCED PRACTICE MIDWIFE

## 2019-07-15 ASSESSMENT — PAIN SCALES - GENERAL: PAINLEVEL: NO PAIN (0)

## 2019-07-15 ASSESSMENT — ANXIETY QUESTIONNAIRES
GAD7 TOTAL SCORE: 5
5. BEING SO RESTLESS THAT IT IS HARD TO SIT STILL: NOT AT ALL
7. FEELING AFRAID AS IF SOMETHING AWFUL MIGHT HAPPEN: SEVERAL DAYS
6. BECOMING EASILY ANNOYED OR IRRITABLE: NOT AT ALL
3. WORRYING TOO MUCH ABOUT DIFFERENT THINGS: SEVERAL DAYS
1. FEELING NERVOUS, ANXIOUS, OR ON EDGE: SEVERAL DAYS
2. NOT BEING ABLE TO STOP OR CONTROL WORRYING: SEVERAL DAYS

## 2019-07-15 ASSESSMENT — PATIENT HEALTH QUESTIONNAIRE - PHQ9
5. POOR APPETITE OR OVEREATING: SEVERAL DAYS
SUM OF ALL RESPONSES TO PHQ QUESTIONS 1-9: 1

## 2019-07-15 ASSESSMENT — MIFFLIN-ST. JEOR: SCORE: 1512.8

## 2019-07-15 NOTE — LETTER
7/15/2019       RE: Laurie Diaz  [address] [address]     Dear Colleague,    Thank you for referring your patient, Laurie Diaz, to the [facility] at [facility]. Please see a copy of my visit note below.     38 year old, , 26w4d,   The patient presents with the following concerns: Patient worried about CMV.  Reports that a neighbor tested positive, that child has been in contact with the patient and her daughter.  Would like CMV IgG and IgM today.  Continues to have pelvic floor pain rectal and vulvar, discussed PTOSI evaluation.    PHQ-9 SCORE 2016 2016 3/26/2019   PHQ-9 Total Score 2 2 5     Education completed today includes breast feeding, [facility] hand out , contraception, counting movements, signs of pre-term labor, when to present to birthplace, post partum depression, GBS, getting enough iron and labor induction.  Birth preferences reviewed: Un-Medicated  Labor support:      Feeding plans :    Contraception planned:  Unsure options reviewed  The following labs were ordered today:    GCT, CBC w platelets, Vitamin d, Anti-treponema, TSH and CMV IgG and IgM   Blood type:   ABO   Date Value Ref Range Status   2019 O  Final     RH(D)   Date Value Ref Range Status   2019 Pos  Final     Antibody Screen   Date Value Ref Range Status   2019 Neg  Final     TDAP: to be given at next visit  A/P:  Encounter Diagnosis   Name Primary?     High-risk pregnancy in third trimester Yes     Orders Placed This Encounter   Procedures     25- OH-Vitamin D     Glucose 1 Hour     CBC with platelets     Treponema Abs w Reflex to RPR and Titer     TSH with free T4 reflex     CMV Antibody IgG     CMV antibody IgM       Continue scheduled prenatal care  NIRAJ Thomson CNM

## 2019-07-15 NOTE — PROGRESS NOTES
38 year old, , 26w4d,   The patient presents with the following concerns: Patient worried about CMV.  Reports that a neighbor tested positive, that child has been in contact with the patient and her daughter.  Would like CMV IgG and IgM today.  Continues to have pelvic floor pain rectal and vulvar, discussed PTOSI evaluation.    PHQ-9 SCORE 2016 2016 3/26/2019   PHQ-9 Total Score 2 2 5     Education completed today includes breast feeding, [facility] hand out , contraception, counting movements, signs of pre-term labor, when to present to birthplace, post partum depression, GBS, getting enough iron and labor induction.  Birth preferences reviewed: Un-Medicated  Labor support:      Feeding plans :    Contraception planned:  Unsure options reviewed  The following labs were ordered today:    GCT, CBC w platelets, Vitamin d, Anti-treponema, TSH and CMV IgG and IgM   Blood type:   ABO   Date Value Ref Range Status   2019 O  Final     RH(D)   Date Value Ref Range Status   2019 Pos  Final     Antibody Screen   Date Value Ref Range Status   2019 Neg  Final     TDAP: to be given at next visit  A/P:  Encounter Diagnosis   Name Primary?     High-risk pregnancy in third trimester Yes     Orders Placed This Encounter   Procedures     25- OH-Vitamin D     Glucose 1 Hour     CBC with platelets     Treponema Abs w Reflex to RPR and Titer     TSH with free T4 reflex     CMV Antibody IgG     CMV antibody IgM       Continue scheduled prenatal care  NIRAJ Thomson CNM

## 2019-07-16 LAB
CMV IGG SERPL QL IA: <0.2 AI (ref 0–0.8)
CMV IGM SERPL QL IA: <0.2 AI (ref 0–0.8)
DEPRECATED CALCIDIOL+CALCIFEROL SERPL-MC: 36 UG/L (ref 20–75)
T PALLIDUM AB SER QL: NONREACTIVE

## 2019-07-16 ASSESSMENT — ANXIETY QUESTIONNAIRES: GAD7 TOTAL SCORE: 5

## 2019-07-25 ENCOUNTER — TELEPHONE (OUTPATIENT)
Dept: OBGYN | Facility: CLINIC | Age: 39
End: 2019-07-25

## 2019-07-25 DIAGNOSIS — E03.9 ACQUIRED HYPOTHYROIDISM: ICD-10-CM

## 2019-07-25 RX ORDER — LEVOTHYROXINE SODIUM 50 UG/1
50 TABLET ORAL DAILY
Qty: 90 TABLET | Refills: 0 | Status: ON HOLD | OUTPATIENT
Start: 2019-07-25 | End: 2019-10-10

## 2019-07-25 NOTE — TELEPHONE ENCOUNTER
Received refill request for levothyroxine.  Last in clinic July 2019 for prenatal care as patient is pregnant. Recent TSH with no dose change indicated. Refill sent and patient notified by phone.

## 2019-07-30 ENCOUNTER — HOSPITAL ENCOUNTER (OUTPATIENT)
Dept: ULTRASOUND IMAGING | Facility: CLINIC | Age: 39
Discharge: HOME | End: 2019-07-30
Attending: OBSTETRICS & GYNECOLOGY | Admitting: OBSTETRICS & GYNECOLOGY
Payer: COMMERCIAL

## 2019-07-30 ENCOUNTER — OFFICE VISIT (OUTPATIENT)
Dept: MATERNAL FETAL MEDICINE | Facility: CLINIC | Age: 39
End: 2019-07-30
Attending: OBSTETRICS & GYNECOLOGY
Payer: COMMERCIAL

## 2019-07-30 DIAGNOSIS — O26.90 PREGNANCY RELATED CONDITION, ANTEPARTUM: ICD-10-CM

## 2019-07-30 DIAGNOSIS — O43.193 MARGINAL INSERTION OF UMBILICAL CORD AFFECTING MANAGEMENT OF MOTHER IN THIRD TRIMESTER: Primary | ICD-10-CM

## 2019-07-30 PROCEDURE — 76816 OB US FOLLOW-UP PER FETUS: CPT

## 2019-07-30 NOTE — PROGRESS NOTES
Please see ultrasound report under imaging tab for details on ultrasound performed today.    Maddie Gauthier MD  , OB/GYN  Maternal-Fetal Medicine  [email or web address]  [phone] (Academic office)  [phone] (Pager)

## 2019-08-09 ENCOUNTER — PRENATAL OFFICE VISIT (OUTPATIENT)
Dept: OBGYN | Facility: CLINIC | Age: 39
End: 2019-08-09
Attending: OBSTETRICS & GYNECOLOGY
Payer: COMMERCIAL

## 2019-08-09 VITALS
HEART RATE: 90 BPM | DIASTOLIC BLOOD PRESSURE: 77 MMHG | SYSTOLIC BLOOD PRESSURE: 123 MMHG | WEIGHT: 188 LBS | BODY MASS INDEX: 31.32 KG/M2 | HEIGHT: 65 IN

## 2019-08-09 DIAGNOSIS — O43.199 MARGINAL INSERTION OF UMBILICAL CORD AFFECTING MANAGEMENT OF MOTHER: ICD-10-CM

## 2019-08-09 DIAGNOSIS — O09.90 HIGH RISK PREGNANCY, ANTEPARTUM: Primary | ICD-10-CM

## 2019-08-09 PROCEDURE — 90715 TDAP VACCINE 7 YRS/> IM: CPT | Mod: ZF

## 2019-08-09 PROCEDURE — 90471 IMMUNIZATION ADMIN: CPT | Mod: ZF

## 2019-08-09 PROCEDURE — G0463 HOSPITAL OUTPT CLINIC VISIT: HCPCS | Mod: 25

## 2019-08-09 PROCEDURE — 25000128 H RX IP 250 OP 636: Mod: ZF

## 2019-08-09 ASSESSMENT — PAIN SCALES - GENERAL: PAINLEVEL: NO PAIN (0)

## 2019-08-09 ASSESSMENT — MIFFLIN-ST. JEOR: SCORE: 1543.64

## 2019-08-09 NOTE — PROGRESS NOTES
"Patient overall doing well, anxious about possible exposure to CMV, relieved that serologies were negative. Worried that because TSH elevated in March, risk of retardation.  Her last TSH was normal.  O:/77   Pulse 90   Ht 1.651 m (5' 5\")   Wt 85.3 kg (188 lb)   LMP 01/10/2019 (Exact Date)   Breastfeeding? No   BMI 31.28 kg/m     Gen'l: appears well  See flowsheet     A/P: 39 yo  at 30w1d   Patient Active Problem List   Diagnosis     Acquired hypothyroidism     Paresthesia     AMA (advanced maternal age) multigravida 35+     Anxiety     High risk pregnancy, antepartum      Rectal bleeding - for past 6 months, GI referral     Marginal insertion of umbilical cord - resolved by 28 week scan.     Reassured patient that TSH in March was just above normal limits, likely free T4 was normal (although not checked).  Reviewed with patient no longer marginal insertion, no follow-up needed for this  Discussed possibility of elective induction after 39 weeks given history of fast labor, patient considering awaiting spontaneous labor.  TDaP today  RTC 2-3 weeks for IGNACIO Jewell MD    "

## 2019-08-09 NOTE — NURSING NOTE
Chief Complaint   Patient presents with     Prenatal Care     IGNACIO 30 weeks and 1 day   Bettina Urban LPN

## 2019-08-22 ENCOUNTER — OFFICE VISIT (OUTPATIENT)
Dept: OBGYN | Facility: CLINIC | Age: 39
End: 2019-08-22
Attending: OBSTETRICS & GYNECOLOGY
Payer: COMMERCIAL

## 2019-08-22 VITALS
DIASTOLIC BLOOD PRESSURE: 77 MMHG | HEART RATE: 86 BPM | WEIGHT: 190 LBS | BODY MASS INDEX: 31.62 KG/M2 | SYSTOLIC BLOOD PRESSURE: 120 MMHG

## 2019-08-22 DIAGNOSIS — O09.90 HIGH RISK PREGNANCY, ANTEPARTUM: Primary | ICD-10-CM

## 2019-08-22 ASSESSMENT — PAIN SCALES - GENERAL: PAINLEVEL: NO PAIN (0)

## 2019-08-22 NOTE — NURSING NOTE
Chief Complaint   Patient presents with     Prenatal Care     IGNACIO 32 weeks   Bettina Urban LPN

## 2019-08-22 NOTE — PROGRESS NOTES
Patient doing well, noticed vaginal moisture, clear wonders if urine or amniotic fluid.  No contractions, good fetal movement.    O: /77   Pulse 86   Wt 86.2 kg (190 lb)   LMP 01/10/2019 (Exact Date)   Breastfeeding? No   BMI 31.62 kg/m    GEn'l: well appearing  See flowsheet  Vulva: normal appearing  Vagina: milky discharge present, no pooling, no loss of fluid with valsalva, pH <5    Assessment/Plan:  39 yo  at 32w0d   Patient Active Problem List   Diagnosis     Acquired hypothyroidism     Paresthesia     AMA (advanced maternal age) multigravida 35+     Anxiety     High risk pregnancy, antepartum      Rectal bleeding - resolved, likely secondary to fissure     Marginal insertion of umbilical cord - resolved by 28 week scan.   No evidence of rupture of membranes  RTC 2 weeks.  Carmen Jewell MD     Private Vehicle

## 2019-08-22 NOTE — LETTER
2019       RE: Laurie Diaz  [address] [address]     Dear Colleague,    Thank you for referring your patient, Laurie Diaz, to the [facility] at [facility]. Please see a copy of my visit note below.    Patient doing well, noticed vaginal moisture, clear wonders if urine or amniotic fluid.  No contractions, good fetal movement.    O: /77   Pulse 86   Wt 86.2 kg (190 lb)   LMP 01/10/2019 (Exact Date)   Breastfeeding? No   BMI 31.62 kg/m     GEn'l: well appearing  See flowsheet  Vulva: normal appearing  Vagina: milky discharge present, no pooling, no loss of fluid with valsalva, pH <5    Assessment/Plan:  39 yo  at 32w0d   Patient Active Problem List   Diagnosis     Acquired hypothyroidism     Paresthesia     AMA (advanced maternal age) multigravida 35+     Anxiety     High risk pregnancy, antepartum      Rectal bleeding - resolved, likely secondary to fissure     Marginal insertion of umbilical cord - resolved by 28 week scan.   No evidence of rupture of membranes  RTC 2 weeks.    Carmen Jewell MD

## 2019-09-03 ENCOUNTER — PRENATAL OFFICE VISIT (OUTPATIENT)
Dept: OBGYN | Facility: CLINIC | Age: 39
End: 2019-09-03
Attending: OBSTETRICS & GYNECOLOGY
Payer: COMMERCIAL

## 2019-09-03 VITALS
DIASTOLIC BLOOD PRESSURE: 77 MMHG | BODY MASS INDEX: 32.11 KG/M2 | WEIGHT: 192.7 LBS | HEART RATE: 77 BPM | SYSTOLIC BLOOD PRESSURE: 117 MMHG | HEIGHT: 65 IN

## 2019-09-03 DIAGNOSIS — E03.9 ACQUIRED HYPOTHYROIDISM: ICD-10-CM

## 2019-09-03 DIAGNOSIS — O09.90 HIGH RISK PREGNANCY, ANTEPARTUM: Primary | ICD-10-CM

## 2019-09-03 DIAGNOSIS — O43.199 MARGINAL INSERTION OF UMBILICAL CORD AFFECTING MANAGEMENT OF MOTHER: ICD-10-CM

## 2019-09-03 LAB — TSH SERPL DL<=0.005 MIU/L-ACNC: 2.28 MU/L (ref 0.4–4)

## 2019-09-03 PROCEDURE — 84443 ASSAY THYROID STIM HORMONE: CPT | Performed by: OBSTETRICS & GYNECOLOGY

## 2019-09-03 PROCEDURE — 36415 COLL VENOUS BLD VENIPUNCTURE: CPT | Performed by: OBSTETRICS & GYNECOLOGY

## 2019-09-03 ASSESSMENT — MIFFLIN-ST. JEOR: SCORE: 1564.96

## 2019-09-04 NOTE — PROGRESS NOTES
"Doing well, feels like she \"needs to have something to worry about\" and is concerned about the risk of living near a cell phone tower.  She feels normal fetal movement and denies gushes of fluid.  No vaginal bleeding and no contractions.    O: /77   Pulse 77   Ht 1.651 m (5' 5\")   Wt 87.4 kg (192 lb 11.2 oz)   LMP 01/10/2019 (Exact Date)   BMI 32.07 kg/m     See flow sheet    Assessment/Plan:  37 yo  at 33w5d   Patient Active Problem List   Diagnosis     Acquired hypothyroidism     Paresthesia     AMA (advanced maternal age) multigravida 35+     Anxiety     High risk pregnancy, antepartum      Rectal bleeding - resolved, likely secondary to fissure     Marginal insertion of umbilical cord - resolved by 28 week scan.     - recheck TSH today  - RTC 2 weeks GBS at that visit  Carmen Jewell MD  "

## 2019-09-17 ENCOUNTER — PRENATAL OFFICE VISIT (OUTPATIENT)
Dept: OBGYN | Facility: CLINIC | Age: 39
End: 2019-09-17
Attending: OBSTETRICS & GYNECOLOGY
Payer: COMMERCIAL

## 2019-09-17 VITALS
DIASTOLIC BLOOD PRESSURE: 75 MMHG | BODY MASS INDEX: 32.52 KG/M2 | SYSTOLIC BLOOD PRESSURE: 120 MMHG | HEART RATE: 79 BPM | HEIGHT: 65 IN | WEIGHT: 195.2 LBS

## 2019-09-17 DIAGNOSIS — E03.9 ACQUIRED HYPOTHYROIDISM: ICD-10-CM

## 2019-09-17 DIAGNOSIS — O09.90 HIGH RISK PREGNANCY, ANTEPARTUM: Primary | ICD-10-CM

## 2019-09-17 LAB
ERYTHROCYTE [DISTWIDTH] IN BLOOD BY AUTOMATED COUNT: 13 % (ref 10–15)
HCT VFR BLD AUTO: 36 % (ref 35–47)
HGB BLD-MCNC: 12 G/DL (ref 11.7–15.7)
MCH RBC QN AUTO: 31.9 PG (ref 26.5–33)
MCHC RBC AUTO-ENTMCNC: 33.3 G/DL (ref 31.5–36.5)
MCV RBC AUTO: 96 FL (ref 78–100)
PLATELET # BLD AUTO: 224 10E9/L (ref 150–450)
RBC # BLD AUTO: 3.76 10E12/L (ref 3.8–5.2)
WBC # BLD AUTO: 12.3 10E9/L (ref 4–11)

## 2019-09-17 PROCEDURE — 87653 STREP B DNA AMP PROBE: CPT | Performed by: MIDWIFE

## 2019-09-17 PROCEDURE — 36415 COLL VENOUS BLD VENIPUNCTURE: CPT | Performed by: MIDWIFE

## 2019-09-17 PROCEDURE — 87186 SC STD MICRODIL/AGAR DIL: CPT | Performed by: MIDWIFE

## 2019-09-17 PROCEDURE — G0463 HOSPITAL OUTPT CLINIC VISIT: HCPCS

## 2019-09-17 PROCEDURE — 85027 COMPLETE CBC AUTOMATED: CPT | Performed by: MIDWIFE

## 2019-09-17 ASSESSMENT — MIFFLIN-ST. JEOR: SCORE: 1576.3

## 2019-09-17 NOTE — PROGRESS NOTES
"Subjective:      38 year old  at 35w5d presents for a routine prenatal appointment.         no vaginal bleeding,  leakage of fluid, or change in vaginal discharge.  occ  contractions.  Good  fetal movement.     No HA, visual changes, RUQ or epigastric pain.   Patient concerns: working until delivery?   at [employer] and     Feeling well overall.   Objective:  Vitals:    19 1609   BP: 120/75   Pulse: 79   Weight: 88.5 kg (195 lb 3.2 oz)   Height: 1.651 m (5' 5\")    See OB flowsheet    Assessment/Plan   (O09.90) High risk pregnancy, antepartum   (primary encounter diagnosis)    Plan: Group B strep PCR, CBC with Platelets    (E03.9) Acquired hypothyroidism        Orders Placed This Encounter   Procedures     CBC with Platelets     No orders of the defined types were placed in this encounter.      PHQ-9 SCORE 2016 3/26/2019 7/15/2019   PHQ-9 Total Score 2 5 1     [unfilled]  GBS screening: Obtained.  Labor signs discussed. Reinforced daily fetal movement counts.  Reviewed why/how to contact provider if headache/visual changes/RUQ or epigastric pain, decreased fetal movement, vaginal bleeding, leakage of fluid.   Return to clinic in 1 week and prn if questions or concerns.   CBC today    NIRAJ Powell CNM  "

## 2019-09-18 LAB
GP B STREP DNA SPEC QL NAA+PROBE: POSITIVE
SPECIMEN SOURCE: ABNORMAL

## 2019-09-21 PROBLEM — B95.1 POSITIVE GBS TEST: Status: ACTIVE | Noted: 2019-09-21

## 2019-09-21 LAB
BACTERIA SPEC CULT: ABNORMAL
SPECIMEN SOURCE: ABNORMAL

## 2019-09-26 ENCOUNTER — PRENATAL OFFICE VISIT (OUTPATIENT)
Dept: OBGYN | Facility: CLINIC | Age: 39
End: 2019-09-26
Attending: OBSTETRICS & GYNECOLOGY
Payer: COMMERCIAL

## 2019-09-26 ENCOUNTER — TELEPHONE (OUTPATIENT)
Dept: OBGYN | Facility: CLINIC | Age: 39
End: 2019-09-26

## 2019-09-26 VITALS
DIASTOLIC BLOOD PRESSURE: 80 MMHG | BODY MASS INDEX: 32.28 KG/M2 | HEART RATE: 80 BPM | SYSTOLIC BLOOD PRESSURE: 129 MMHG | WEIGHT: 194 LBS

## 2019-09-26 DIAGNOSIS — O43.199 MARGINAL INSERTION OF UMBILICAL CORD AFFECTING MANAGEMENT OF MOTHER: ICD-10-CM

## 2019-09-26 DIAGNOSIS — O09.90 HIGH RISK PREGNANCY, ANTEPARTUM: Primary | ICD-10-CM

## 2019-09-26 PROCEDURE — G0463 HOSPITAL OUTPT CLINIC VISIT: HCPCS | Mod: ZF

## 2019-09-26 ASSESSMENT — PAIN SCALES - GENERAL: PAINLEVEL: NO PAIN (0)

## 2019-09-26 NOTE — PROGRESS NOTES
Laurie returns today with lots of questions about GBS positive result.  She has been doing a lot of reading on the internet and she is concerned because of her fast labor with her first, she lives an hour away and with childcare needs it would take approximately 2 hours to get here once labor begins.  She is worried she won't get sufficient antibiotics prior to delivery.  She is otherwise doing well.      /80   Pulse 80   Wt 88 kg (194 lb)   LMP 01/10/2019 (Exact Date)   Breastfeeding? No   BMI 32.28 kg/m    See flowsheet, audible acceleration to high 170s, return to baseline 145-150  Cervical exam deferred to next week.    Assessment/Plan:  39 yo  at 37w0d with pregnancy c/b GBS positive, AMA, hypothyroidism and history of fast labor.  - reviewed treatment plan for GBS and surveillance of infant after delivery.  Given concern for fast labor and multiparity recommend induction of labor at 39 weeks.  We reviewed the process of induction and antibiotic therapy in labor.  Patient is agreeable and was schedule at 39 weeks on 2019 at 8 am.  - last TSH earlier this month normal.  - marginal cord insertion as resolved, no longer an issue  - BP normal  - weight gain appropriate  - O+, Rubella Immune  Carmen Jewell MD

## 2019-09-26 NOTE — NURSING NOTE
Chief Complaint   Patient presents with     Prenatal Care     IGNACIO 37 weeks   Bettina Urban LPN

## 2019-09-26 NOTE — LETTER
September 26, 2019    Laurie Diaz  [address] [address]      To Whom It May Concern:      Laurie Diaz was evaluated today in clinic and it was determined that she be induced a week earlier than originally planned. Patient will be admitted on 10/10/2019 due to pregnancy complications and indication for earlier delivery. Please contact our clinic if you have further questions or concerns regarding the needs of Laurie Diaz.      Sincerely,              Dr. Carmen Jewell

## 2019-10-02 ENCOUNTER — OFFICE VISIT (OUTPATIENT)
Dept: OBGYN | Facility: CLINIC | Age: 39
End: 2019-10-02
Attending: OBSTETRICS & GYNECOLOGY
Payer: COMMERCIAL

## 2019-10-02 VITALS
DIASTOLIC BLOOD PRESSURE: 81 MMHG | WEIGHT: 196.7 LBS | HEIGHT: 65 IN | SYSTOLIC BLOOD PRESSURE: 126 MMHG | BODY MASS INDEX: 32.77 KG/M2 | HEART RATE: 72 BPM

## 2019-10-02 DIAGNOSIS — O09.90 HIGH RISK PREGNANCY, ANTEPARTUM: Primary | ICD-10-CM

## 2019-10-02 PROCEDURE — G0463 HOSPITAL OUTPT CLINIC VISIT: HCPCS | Mod: 25,ZF

## 2019-10-02 PROCEDURE — G0008 ADMIN INFLUENZA VIRUS VAC: HCPCS | Mod: ZF

## 2019-10-02 PROCEDURE — 90686 IIV4 VACC NO PRSV 0.5 ML IM: CPT | Mod: ZF

## 2019-10-02 PROCEDURE — 25000128 H RX IP 250 OP 636: Mod: ZF

## 2019-10-02 ASSESSMENT — MIFFLIN-ST. JEOR: SCORE: 1583.11

## 2019-10-02 NOTE — LETTER
"10/2/2019       RE: Laurie Diaz  [address] [address]     Dear Colleague,    Thank you for referring your patient, Laurie Diaz, to the [facility] at [facility]. Please see a copy of my visit note below.    S: Laurie is a 37 y/o  who is now 37w6d. She has a history of fast labor and is GBS +.  She is scheduled to be induced at 39w.     She is doing well today. She has some questions about what to expect for induction and wants to be sure baby has thyroid screened. She is feeling fetal movement, is not bleeding, has not had any contractions, and has noticed some mild LOF that has been previously worked up as leaking urine or discharge.     O: /81   Pulse 72   Ht 1.651 m (5' 5\")   Wt 89.2 kg (196 lb 11.2 oz)   LMP 01/10/2019 (Exact Date)   BMI 32.73 kg/m     Doppler 145  Cervical exam 1-2 cm and soft, baby cephalic    A/P: Laurie is a 37 y/o  at 37w6d scheduled for induction a week from tomorrow (on 10/10) due to h/o fast labor. She is GBS + and has a history of fast labor.   - Flu shot today   - Cancel appointment for 10/9 unless she feels she needs it    I have reviewed this patient with the attending physician, Dr. Ward.  Katina Hanks, MS3  [employer]    I was present with the medical student who participated in the service and in the documentation of the note. I have verified the history and personally performed the physical exam and medical decision making. I agree with the assessment and plan of care as documented in the note.    Eugenia Ward MD  "

## 2019-10-02 NOTE — PROGRESS NOTES
"S: Laurie is a 39 y/o  who is now 37w6d. She has a history of fast labor and is GBS +.  She is scheduled to be induced at 39w.     She is doing well today. She has some questions about what to expect for induction and wants to be sure baby has thyroid screened. She is feeling fetal movement, is not bleeding, has not had any contractions, and has noticed some mild LOF that has been previously worked up as leaking urine or discharge.     O: /81   Pulse 72   Ht 1.651 m (5' 5\")   Wt 89.2 kg (196 lb 11.2 oz)   LMP 01/10/2019 (Exact Date)   BMI 32.73 kg/m    Doppler 145  Cervical exam 1-2 cm and soft, baby cephalic    A/P: Laurie is a 39 y/o  at 37w6d scheduled for induction a week from tomorrow (on 10/10) due to h/o fast labor. She is GBS + and has a history of fast labor.   - Flu shot today   - Cancel appointment for 10/9 unless she feels she needs it    I have reviewed this patient with the attending physician, Dr. Ward.  Katina Hanks, MS3  [employer]    I was present with the medical student who participated in the service and in the documentation of the note. I have verified the history and personally performed the physical exam and medical decision making. I agree with the assessment and plan of care as documented in the note.    Eugenia Ward MD      "

## 2019-10-04 ENCOUNTER — TELEPHONE (OUTPATIENT)
Dept: OBGYN | Facility: CLINIC | Age: 39
End: 2019-10-04

## 2019-10-04 NOTE — TELEPHONE ENCOUNTER
Received call from Laurie stating she was in clinic two days ago and is now having blood streaked mucous (which she thinks is her mucous plug) and she is wondering if this is OK. Denies contractions.     Informed her that having a cervical check can cause some spotting, but early labor can cause this as well and is OK. Continue to monitor at home. If bleeding becomes like a flow, go to [facility]. If water breaks, go to [facility] due to GBS+ status. She indicated understanding and agreed with plan.

## 2019-10-05 NOTE — TELEPHONE ENCOUNTER
Returned Laurie's call.  at 38w1d. Ctx 2-3m apart, lasting about 50s. Not very painful. Unsure if labor. +mucous but no VB or LOF. Nml FM. H/o fast labor w/ first baby. GBS positive.    D/w Laurie that it is difficult to tell over the phone, and offered for her to labor at home for 30-60m and see if ctx become more intense (realizing she has a history of fast labor and is GBS POS) vs coming in now w/ the possibility of being sent home if her cervix is unchanged. Laurie would like to wait at home for 30m. She will call back if she is still experiencing contractions. I will await her call.    Jyoti Ba MD, MSCI    [facility]/OBGYN

## 2019-10-05 NOTE — TELEPHONE ENCOUNTER
Returned Laurie's second call approximately 30m later. Ctx decreased in frequency. She would like to go to bed. Reassured her that this is a fine plan, and to call if they increase again. Normal FM, no LOF, no VB.    Jyoti Ba MD, MSCI    [facility]/OB[facility]N

## 2019-10-10 ENCOUNTER — ANESTHESIA (OUTPATIENT)
Dept: OBGYN | Facility: CLINIC | Age: 39
End: 2019-10-10
Payer: COMMERCIAL

## 2019-10-10 ENCOUNTER — ANESTHESIA EVENT (OUTPATIENT)
Dept: OBGYN | Facility: CLINIC | Age: 39
End: 2019-10-10
Payer: COMMERCIAL

## 2019-10-10 ENCOUNTER — HOSPITAL ENCOUNTER (INPATIENT)
Facility: CLINIC | Age: 39
LOS: 1 days | Discharge: HOME | End: 2019-10-11
Attending: OBSTETRICS & GYNECOLOGY | Admitting: OBSTETRICS & GYNECOLOGY
Payer: COMMERCIAL

## 2019-10-10 DIAGNOSIS — E03.9 ACQUIRED HYPOTHYROIDISM: ICD-10-CM

## 2019-10-10 PROBLEM — Z34.90 ENCOUNTER FOR INDUCTION OF LABOR: Status: ACTIVE | Noted: 2019-10-10

## 2019-10-10 LAB
ABO + RH BLD: NORMAL
ABO + RH BLD: NORMAL
BLD GP AB SCN SERPL QL: NORMAL
BLOOD BANK CMNT PATIENT-IMP: NORMAL
HGB BLD-MCNC: 12.4 G/DL (ref 11.7–15.7)
PLATELET # BLD AUTO: 237 10E9/L (ref 150–450)
SPECIMEN EXP DATE BLD: NORMAL
T PALLIDUM AB SER QL: NONREACTIVE

## 2019-10-10 PROCEDURE — 25800030 ZZH RX IP 258 OP 636: Performed by: STUDENT IN AN ORGANIZED HEALTH CARE EDUCATION/TRAINING PROGRAM

## 2019-10-10 PROCEDURE — 12000001 ZZH R&B MED SURG/OB UMMC

## 2019-10-10 PROCEDURE — 25000132 ZZH RX MED GY IP 250 OP 250 PS 637: Performed by: STUDENT IN AN ORGANIZED HEALTH CARE EDUCATION/TRAINING PROGRAM

## 2019-10-10 PROCEDURE — 00HU33Z INSERTION OF INFUSION DEVICE INTO SPINAL CANAL, PERCUTANEOUS APPROACH: ICD-10-PCS | Performed by: STUDENT IN AN ORGANIZED HEALTH CARE EDUCATION/TRAINING PROGRAM

## 2019-10-10 PROCEDURE — 40000671 ZZH STATISTIC ANESTHESIA CASE

## 2019-10-10 PROCEDURE — 85049 AUTOMATED PLATELET COUNT: CPT | Performed by: OBSTETRICS & GYNECOLOGY

## 2019-10-10 PROCEDURE — 25000125 ZZHC RX 250

## 2019-10-10 PROCEDURE — 0KQM0ZZ REPAIR PERINEUM MUSCLE, OPEN APPROACH: ICD-10-PCS | Performed by: OBSTETRICS & GYNECOLOGY

## 2019-10-10 PROCEDURE — 86900 BLOOD TYPING SEROLOGIC ABO: CPT | Performed by: OBSTETRICS & GYNECOLOGY

## 2019-10-10 PROCEDURE — 72200001 ZZH LABOR CARE VAGINAL DELIVERY SINGLE

## 2019-10-10 PROCEDURE — 3E033VJ INTRODUCTION OF OTHER HORMONE INTO PERIPHERAL VEIN, PERCUTANEOUS APPROACH: ICD-10-PCS | Performed by: OBSTETRICS & GYNECOLOGY

## 2019-10-10 PROCEDURE — 86901 BLOOD TYPING SEROLOGIC RH(D): CPT | Performed by: OBSTETRICS & GYNECOLOGY

## 2019-10-10 PROCEDURE — 86780 TREPONEMA PALLIDUM: CPT | Performed by: OBSTETRICS & GYNECOLOGY

## 2019-10-10 PROCEDURE — 86850 RBC ANTIBODY SCREEN: CPT | Performed by: OBSTETRICS & GYNECOLOGY

## 2019-10-10 PROCEDURE — 25000125 ZZHC RX 250: Performed by: STUDENT IN AN ORGANIZED HEALTH CARE EDUCATION/TRAINING PROGRAM

## 2019-10-10 PROCEDURE — 3E0R3BZ INTRODUCTION OF ANESTHETIC AGENT INTO SPINAL CANAL, PERCUTANEOUS APPROACH: ICD-10-PCS | Performed by: STUDENT IN AN ORGANIZED HEALTH CARE EDUCATION/TRAINING PROGRAM

## 2019-10-10 PROCEDURE — 25000128 H RX IP 250 OP 636: Performed by: STUDENT IN AN ORGANIZED HEALTH CARE EDUCATION/TRAINING PROGRAM

## 2019-10-10 PROCEDURE — 85018 HEMOGLOBIN: CPT | Performed by: OBSTETRICS & GYNECOLOGY

## 2019-10-10 RX ORDER — NALOXONE HYDROCHLORIDE 0.4 MG/ML
.1-.4 INJECTION, SOLUTION INTRAMUSCULAR; INTRAVENOUS; SUBCUTANEOUS
Status: DISCONTINUED | OUTPATIENT
Start: 2019-10-10 | End: 2019-10-11 | Stop reason: HOSPADM

## 2019-10-10 RX ORDER — LEVOTHYROXINE SODIUM 50 UG/1
50 TABLET ORAL DAILY
Status: DISCONTINUED | OUTPATIENT
Start: 2019-10-10 | End: 2019-10-10

## 2019-10-10 RX ORDER — ONDANSETRON 2 MG/ML
4 INJECTION INTRAMUSCULAR; INTRAVENOUS EVERY 6 HOURS PRN
Status: DISCONTINUED | OUTPATIENT
Start: 2019-10-10 | End: 2019-10-10

## 2019-10-10 RX ORDER — NALOXONE HYDROCHLORIDE 0.4 MG/ML
.1-.4 INJECTION, SOLUTION INTRAMUSCULAR; INTRAVENOUS; SUBCUTANEOUS
Status: DISCONTINUED | OUTPATIENT
Start: 2019-10-10 | End: 2019-10-10

## 2019-10-10 RX ORDER — LIDOCAINE 40 MG/G
CREAM TOPICAL
Status: DISCONTINUED | OUTPATIENT
Start: 2019-10-10 | End: 2019-10-10

## 2019-10-10 RX ORDER — OXYTOCIN/0.9 % SODIUM CHLORIDE 30/500 ML
100 PLASTIC BAG, INJECTION (ML) INTRAVENOUS CONTINUOUS
Status: DISCONTINUED | OUTPATIENT
Start: 2019-10-10 | End: 2019-10-11 | Stop reason: HOSPADM

## 2019-10-10 RX ORDER — CARBOPROST TROMETHAMINE 250 UG/ML
250 INJECTION, SOLUTION INTRAMUSCULAR
Status: DISCONTINUED | OUTPATIENT
Start: 2019-10-10 | End: 2019-10-11 | Stop reason: HOSPADM

## 2019-10-10 RX ORDER — ACETAMINOPHEN 325 MG/1
650 TABLET ORAL EVERY 6 HOURS PRN
Qty: 100 TABLET | Refills: 0 | Status: SHIPPED | OUTPATIENT
Start: 2019-10-10 | End: 2020-08-03

## 2019-10-10 RX ORDER — OXYCODONE AND ACETAMINOPHEN 5; 325 MG/1; MG/1
1 TABLET ORAL
Status: DISCONTINUED | OUTPATIENT
Start: 2019-10-10 | End: 2019-10-10

## 2019-10-10 RX ORDER — OXYTOCIN/0.9 % SODIUM CHLORIDE 30/500 ML
100-340 PLASTIC BAG, INJECTION (ML) INTRAVENOUS CONTINUOUS PRN
Status: COMPLETED | OUTPATIENT
Start: 2019-10-10 | End: 2019-10-10

## 2019-10-10 RX ORDER — MISOPROSTOL 200 UG/1
800 TABLET ORAL
Status: DISCONTINUED | OUTPATIENT
Start: 2019-10-10 | End: 2019-10-11 | Stop reason: HOSPADM

## 2019-10-10 RX ORDER — LIDOCAINE HYDROCHLORIDE AND EPINEPHRINE 15; 5 MG/ML; UG/ML
INJECTION, SOLUTION EPIDURAL PRN
Status: DISCONTINUED | OUTPATIENT
Start: 2019-10-10 | End: 2019-10-10

## 2019-10-10 RX ORDER — ACETAMINOPHEN 325 MG/1
650 TABLET ORAL EVERY 4 HOURS PRN
Status: DISCONTINUED | OUTPATIENT
Start: 2019-10-10 | End: 2019-10-10

## 2019-10-10 RX ORDER — ACETAMINOPHEN 325 MG/1
650 TABLET ORAL EVERY 4 HOURS PRN
Status: DISCONTINUED | OUTPATIENT
Start: 2019-10-10 | End: 2019-10-11 | Stop reason: HOSPADM

## 2019-10-10 RX ORDER — NALBUPHINE HYDROCHLORIDE 10 MG/ML
2.5-5 INJECTION, SOLUTION INTRAMUSCULAR; INTRAVENOUS; SUBCUTANEOUS EVERY 6 HOURS PRN
Status: DISCONTINUED | OUTPATIENT
Start: 2019-10-10 | End: 2019-10-10

## 2019-10-10 RX ORDER — AMOXICILLIN 250 MG
1 CAPSULE ORAL 2 TIMES DAILY
Status: DISCONTINUED | OUTPATIENT
Start: 2019-10-10 | End: 2019-10-11 | Stop reason: HOSPADM

## 2019-10-10 RX ORDER — LIDOCAINE HYDROCHLORIDE 10 MG/ML
INJECTION, SOLUTION EPIDURAL; INFILTRATION; INTRACAUDAL; PERINEURAL
Status: COMPLETED
Start: 2019-10-10 | End: 2019-10-10

## 2019-10-10 RX ORDER — CARBOPROST TROMETHAMINE 250 UG/ML
250 INJECTION, SOLUTION INTRAMUSCULAR
Status: DISCONTINUED | OUTPATIENT
Start: 2019-10-10 | End: 2019-10-10

## 2019-10-10 RX ORDER — IBUPROFEN 800 MG/1
800 TABLET, FILM COATED ORAL
Status: DISCONTINUED | OUTPATIENT
Start: 2019-10-10 | End: 2019-10-10

## 2019-10-10 RX ORDER — IBUPROFEN 600 MG/1
600 TABLET, FILM COATED ORAL EVERY 6 HOURS PRN
Status: DISCONTINUED | OUTPATIENT
Start: 2019-10-10 | End: 2019-10-11 | Stop reason: HOSPADM

## 2019-10-10 RX ORDER — FENTANYL CITRATE 50 UG/ML
50-100 INJECTION, SOLUTION INTRAMUSCULAR; INTRAVENOUS
Status: DISCONTINUED | OUTPATIENT
Start: 2019-10-10 | End: 2019-10-10

## 2019-10-10 RX ORDER — PHENYLEPHRINE HCL IN 0.9% NACL 1 MG/10 ML
100 SYRINGE (ML) INTRAVENOUS EVERY 5 MIN PRN
Status: DISCONTINUED | OUTPATIENT
Start: 2019-10-10 | End: 2019-10-10

## 2019-10-10 RX ORDER — AMOXICILLIN 250 MG
2 CAPSULE ORAL 2 TIMES DAILY
Status: DISCONTINUED | OUTPATIENT
Start: 2019-10-10 | End: 2019-10-11 | Stop reason: HOSPADM

## 2019-10-10 RX ORDER — SODIUM CHLORIDE, SODIUM LACTATE, POTASSIUM CHLORIDE, CALCIUM CHLORIDE 600; 310; 30; 20 MG/100ML; MG/100ML; MG/100ML; MG/100ML
INJECTION, SOLUTION INTRAVENOUS CONTINUOUS
Status: DISCONTINUED | OUTPATIENT
Start: 2019-10-10 | End: 2019-10-10

## 2019-10-10 RX ORDER — LANOLIN 100 %
OINTMENT (GRAM) TOPICAL
Status: DISCONTINUED | OUTPATIENT
Start: 2019-10-10 | End: 2019-10-11 | Stop reason: HOSPADM

## 2019-10-10 RX ORDER — PENICILLIN G POTASSIUM 5000000 [IU]/1
5 INJECTION, POWDER, FOR SOLUTION INTRAMUSCULAR; INTRAVENOUS ONCE
Status: COMPLETED | OUTPATIENT
Start: 2019-10-10 | End: 2019-10-10

## 2019-10-10 RX ORDER — HYDROCORTISONE 2.5 %
CREAM (GRAM) TOPICAL 3 TIMES DAILY PRN
Status: DISCONTINUED | OUTPATIENT
Start: 2019-10-10 | End: 2019-10-11 | Stop reason: HOSPADM

## 2019-10-10 RX ORDER — IBUPROFEN 600 MG/1
600 TABLET, FILM COATED ORAL EVERY 6 HOURS PRN
Qty: 60 TABLET | Refills: 0 | Status: SHIPPED | OUTPATIENT
Start: 2019-10-10 | End: 2019-11-18

## 2019-10-10 RX ORDER — TERBUTALINE SULFATE 1 MG/ML
0.25 INJECTION, SOLUTION SUBCUTANEOUS
Status: DISCONTINUED | OUTPATIENT
Start: 2019-10-10 | End: 2019-10-10

## 2019-10-10 RX ORDER — OXYTOCIN/0.9 % SODIUM CHLORIDE 30/500 ML
340 PLASTIC BAG, INJECTION (ML) INTRAVENOUS CONTINUOUS PRN
Status: DISCONTINUED | OUTPATIENT
Start: 2019-10-10 | End: 2019-10-11 | Stop reason: HOSPADM

## 2019-10-10 RX ORDER — MISOPROSTOL 200 UG/1
TABLET ORAL
Status: DISCONTINUED
Start: 2019-10-10 | End: 2019-10-10 | Stop reason: HOSPADM

## 2019-10-10 RX ORDER — OXYTOCIN 10 [USP'U]/ML
10 INJECTION, SOLUTION INTRAMUSCULAR; INTRAVENOUS
Status: DISCONTINUED | OUTPATIENT
Start: 2019-10-10 | End: 2019-10-10

## 2019-10-10 RX ORDER — AMOXICILLIN 250 MG
1 CAPSULE ORAL DAILY
Qty: 100 TABLET | Refills: 0 | Status: SHIPPED | OUTPATIENT
Start: 2019-10-10 | End: 2020-08-03

## 2019-10-10 RX ORDER — BISACODYL 10 MG
10 SUPPOSITORY, RECTAL RECTAL DAILY PRN
Status: DISCONTINUED | OUTPATIENT
Start: 2019-10-12 | End: 2019-10-11 | Stop reason: HOSPADM

## 2019-10-10 RX ORDER — METHYLERGONOVINE MALEATE 0.2 MG/ML
200 INJECTION INTRAVENOUS
Status: DISCONTINUED | OUTPATIENT
Start: 2019-10-10 | End: 2019-10-10

## 2019-10-10 RX ORDER — OXYTOCIN 10 [USP'U]/ML
10 INJECTION, SOLUTION INTRAMUSCULAR; INTRAVENOUS
Status: DISCONTINUED | OUTPATIENT
Start: 2019-10-10 | End: 2019-10-11 | Stop reason: HOSPADM

## 2019-10-10 RX ORDER — OXYTOCIN/0.9 % SODIUM CHLORIDE 30/500 ML
1-24 PLASTIC BAG, INJECTION (ML) INTRAVENOUS CONTINUOUS
Status: DISCONTINUED | OUTPATIENT
Start: 2019-10-10 | End: 2019-10-10

## 2019-10-10 RX ORDER — LEVOTHYROXINE SODIUM 50 UG/1
25 TABLET ORAL DAILY
Qty: 90 TABLET | Refills: 0 | Status: SHIPPED | OUTPATIENT
Start: 2019-10-10 | End: 2019-10-23

## 2019-10-10 RX ORDER — METHYLERGONOVINE MALEATE 0.2 MG/ML
200 INJECTION INTRAVENOUS
Status: DISCONTINUED | OUTPATIENT
Start: 2019-10-10 | End: 2019-10-11 | Stop reason: HOSPADM

## 2019-10-10 RX ADMIN — IBUPROFEN 600 MG: 600 TABLET, FILM COATED ORAL at 21:40

## 2019-10-10 RX ADMIN — SENNOSIDES AND DOCUSATE SODIUM 1 TABLET: 8.6; 5 TABLET ORAL at 21:40

## 2019-10-10 RX ADMIN — PENICILLIN G POTASSIUM 5 MILLION UNITS: 5000000 POWDER, FOR SOLUTION INTRAMUSCULAR; INTRAPLEURAL; INTRATHECAL; INTRAVENOUS at 09:30

## 2019-10-10 RX ADMIN — LIDOCAINE HYDROCHLORIDE,EPINEPHRINE BITARTRATE 3 ML: 15; .005 INJECTION, SOLUTION EPIDURAL; INFILTRATION; INTRACAUDAL; PERINEURAL at 17:46

## 2019-10-10 RX ADMIN — Medication 340 ML/HR: at 18:29

## 2019-10-10 RX ADMIN — LIDOCAINE HYDROCHLORIDE 3 ML: 10 INJECTION, SOLUTION EPIDURAL; INFILTRATION; INTRACAUDAL; PERINEURAL at 18:33

## 2019-10-10 RX ADMIN — SODIUM CHLORIDE, POTASSIUM CHLORIDE, SODIUM LACTATE AND CALCIUM CHLORIDE 1000 ML: 600; 310; 30; 20 INJECTION, SOLUTION INTRAVENOUS at 17:41

## 2019-10-10 RX ADMIN — Medication 2.5 MILLION UNITS: at 13:52

## 2019-10-10 RX ADMIN — Medication 10 ML/HR: at 17:53

## 2019-10-10 RX ADMIN — Medication 2 MILLI-UNITS/MIN: at 09:34

## 2019-10-10 RX ADMIN — SODIUM CHLORIDE, POTASSIUM CHLORIDE, SODIUM LACTATE AND CALCIUM CHLORIDE: 600; 310; 30; 20 INJECTION, SOLUTION INTRAVENOUS at 09:34

## 2019-10-10 RX ADMIN — Medication 2.5 MILLION UNITS: at 18:06

## 2019-10-10 RX ADMIN — ACETAMINOPHEN 650 MG: 325 TABLET, FILM COATED ORAL at 21:40

## 2019-10-10 RX ADMIN — Medication 10 ML/HR: at 17:15

## 2019-10-10 ASSESSMENT — MIFFLIN-ST. JEOR: SCORE: 1579.93

## 2019-10-10 NOTE — ANESTHESIA PROCEDURE NOTES
Epidural Procedure Note    Staff:     Anesthesiologist:  Keo Ramirez MD  Location: OB and floor     Procedure start time:  10/10/2019 5:30 PM     Procedure end time:  10/10/2019 5:47 PM   Pre-procedure checklist:   patient identified, IV checked, risks and benefits discussed, informed consent, monitors and equipment checked and pre-op evaluation      Correct Patient: Yes      Correct Position: Yes      Correct Site: Yes      Correct Procedure: Yes      Correct Laterality:  Yes    Site Marked:  Yes  Procedure:     Procedure:  Epidural catheter    ASA:  2 and Emergent    Diagnosis:  Labor pain    Position:  Sitting    Sterile Prep: chloraprep, mask, sterile gloves and patient draped      Insertion site:  L3-4    Local skin infiltration:  1% lidocaine    amount (mL):  3    Approach:  Midline    Needle gauge (G):  17    Needle Length (in):  3.5    Block Needle Type:  [place]    Injection Technique:  LORT saline    JOSE MARIA at (cm):  5    Attempts:  1    Redirects:  0    Catheter gauge (G):  19    Catheter threaded easily: Yes      Threaded to cm at skin:  10    Threaded in epidural space (cm):  5    Paresthesias:  No    Aspiration negative for Heme or CSF: Yes      Test dose (mL):  3     Local anesthetic:  Lidocaine 1.5% w/ 1:200,000 epinephrine    Test dose time:  17:46    Test dose negative for signs of intravascular, subdural or intrathecal injection: Yes    Assessment/Narrative:      Performed by Dr. Ramirez

## 2019-10-10 NOTE — ANESTHESIA PREPROCEDURE EVALUATION
Anesthesia Pre-Procedure Evaluation    Patient: Laurie Diaz   MRN:     [id number] Gender:   female   Age:    38 year old :      1980        Preoperative Diagnosis: * No surgery found *        Past Medical History:   Diagnosis Date     Anxiety      Hypothyroidism      Paresthesia       Past Surgical History:   Procedure Laterality Date     ORTHOPEDIC SURGERY  2015    Wirst surgery for torn ligaments          Anesthesia Evaluation       history and physical reviewed .      No history of anesthetic complications          ROS/MED HX    ENT/Pulmonary:  - neg pulmonary ROS     Neurologic:  - neg neurologic ROS     Cardiovascular:  - neg cardiovascular ROS       METS/Exercise Tolerance:     Hematologic:         Musculoskeletal:         GI/Hepatic:  - neg GI/hepatic ROS       Renal/Genitourinary:         Endo:         Psychiatric:         Infectious Disease:         Malignancy:         Other:                     [facility] AN PHYSICAL EXAM    LABS:  CBC:   Lab Results   Component Value Date    WBC 12.3 (H) 2019    WBC 12.0 (H) 07/15/2019    HGB 12.4 10/10/2019    HGB 12.0 2019    HCT 36.0 2019    HCT 35.7 07/15/2019     10/10/2019     2019     BMP:   Lab Results   Component Value Date     2018    POTASSIUM 3.9 2018    CHLORIDE 107 2018    CO2 25 2018    BUN 8 2018    CR 0.52 2018    GLC 88 2018     COAGS: No results found for: PTT, INR, FIBR  POC:   Lab Results   Component Value Date    HCG Positive (A) 2019     OTHER:   Lab Results   Component Value Date    LAURA 8.9 2018    ALBUMIN 4.2 2018    PROTTOTAL 7.3 2018    ALT 18 2018    AST 17 2018    ALKPHOS 57 2018    BILITOTAL 0.9 2018    TSH 2.28 2019        Preop Vitals    BP Readings from Last 3 Encounters:   10/10/19 116/72   10/02/19 126/81   19 129/80    Pulse Readings from Last 3 Encounters:   10/10/19 74   10/02/19 72  "  09/26/19 80      Resp Readings from Last 3 Encounters:   10/10/19 20   04/12/19 16   02/01/19 16    SpO2 Readings from Last 3 Encounters:   04/12/19 100%   02/11/19 99%   02/01/19 100%      Temp Readings from Last 1 Encounters:   10/10/19 37.1  C (98.7  F) (Oral)    Ht Readings from Last 1 Encounters:   10/10/19 1.651 m (5' 5\")      Wt Readings from Last 1 Encounters:   10/10/19 88.9 kg (196 lb)    Estimated body mass index is 32.62 kg/m  as calculated from the following:    Height as of this encounter: 1.651 m (5' 5\").    Weight as of this encounter: 88.9 kg (196 lb).     LDA:  Peripheral IV 10/10/19 Anterior;Left Hand (Active)   Site Assessment WDL 10/10/2019  1:00 PM   Line Status Infusing 10/10/2019  1:00 PM   Phlebitis Scale 0-->no symptoms 10/10/2019  1:00 PM   Infiltration Scale 0 10/10/2019  1:00 PM   Number of days: 0        Assessment:   ASA SCORE: 2 emergent   H&P: History and physical reviewed and following examination; no interval change.    NPO Status: NPO Appropriate     Plan:   Anes. Type:  Epidural     Epidural Details:  Catheter; Lumbar   Pre-Medication: None   Induction:  N/a   Airway: Native Airway   Access/Monitoring: PIV   Maintenance: N/a     Postop Plan:   Postop Pain: Regional  Postop Sedation/Airway: Not planned  Disposition: Inpatient/Admit     PONV Management: Adult Risk Factors: Female     CONSENT: Direct conversation   Plan and risks discussed with: Patient                neg OB EBONY Fitzgerald MD  "

## 2019-10-10 NOTE — PROGRESS NOTES
[facility]  Labor Progress Note    Subjective:  Patient comfortable. Not feeling contractions.    Objective:   Patient Vitals for the past 4 hrs:   BP Temp Temp src Pulse Resp   10/10/19 1356 -- 98.7  F (37.1  C) Oral 74 20   10/10/19 1200 116/72 98.2  F (36.8  C) Oral 85 --     SVE:  3/70/-2, somewhat ballotable head    FHT: Baseline 125, moderate variability, positive accelerations, absent decelerations  [place]: 3 contractions in 10 minutes    Assessment  Ms. Laurie Diaz is a 38 year old , at 39w0d by LMP c/w 8w2d US, who presents for elective induction of labor. Pregnancy complicated by GBS positive status, AMA, hypothyroidism, anxiety.     Plan  #IOL  - Pitocin currently running at 13 mu/min, continue to titrate per protocol. AROM when able.  - GBS positive, PCN now adequate  - Pain: considering epidural  - Anticipate      #Hypothyroidism  - Continue PTA synthroid     #Anxiety  - No meds, mood stable     #FWB  - Cat 1 FHT, continuous montioring    Elder Davey MD  OB/GYN Resident, PGY-2  10/10/2019 3:51 PM

## 2019-10-10 NOTE — PROGRESS NOTES
[facility]  Strip Review Note    Objective:   Patient Vitals for the past 4 hrs:   BP Temp Temp src Pulse Resp   10/10/19 1356 -- 98.7  F (37.1  C) Oral 74 20   10/10/19 1200 116/72 98.2  F (36.8  C) Oral 85 --     FHT: Baseline 135, moderate variability, positive accelerations, absent decelerations  [place]: 3-4 contractions in 10 minutes    Assessment  Ms. Laurie Diaz is a 38 year old , at 39w0d by LMP c/w 8w2d US, who presents for elective induction of labor. Pregnancy complicated by GBS positive status, AMA, hypothyroidism, anxiety.     Plan  #IOL  - Pitocin currently running at 10 mu/min, continue to titrate per protocol. AROM when able.  - GBS positive, PCN started  - Pain: plans epidural  - Anticipate      #Hypothyroidism  - Continue PTA synthroid     #Anxiety  - No meds, mood stable     #FWB  - Cat 1 FHT, continuous montioring    Elder Davey MD  OB/GYN Resident, PGY-2  10/10/2019 2:19 PM

## 2019-10-10 NOTE — H&P
[facility]  OB History and Physical      Laurie Diaz MRN# [id number]   Age: 38 year old YOB: 1980     CC: Induction of labor    HPI:  Ms. Laurie Diaz is a 38 year old  at 39w0d by LMP c/w 8w2d US, who presents for elective IOL. She denies contractions, vaginal bleeding, and loss of fluid. Active fetal movement. Denies headaches, vision changes, chest pain, shortness of breath, nausea/vomiting, urinary issues, worsening swelling.    Pregnancy Complications:  - GBS positive status  - AMA  - Hypothyroidism  - Anxiety    Prenatal Labs:   Lab Results   Component Value Date    ABO O 10/10/2019    RH Pos 10/10/2019    AS Neg 10/10/2019    HEPBANG Nonreactive 2019    CHPCRT Negative 2019    GCPCRT Negative 2019    TREPAB negative 2015    RUBELLAABIGG 27.6 2015    HGB 12.4 10/10/2019       GBS Status:   Lab Results   Component Value Date    GBS Positive (A) 2019       OB History  OB History    Para Term  AB Living   3 1 1 0 1 1   SAB TAB Ectopic Multiple Live Births   0 0 0 0 1      # Outcome Date GA Lbr Sergey/2nd Weight Sex Delivery Anes PTL Lv   3 Current            2 AB 10/2018 8w0d    SAB      1 Term 16 40w2d 10:43 / 01:38 3.74 kg (8 lb 3.9 oz) F Vag-Spont Local, EPI N MARK      Name: Gaviota      Apgar1: 8  Apgar5: 9       PMHx:   Past Medical History:   Diagnosis Date     Anxiety      Hypothyroidism      Paresthesia      PSHx:   Past Surgical History:   Procedure Laterality Date     ORTHOPEDIC SURGERY  2015    Wirst surgery for torn ligaments     Meds:   Medications Prior to Admission   Medication Sig Dispense Refill Last Dose     clobetasol (TEMOVATE) 0.05 % cream    Taking     levothyroxine (SYNTHROID/LEVOTHROID) 50 MCG tablet Take 1 tablet (50 mcg) by mouth daily 90 tablet 0 10/10/2019 at 0800     Prenatal Vit-Fe Fumarate-FA (PRENATAL MULTIVITAMIN  PLUS IRON) 27-0.8 MG TABS Take 1 tablet by mouth daily    10/10/2019 at 0800     Allergies:  No Known Allergies     FmHx:   Family History   Problem Relation Age of Onset     Hypothyroidism Mother      Thyroid Disease Mother      Hypertension Father      Heart Disease Father         heart attack     Diabetes Father      Unknown/Adopted Father      SocHx: She denies any tobacco, alcohol, or other drug use during this pregnancy.    ROS:   Complete 10-point ROS negative except as noted in HPI.    PE:  Vit:   Patient Vitals for the past 4 hrs:   BP Temp Temp src Pulse Resp   10/10/19 1356 -- 98.7  F (37.1  C) Oral 74 20   10/10/19 1200 116/72 98.2  F (36.8  C) Oral 85 --      Gen: Well-appearing, NAD, comfortable   CV: Well perfused   Pulm: Non-labored breathing   Abd: Soft, gravid, non-tender  Ext: Trace LE edema b/l    Cx: 2/50/-3, soft, mid    Pres:  Ceph by BSUS  EFW:  7.5# by [place]  Memb: Intact             FHT: Baseline 140, moderate variability, accelerations present, absent decelerations   [place]: 0-1 contractions in 10 minutes      Assessment  Ms. Laurie Diaz is a 38 year old , at 39w0d by LMP c/w 8w2d US, who presents for elective induction of labor. Pregnancy complicated by GBS positive status, AMA, hypothyroidism, anxiety.    Plan  #IOL  - Admit to L&D, routine labs ordered  - Franco score 8, plan pitocin with AROM when able  - GBS positive, PCN to start with pitocin  - Pain: plans epidural  - Anticipate     #Hypothyroidism  - Continue PTA synthroid    #Anxiety  - No meds, mood stable    #FWB  - Cat 1 FHT, continuous montioring  - Ceph by BSUS    The patient was discussed with Dr. Toribio, who is in agreement with the treatment plan.    Elder Davey MD  OB/GYN Resident, PGY-2  10/10/2019 2:18 PM     Appreciate note by Dr. Davey. Patient has been seen and examined by me separate from the resident, agree with above note.     Ellie Toribio MD

## 2019-10-10 NOTE — PLAN OF CARE
IOL using pitocin continues for this patient, contractions q 3 minutes at this time. FHT Cat 1. IV penicillin being administered q 4 hours. Patient is supported by her  and family at the bedside and denies any discomfort at this time. Continue to monitor closely, expect .

## 2019-10-10 NOTE — PLAN OF CARE
[facility]: [facility]   Reason for maternal/fetal assessment per patient is Induction Of Labor  .  Patient is a . Prenatal record reviewed.      OB History    Para Term  AB Living   3 1 1 0 1 1   SAB TAB Ectopic Multiple Live Births   0 0 0 0 1      # Outcome Date GA Lbr Sergey/2nd Weight Sex Delivery Anes PTL Lv   3 Current            2 AB 10/2018 8w0d    SAB      1 Term 16 40w2d 10:43 / 01:38 3.74 kg (8 lb 3.9 oz) F Vag-Spont Local, EPI N MARK      Name: Gaviota      Apgar1: 8  Apgar5: 9   . Medical history:   Past Medical History:   Diagnosis Date    Anxiety     Hypothyroidism     Paresthesia    . Gestational Age 39w0d. VSS. Fetal movement present. Patient denies cramping, backache, vaginal discharge, pelvic pressure, UTI symptoms, GI problems, bloody show, vaginal bleeding, edema, headache, visual disturbances, epigastric or URQ pain, abdominal pain, rupture of membranes. Support persons are present, her  Bienvenido.  Action: Verbal consent for EFM. Triage assessment completed. EFM applied for fetal well being. Uterine assessment reveal occasional mild contraction not noticed by patient. Fetal assessment: Presumed adequate fetal oxygenation documented (see flow record).   Response: Dr. Ellie Toribio informed of arrival. Plan per provider is IOL with pitocin. Patient verbalized agreement with plan. Patient admitted to room 442 ambulatory, oriented to room and call light.

## 2019-10-11 VITALS
OXYGEN SATURATION: 97 % | HEIGHT: 65 IN | RESPIRATION RATE: 16 BRPM | TEMPERATURE: 98 F | BODY MASS INDEX: 32.65 KG/M2 | WEIGHT: 196 LBS | HEART RATE: 82 BPM | DIASTOLIC BLOOD PRESSURE: 84 MMHG | SYSTOLIC BLOOD PRESSURE: 123 MMHG

## 2019-10-11 LAB — HGB BLD-MCNC: 12.5 G/DL (ref 11.7–15.7)

## 2019-10-11 PROCEDURE — 85018 HEMOGLOBIN: CPT | Performed by: STUDENT IN AN ORGANIZED HEALTH CARE EDUCATION/TRAINING PROGRAM

## 2019-10-11 PROCEDURE — 36415 COLL VENOUS BLD VENIPUNCTURE: CPT | Performed by: STUDENT IN AN ORGANIZED HEALTH CARE EDUCATION/TRAINING PROGRAM

## 2019-10-11 PROCEDURE — 25000132 ZZH RX MED GY IP 250 OP 250 PS 637: Performed by: STUDENT IN AN ORGANIZED HEALTH CARE EDUCATION/TRAINING PROGRAM

## 2019-10-11 RX ADMIN — ACETAMINOPHEN 650 MG: 325 TABLET, FILM COATED ORAL at 16:59

## 2019-10-11 RX ADMIN — SENNOSIDES AND DOCUSATE SODIUM 1 TABLET: 8.6; 5 TABLET ORAL at 07:58

## 2019-10-11 RX ADMIN — Medication 25 MCG: at 07:55

## 2019-10-11 RX ADMIN — ACETAMINOPHEN 650 MG: 325 TABLET, FILM COATED ORAL at 11:06

## 2019-10-11 RX ADMIN — ACETAMINOPHEN 650 MG: 325 TABLET, FILM COATED ORAL at 05:13

## 2019-10-11 RX ADMIN — IBUPROFEN 600 MG: 600 TABLET, FILM COATED ORAL at 11:06

## 2019-10-11 RX ADMIN — IBUPROFEN 600 MG: 600 TABLET, FILM COATED ORAL at 16:56

## 2019-10-11 RX ADMIN — IBUPROFEN 600 MG: 600 TABLET, FILM COATED ORAL at 05:13

## 2019-10-11 NOTE — L&D DELIVERY NOTE
OB Vaginal Delivery Note    Laurie Diaz MRN# [id number]   Age: 38 year old YOB: 1980     Laurie Diaz is a 38 year old  who presented at 39w0d for elective IOL for fast labor.  Pregnancy complications included adequately treated GBS positive status, AMA, hypothyroidism on synthroid, and anxiety.  Labor course was uncomplicated. She received epidural for pain control. Her labor was augmented with pitocin. She was noted to be complete within 15min of SROM with clear fluid.  She pushed with 3 contractions and had a spontaneous vaginal delivery of a viable male infant in KERVIN position.  No nuchal cord was noted, but a compound hand was noted around left cheek.  Apgars of 8 and 9..  The cord was double clamped after 61 seconds and cut.  A cord segment and cord blood were obtained.  The placenta was then delivered using gentle traction and suprapubic pressure.  The uterus was noted to be firm after 30U of IV pitocin and fundal massage.  The perineum was assessed for lacerations and a second degree perineal laceration was noted.  The laceration was repaired in standard fashion using 3-0 Vicryl suture.  On final examination of the perineum, the repair was noted to be hemostatic.  Total EBL was 100.  The placenta appeared intact with a 3V umbilical cord.  Dr. Morales was present for the entire procedure.     GA: 39w0d  GP:   Labor Complications: None   EBL: 100  mL  Delivery QBL:    Delivery Type: Vaginal, Spontaneous   ROM to Delivery Time: (Delivered) Minutes: 23  Gravette Weight:      1 Minute 5 Minute 10 Minute   Apgar Totals: 8    9          LEX HECTOR;BARTOLO MCFARLAND     Delivery Details:  Laurie Diaz, a 38 year old  female delivered a viable infant with apgars of 8   and 9  . Patient was fully dilated and pushing after    hours    minutes in active labor. Delivery was via vaginal, spontaneous  to a sterile field under epidural  anesthesia. Infant delivered in vertex  left  occiput   anterior  position. Anterior and posterior shoulders delivered without difficulty. The cord was clamped, cut twice and 3 vessels  were noted. Cord blood was obtained in routine fashion with the following disposition: lab .      Cord complications: none   Placenta delivered at 10/10/2019  6:31 PM . Placental disposition was Hospital disposal . Fundal massage performed and fundus found to be firm.     Episiotomy: none    Perineum, vagina, cervix were inspected, and the following lacerations were noted:   Perineal lacerations: 2nd                     Any lacerations were repaired in the usual fashion using 3-0 Vicryl.    Excellent hemostasis was noted. Needle count correct. Infant and patient in delivery room in good and stable condition.        Labor Event Times    Labor onset date:  10/10/19 Onset time:   5:00 PM   Dilation complete date:  10/10/19 Complete time:   6:15 PM   Start pushing date/time:  10/10/2019 1820      Labor Events     labor?:  No   steroids:  None  Labor Type:  Induction  Predominate monitoring during 1st stage:  continuous electronic fetal monitoring     Antibiotics received during labor?:  Yes  Reason for Antibiotics:  GBS  Antibiotics received for GBS:  Penicillin  Antibiotics Given (GBS):  Greater than 4 hours prior to delivery     Rupture date/time: 10/10/19 1802   Rupture type:  Spontaneous rupture of membranes occuring during spontaneous labor or augmentation  Fluid color:  Pink  Fluid odor:  Normal     Induction:  Oxytocin  Induction date/time:     Cervical ripening date/time:        1:1 continuous labor support provided by?:  RN Labor partogram used?:  no      Delivery/Placenta Date and Time    Delivery Date:  10/10/19 Delivery Time:   6:25 PM   Placenta Date/Time:  10/10/2019  6:31 PM  Oxytocin given at the time of delivery:  after delivery of baby     Vaginal Counts     Initial count performed by 2 team members:   Two Team Members   Dr. Carmen Almonte RNC        Needles Suture [place] Sponges Instruments   Initial counts 2 1 5    Added to count  1 5    Final counts       Placed during labor Accounted for at the end of labor   NA NA   NA NA   NA NA               Apgars    Living status:  Living   1 Minute 5 Minute 10 Minute 15 Minute 20 Minute   Skin color: 0  1       Heart rate: 2  2       Reflex irritability: 2  2       Muscle tone: 2  2       Respiratory effort: 2  2       Total: 8  9       Apgars assigned by:  LEX HECTOR RNC     Cord    Vessels:  3 Vessels Complications:  None   Cord Blood Disposition:  Lab Gases Sent?:  No       Resuscitation    Methods:  None       [place] Care at Delivery:  Spontaneous cry, to mother for skin to skin     Skin to Skin and Feeding Plan    Skin to skin initiation date/time: 1/10/1841    Skin to skin with:  Mother  Skin to skin end date/time:     How do you plan to feed your baby:  Breastfeeding     Labor Events and Shoulder Dystocia    Fetal Tracing Prior to Delivery:  Category 1  Shoulder dystocia present?:  Neg             Delivery (Maternal) (Provider to Complete) ([id number])    Episiotomy:  None  Perineal lacerations:  2nd Repaired?:  Yes   Est. blood loss (mL):  100     Blood Loss  Mother: Laurie Diaz #[id number]   Start of Mother's Information    IO Blood Loss  10/10/19 1700 - 10/10/19 1909    EBL (mL) Hospital Encounter 100 mL    Delivery QBL (mL) Hospital Encounter -130 mL    Total  -30 mL         End of Mother's Information  Mother: Laurie Diaz #[id number]         Delivery - Provider to Complete ([id number])    Delivering clinician:  Maddie Morales MD  Attempted Delivery Types (Choose all that apply):  Spontaneous Vaginal Delivery  Delivery Type (Choose the 1 that will go to the Birth History):  Vaginal, Spontaneous   Other personnel:   Provider Role   Lex Hector RN Registered Nurse   Lynsey Menon MD Resident         Placenta    Delayed Cord Clamping:  Done  Date/Time:  10/10/2019  6:31 PM  Removal:   Spontaneous  Disposition:  Hospital disposal     Anesthesia    Method:  Epidural  Cervical dilation at placement:  4-7          Presentation and Position    Presentation:  Vertex  Position:  Left Occiput Anterior         Lynsey Menon MD ([email or web address])  N O[facility] PGY-2  Personal pager: [phone]  10/10/2019    Staff MD Note  I was present and scrubbed for the entire procedure noted above.  I agree with the description above and any necessary changes have been made by me.  Maddie Morales MD

## 2019-10-11 NOTE — DISCHARGE SUMMARY
[facility] Discharge Summary    Laurie Diaz MRN# [id number]   Age: 38 year old YOB: 1980     Date of Admission:  10/10/2019  Date of Discharge:  10/11/19   Admitting Physician:  Ellie Toribio MD  Discharge Physician:  Jyoti Ba MD    Admit Dx:   - 38 year old  Intrauterine pregnancy at 39w0d   - GBS positive status  - AMA  - Hypothyroidism  - Anxiety    Discharge Dx:  - Same as above, s/p     Procedures:  - Spontaneous vaginal delivery  - Epidural analgesia    Admit HPI/Labor Course:  Laurie Diaz is a 38 year old  who presented at 39w0d for elective IOL for fast labor.  Pregnancy complications included adequately treated GBS positive status, AMA, hypothyroidism on synthroid, and anxiety. Labor course was uncomplicated. She received epidural for pain control. Her labor was augmented with pitocin. She was noted to be complete within 15min of SROM with clear fluid.  She pushed with 3 contractions and had a spontaneous vaginal delivery of a viable male infant in KERVIN position.  No nuchal cord was noted, but a compound hand was noted around left cheek.  Apgars of 8 and 9..  The cord was double clamped after 61 seconds and cut.  A cord segment and cord blood were obtained.  The placenta was then delivered using gentle traction and suprapubic pressure.  The uterus was noted to be firm after 30U of IV pitocin and fundal massage.  The perineum was assessed for lacerations and a second degree perineal laceration was noted.  The laceration was repaired in standard fashion using 3-0 suture.  On final examination of the perineum, the repair was noted to be hemostatic.  Total EBL was 100.  The placenta appeared intact with a 3V umbilical cord.    Please see her Admission H&P and Delivery Summary for further details.    Postpartum Course:  Her postpartum course was uncomplicated. On PPD#1, she was meeting all of her postpartum goals and deemed stable for  discharge. She was voiding without difficulty, tolerating a regular diet without nausea and vomiting, her pain was well controlled on oral pain medicines and her lochia was appropriate. Her hemoglobin prior to delivery was 12.4 and after delivery was 12.5. Her Rh status was positive, and Rhogam was not indicated.     Discharge Medications:     Review of your medicines      START taking      Dose / Directions   acetaminophen 325 MG tablet  Commonly known as:  TYLENOL  Used for:   (normal spontaneous vaginal delivery)      Dose:  650 mg  Take 2 tablets (650 mg) by mouth every 6 hours as needed for mild pain Start after Delivery.  Quantity:  100 tablet  Refills:  0     ibuprofen 600 MG tablet  Commonly known as:  ADVIL/MOTRIN  Used for:   (normal spontaneous vaginal delivery)      Dose:  600 mg  Take 1 tablet (600 mg) by mouth every 6 hours as needed for moderate pain Start after delivery  Quantity:  60 tablet  Refills:  0     senna-docusate 8.6-50 MG tablet  Commonly known as:  SENOKOT-S/PERICOLACE  Used for:   (normal spontaneous vaginal delivery)      Dose:  1 tablet  Take 1 tablet by mouth daily Start after delivery.  Quantity:  100 tablet  Refills:  0        CONTINUE these medicines which may have CHANGED, or have new prescriptions. If we are uncertain of the size of tablets/capsules you have at home, strength may be listed as something that might have changed.      Dose / Directions   levothyroxine 50 MCG tablet  Commonly known as:  SYNTHROID/LEVOTHROID  This may have changed:  how much to take  Used for:  Acquired hypothyroidism      Dose:  25 mcg  Take 0.5 tablets (25 mcg) by mouth daily  Quantity:  90 tablet  Refills:  0        CONTINUE these medicines which have NOT CHANGED      Dose / Directions   clobetasol 0.05 % external cream  Commonly known as:  TEMOVATE      Refills:  0     prenatal multivitamin w/iron 27-0.8 MG tablet      Dose:  1 tablet  Take 1 tablet by mouth daily  Refills:  0            Where to get your medicines      These medications were sent to [facility] [place], [place] - [address]  [address] [facility], [facility] [address]    Phone:  [phone]     acetaminophen 325 MG tablet    ibuprofen 600 MG tablet    levothyroxine 50 MCG tablet    senna-docusate 8.6-50 MG tablet       Discharge/Disposition:  Laurie Diaz was discharged to home in stable condition with the following instructions/medications:  1) Call for temperature > 100.4, bright red vaginal bleeding >1 pad an hour x 2 hours, foul smelling vaginal discharge, pain not controlled by usual oral pain meds, persistent nausea and vomiting not controlled on medications  2) She desired condoms for contraception.  3) For feeding she decided to breast feed.  4) She was instructed to follow-up with her primary OB in 6 weeks for a routine postpartum visit.    Elder Davey MD  OB/GYN Resident, PGY-2  10/11/2019 7:47 AM

## 2019-10-11 NOTE — PLAN OF CARE
VSS. Perineal pain well controlled with Tylenol and Ibuprofen.  Pt breastfeeding infant on cue, good latch noted. Voiding and passing flatus without difficulty. Postpartum checks WNL. Pt bonding appropriately with infant. Spouse, Celio, present at bedside.

## 2019-10-11 NOTE — PLAN OF CARE
Data: Laurie Diaz transferred to 7123 via wheelchair at 2030. Baby transferred via parent's arms.  Action: Receiving unit notified of transfer: Yes. Patient and family notified of room change. Report given to CJ Garcia RN at . Belongings sent to receiving unit. Accompanied by Registered Nurse. Oriented patient to surroundings. Call light within reach. ID bands double-checked with receiving RN. Patient voided and emptied bladder prior to transfer.  Response: Patient tolerated transfer and is stable.

## 2019-10-11 NOTE — PLAN OF CARE
VSS. Ambulating well. Voiding. Breastfeeding infant independently. Pain controlled with medications and perineal ice packs. Fundus firm, light-scant flow.

## 2019-10-11 NOTE — PROGRESS NOTES
"[facility]stetric[facility] Postpartum Progress Note  Laurie Diaz, [id number], 1980   PPD#1 s/p   S: She is resting comfortably in bed this morning. Minimal complaints. Pain is improving and well controlled on current medication regimen. She is tolerating PO intake. Lochia present and similar to a period. Voiding spontaneously. She is ambulating without dizziness or difficulty. Plans to breastfeed. Plans to use condoms for birth control.  O:  Patient Vitals for the past 24 hrs:   BP Temp Temp src Pulse Resp SpO2 Height Weight   10/10/19 1848 108/56 97.4  F (36.3  C) Oral -- -- 99 % -- --   10/10/19 1832 114/66 -- -- -- -- 99 % -- --   10/10/19 1802 121/60 97.8  F (36.6  C) Oral -- -- 100 % -- --   10/10/19 1800 122/63 -- -- -- -- -- -- --   10/10/19 1758 125/61 -- -- -- -- 100 % -- --   10/10/19 1756 125/60 -- -- -- -- -- -- --   10/10/19 1754 123/61 -- -- -- -- -- -- --   10/10/19 1751 126/60 -- -- -- -- 100 % -- --   10/10/19 1750 (!) 155/79 -- -- -- (P) 18 -- -- --   10/10/19 1356 -- 98.7  F (37.1  C) Oral 74 20 -- -- --   10/10/19 1200 116/72 98.2  F (36.8  C) Oral 85 -- -- -- --   10/10/19 0841 -- -- -- -- -- -- 1.651 m (5' 5\") 88.9 kg (196 lb)   10/10/19 0830 119/74 98.3  F (36.8  C) Oral 77 -- -- -- --     General: NAD. A&Ox3.  CV: Regular rate, well perfused.   Pulm: Normal respiratory effort.  Abd: Soft, non-tender, non-distended. Fundus is firm and 4 cm below the umbilicus.    Ext: No lower extremity edema bilaterally. No calf tenderness.    Hemoglobin   Date Value Ref Range Status   10/10/2019 12.4 11.7 - 15.7 g/dL Final   2019 12.0 11.7 - 15.7 g/dL Final     Hgb 12.4 >      A/P: 38 year old  PPD#1 s/p  without complications. Desires to go home today. Questions and concerns discussed.     #Routine postpartum cares.   Rubella immune; Rh positive, GBS positive  Heme: Hgb as above, will prescribe PO iron if hgb <10 on discharge, asymptomatic from acute blood loss anemia, no " tachycardia or hypotension per vitals review  Breast feeding  Planning on condoms for contraception  Anticipate discharge on PPD#1-2 once meeting postpartum discharge goals: voiding & ambulating without difficulty, tolerating a regular diet without nausea and vomiting, passing flatus, pain well controlled on oral pain medicines, lochia appropriate, afebrile with vital signs within normal limits.     #Hypothyroidism  - Reduced to pre-pregnancy synthroid dose at 25mcg    Lynsey Menon MD ([email or web address])  N OBGYN PGY-2  Personal pager: [phone]  10/11/2019     OBGYN Attending Addendum     Ms. Laurie Diaz was seen and examined by me separately from the team.  I have reviewed and agree with the above note by Dr. Menon.    I personally reviewed the following: vitals, meds, labs. Hgb today 12.5.     I agree with the plan for routine PP cares. Will discharge Laurie home today as long as baby is able to be discharged (requires 24h of monitoring for GBS).     Laurie Diaz and I discussed the following: signs and symptoms of postpartum depression, signs and symptoms of infection, signs and symptoms of heavy vaginal bleeding, infant safe sleep. Recommended a [facility] week [facility] visit at [facility], and a 2 week mood check offered if Laurie would like, to be scheduled by her at her convenience. Questions answered.    Jyoti Ba MD, MSCI  Date of Service: 10/11/2019

## 2019-10-12 NOTE — PLAN OF CARE
Discharged to home at 2005 this evening: escorted to car by NST, along with infant. Has AVS, which was reviewed. Patient denies any further questions/concerns. She declined her discharge medication synthroid, saying the dose was incorrect and she already has medication at home. She states she will follow up with provider to have that medication renewed in the correct dosage. Already has breast pump at home. Declined home care nurse visit. States she feels comfortable with breastfeeding independently. Will follow up with provider by 6 weeks postpartum.

## 2019-10-12 NOTE — PROVIDER NOTIFICATION
10/11/19 1928   Provider Notification   Provider Name/Title Dr Ba   Method of Notification Electronic Page   Request Evaluate-Remote   Notification Reason Other  (discharge order?)

## 2019-10-17 ENCOUNTER — TELEPHONE (OUTPATIENT)
Dept: OBGYN | Facility: CLINIC | Age: 39
End: 2019-10-17

## 2019-10-17 NOTE — TELEPHONE ENCOUNTER
Laurie called to report bilateral nipple pain 1 week post partum. States pain is worst with latching and un latching, and pain is present but mild outside of feeding and while pumping.    Discussed normal pain of breastfeeding 1-2 weeks after breastfeeding established, and other possible causes of pain (poor latch, thrush, bacterial infection, anatomical factors, etc). Advised best to be seen in clinic by midwife or with lactation to determine what may be causing the pain.    Offered appointment in clinic this week, but patient states she has appt in [place] with lactation next week and is OK waiting until this time. Pt has further questions about expressing breast milk and bottle feeding -- advised certainly OK to do this if less painful but advised some downfalls related to early bottle feeding and pumping rather than feeding direct from breast including nipple/breast confusion/refusal and potential effects on milk supply. Laurie states baby is producing desired amount of wet and dirty diapers. No concern about milk transfer.     Nurse recommended if any concerns or questions between now and her appt with lactation next week can call or come into clinic but if otherwise feels comfortable managing discomfort with home and OTC remedies and alternating expressing milk and feeding from breast, can await lactation appt.    Pt expressed agrees with plan and will call if any further questions or concerns.

## 2019-10-21 ENCOUNTER — MYC MEDICAL ADVICE (OUTPATIENT)
Dept: OBGYN | Facility: CLINIC | Age: 39
End: 2019-10-21

## 2019-10-21 DIAGNOSIS — B37.89 CANDIDIASIS OF BREAST: Primary | ICD-10-CM

## 2019-10-21 DIAGNOSIS — E03.9 ACQUIRED HYPOTHYROIDISM: ICD-10-CM

## 2019-10-23 ENCOUNTER — OFFICE VISIT (OUTPATIENT)
Dept: PEDIATRICS | Facility: CLINIC | Age: 39
End: 2019-10-23
Attending: PEDIATRICS
Payer: COMMERCIAL

## 2019-10-23 DIAGNOSIS — Z71.89 ENCOUNTER FOR BREAST FEEDING COUNSELING: Primary | ICD-10-CM

## 2019-10-23 PROCEDURE — G0463 HOSPITAL OUTPT CLINIC VISIT: HCPCS | Mod: ZF

## 2019-10-23 RX ORDER — LEVOTHYROXINE SODIUM 50 UG/1
50 TABLET ORAL DAILY
Qty: 90 TABLET | Refills: 1 | Status: SHIPPED | OUTPATIENT
Start: 2019-10-23 | End: 2020-04-26

## 2019-10-23 RX ORDER — FLUCONAZOLE 100 MG/1
TABLET ORAL
Qty: 30 TABLET | Refills: 0 | Status: SHIPPED | OUTPATIENT
Start: 2019-10-23 | End: 2020-08-03

## 2019-10-23 NOTE — TELEPHONE ENCOUNTER
Called and spoke to Laurie who reports when she was discharged from the hospital she didn't  her levothyroxine because it was prescribed as half tablet (25 mcg) in error and all during her pregnancy she took 50 mcg daily.   She delivered 10/10/19.   She also just came back from seeing a LC who diagnosed her with candida of the breast and was told to call for diflucan prescription. She also is waiting to hear back from her pediatrician office to get medication for her baby for thrush.    Spoke to Dr Miller in clinic and approved re-ordering levothyroxine 50 mcg daily and diflucan per protocol.    Instructed Laurie to remind providers to check a TSH at her postpartum visit.insturcted to call back if diflucan doesn't improve symptoms.Pt indicated understanding and agreed with plan.

## 2019-10-25 NOTE — PROGRESS NOTES
[facility]  Phone (Appts): [phone]        Lactation Visit    Infants Name:  Jose Diaz  Medical Record Number:  [id number]  : 10/10/2019  Baby's Current age: 40w6d    Date of Visit: 10/23/19    Pregnancy, Delivery, Breastfeeding History: uneventful pregnancy, vaginal delivery, GBS positive and treated     Current Feeding Plan: has been intermittently breast feeding, sibling was pumped and bottle fed breast milk    Current Meds/Herbals: levothyroxine    Assessment of Mother: has hx of thrush with sibling, describes burning pain in both breasts after breast feeding  for longer than 1 hour, slight redness noted around both nipples, no scales noted and mother denies itching    Assessment of Baby: did not examine sleeping babe, has an appointment tomorrow at Peds clinic    Summary:   Melba is not wanting to breast feed during this possible thrush outbreak  She expresses desire to ultimately exclusively pump    Recommendations:  1. Contact OB for guidance on possible thrush       Contact your Peds to let them know you are being treated for truch   2. Monitor breast milk volume to maintain 25-30 ounces per day  3. Gradually decrease frequency of pumping by 5 to 10 minutes every 3-4 days       Wait another 2 days before further decreasing  4. Length of time pumping usually is manageable at 10-15 minutes per session   5. Monitor your nipple size when continuing pumping as it may increase in size and you will need to change the breast shield size         Lactation Consultant: DELMER AkersN, RN, IBCLC    [email or web address]    Total Patient Care Time: 30 minutes, of which >75% of time spent on breastfeeding counseling.

## 2019-11-18 ENCOUNTER — PRENATAL OFFICE VISIT (OUTPATIENT)
Dept: OBGYN | Facility: CLINIC | Age: 39
End: 2019-11-18
Attending: ADVANCED PRACTICE MIDWIFE
Payer: COMMERCIAL

## 2019-11-18 VITALS
DIASTOLIC BLOOD PRESSURE: 85 MMHG | SYSTOLIC BLOOD PRESSURE: 133 MMHG | HEART RATE: 74 BPM | WEIGHT: 183.8 LBS | HEIGHT: 65 IN | BODY MASS INDEX: 30.62 KG/M2

## 2019-11-18 DIAGNOSIS — K59.00 CONSTIPATION, UNSPECIFIED CONSTIPATION TYPE: ICD-10-CM

## 2019-11-18 DIAGNOSIS — E03.9 HYPOTHYROIDISM, UNSPECIFIED TYPE: ICD-10-CM

## 2019-11-18 PROBLEM — O43.199 MARGINAL INSERTION OF UMBILICAL CORD AFFECTING MANAGEMENT OF MOTHER: Status: RESOLVED | Noted: 2019-06-14 | Resolved: 2019-11-18

## 2019-11-18 PROBLEM — Z34.90 ENCOUNTER FOR INDUCTION OF LABOR: Status: RESOLVED | Noted: 2019-10-10 | Resolved: 2019-11-18

## 2019-11-18 PROBLEM — B95.1 POSITIVE GBS TEST: Status: RESOLVED | Noted: 2019-09-21 | Resolved: 2019-11-18

## 2019-11-18 PROBLEM — O09.90 HIGH RISK PREGNANCY, ANTEPARTUM: Status: RESOLVED | Noted: 2019-03-26 | Resolved: 2019-11-18

## 2019-11-18 PROBLEM — O09.529 AMA (ADVANCED MATERNAL AGE) MULTIGRAVIDA 35+: Status: RESOLVED | Noted: 2018-09-19 | Resolved: 2019-11-18

## 2019-11-18 LAB — TSH SERPL DL<=0.005 MIU/L-ACNC: 1.79 MU/L (ref 0.4–4)

## 2019-11-18 PROCEDURE — 84443 ASSAY THYROID STIM HORMONE: CPT | Performed by: ADVANCED PRACTICE MIDWIFE

## 2019-11-18 PROCEDURE — 36415 COLL VENOUS BLD VENIPUNCTURE: CPT | Performed by: ADVANCED PRACTICE MIDWIFE

## 2019-11-18 PROCEDURE — G0463 HOSPITAL OUTPT CLINIC VISIT: HCPCS | Mod: ZF

## 2019-11-18 RX ORDER — SENNOSIDES A AND B 8.6 MG/1
1 TABLET, FILM COATED ORAL DAILY
Qty: 90 TABLET | Refills: 3 | Status: SHIPPED | OUTPATIENT
Start: 2019-11-18 | End: 2020-08-03

## 2019-11-18 ASSESSMENT — ANXIETY QUESTIONNAIRES
7. FEELING AFRAID AS IF SOMETHING AWFUL MIGHT HAPPEN: NOT AT ALL
6. BECOMING EASILY ANNOYED OR IRRITABLE: NOT AT ALL
5. BEING SO RESTLESS THAT IT IS HARD TO SIT STILL: NOT AT ALL
GAD7 TOTAL SCORE: 0
1. FEELING NERVOUS, ANXIOUS, OR ON EDGE: NOT AT ALL
3. WORRYING TOO MUCH ABOUT DIFFERENT THINGS: NOT AT ALL
2. NOT BEING ABLE TO STOP OR CONTROL WORRYING: NOT AT ALL

## 2019-11-18 ASSESSMENT — PATIENT HEALTH QUESTIONNAIRE - PHQ9
5. POOR APPETITE OR OVEREATING: NOT AT ALL
SUM OF ALL RESPONSES TO PHQ QUESTIONS 1-9: 0

## 2019-11-18 ASSESSMENT — PAIN SCALES - GENERAL: PAINLEVEL: NO PAIN (0)

## 2019-11-18 ASSESSMENT — MIFFLIN-ST. JEOR: SCORE: 1524.59
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
## 2019-11-19 ASSESSMENT — ANXIETY QUESTIONNAIRES: GAD7 TOTAL SCORE: 0

## 2019-12-06 NOTE — NURSING NOTE
Chief Complaint   Patient presents with     Prenatal Care     IGNACIO 16 weeks and 4 days   Bettina Urabn LPN   1.78

## 2020-03-10 ENCOUNTER — HEALTH MAINTENANCE LETTER (OUTPATIENT)
Age: 40
End: 2020-03-10

## 2020-04-26 ENCOUNTER — MYC REFILL (OUTPATIENT)
Dept: OBGYN | Facility: CLINIC | Age: 40
End: 2020-04-26

## 2020-04-26 DIAGNOSIS — E03.9 ACQUIRED HYPOTHYROIDISM: ICD-10-CM

## 2020-04-28 RX ORDER — LEVOTHYROXINE SODIUM 50 UG/1
50 TABLET ORAL DAILY
Qty: 90 TABLET | Refills: 2 | Status: SHIPPED | OUTPATIENT
Start: 2020-04-28 | End: 2021-01-26

## 2020-04-28 NOTE — TELEPHONE ENCOUNTER
Received refill request for levothyroxine.  Last in clinic November 2019 for postpartum visit. TSH drawn at that time and was normal. Refill sent per protocol.

## 2020-08-03 ENCOUNTER — OFFICE VISIT (OUTPATIENT)
Dept: OBGYN | Facility: CLINIC | Age: 40
End: 2020-08-03
Payer: COMMERCIAL

## 2020-08-03 VITALS — DIASTOLIC BLOOD PRESSURE: 76 MMHG | SYSTOLIC BLOOD PRESSURE: 116 MMHG | WEIGHT: 177 LBS | BODY MASS INDEX: 29.45 KG/M2

## 2020-08-03 DIAGNOSIS — E03.9 HYPOTHYROIDISM, UNSPECIFIED TYPE: ICD-10-CM

## 2020-08-03 DIAGNOSIS — N83.202 LEFT OVARIAN CYST: ICD-10-CM

## 2020-08-03 DIAGNOSIS — Z01.419 ENCOUNTER FOR GYNECOLOGICAL EXAMINATION WITHOUT ABNORMAL FINDING: Primary | ICD-10-CM

## 2020-08-03 LAB — TSH SERPL DL<=0.005 MIU/L-ACNC: 2.25 MU/L (ref 0.4–4)

## 2020-08-03 PROCEDURE — 84443 ASSAY THYROID STIM HORMONE: CPT | Performed by: ADVANCED PRACTICE MIDWIFE

## 2020-08-03 PROCEDURE — 99395 PREV VISIT EST AGE 18-39: CPT | Performed by: ADVANCED PRACTICE MIDWIFE

## 2020-08-03 PROCEDURE — 36415 COLL VENOUS BLD VENIPUNCTURE: CPT | Performed by: ADVANCED PRACTICE MIDWIFE

## 2020-08-03 NOTE — NURSING NOTE
"Chief Complaint   Patient presents with     Physical       Initial /76 (BP Location: Right arm, Cuff Size: Adult Regular)   Wt 80.3 kg (177 lb)   LMP 2020   BMI 29.45 kg/m   Estimated body mass index is 29.45 kg/m  as calculated from the following:    Height as of 19: 1.651 m (5' 5\").    Weight as of this encounter: 80.3 kg (177 lb).  BP completed using cuff size: regular    Questioned patient about current smoking habits.  Pt. has never smoked.          The following HM Due: NONE    Neva Toussaint CMA    "
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
## 2020-08-05 ENCOUNTER — ANCILLARY PROCEDURE (OUTPATIENT)
Dept: ULTRASOUND IMAGING | Facility: CLINIC | Age: 40
End: 2020-08-05
Attending: ADVANCED PRACTICE MIDWIFE
Payer: COMMERCIAL

## 2020-08-05 DIAGNOSIS — N83.202 LEFT OVARIAN CYST: ICD-10-CM

## 2020-08-05 PROCEDURE — 76830 TRANSVAGINAL US NON-OB: CPT | Performed by: OBSTETRICS & GYNECOLOGY

## 2020-08-05 PROCEDURE — 76856 US EXAM PELVIC COMPLETE: CPT | Performed by: OBSTETRICS & GYNECOLOGY

## 2020-08-07 ENCOUNTER — TELEPHONE (OUTPATIENT)
Dept: OBGYN | Facility: CLINIC | Age: 40
End: 2020-08-07

## 2020-08-07 DIAGNOSIS — N83.202 LEFT OVARIAN CYST: Primary | ICD-10-CM

## 2020-08-07 NOTE — TELEPHONE ENCOUNTER
Pt called back, discussed results.  Order placed for 8 week followup ultrasound   DWAIN Cedillo CNM 8/7/2020 1:43 PM

## 2020-08-07 NOTE — TELEPHONE ENCOUNTER
Called and left message for pt to call back.  Ultrasound showed ovarian cyst still about the same size, will plan to repeat ultrasound in 8 weeks to ensure resolution.     DWAIN Cedillo CNM 8/7/2020 1:37 PM

## 2020-08-25 ENCOUNTER — MYC MEDICAL ADVICE (OUTPATIENT)
Dept: OBGYN | Facility: CLINIC | Age: 40
End: 2020-08-25

## 2020-08-25 DIAGNOSIS — Z11.3 SCREEN FOR STD (SEXUALLY TRANSMITTED DISEASE): Primary | ICD-10-CM

## 2020-08-25 NOTE — TELEPHONE ENCOUNTER
Miracle, can you please place the orders for the STD testing. Pt will schedule a lab only appt.     Tracy GONZALEZ RN

## 2020-08-25 NOTE — TELEPHONE ENCOUNTER
[facility]  Pt states no sx, just desires testing.    Jennifer Suarez RN

## 2020-09-01 DIAGNOSIS — Z11.3 SCREEN FOR STD (SEXUALLY TRANSMITTED DISEASE): ICD-10-CM

## 2020-09-01 PROCEDURE — 87591 N.GONORRHOEAE DNA AMP PROB: CPT | Performed by: ADVANCED PRACTICE MIDWIFE

## 2020-09-01 PROCEDURE — 36415 COLL VENOUS BLD VENIPUNCTURE: CPT | Performed by: ADVANCED PRACTICE MIDWIFE

## 2020-09-01 PROCEDURE — 87389 HIV-1 AG W/HIV-1&-2 AB AG IA: CPT | Performed by: ADVANCED PRACTICE MIDWIFE

## 2020-09-01 PROCEDURE — 86780 TREPONEMA PALLIDUM: CPT | Performed by: ADVANCED PRACTICE MIDWIFE

## 2020-09-01 PROCEDURE — 87491 CHLMYD TRACH DNA AMP PROBE: CPT | Performed by: ADVANCED PRACTICE MIDWIFE

## 2020-09-02 LAB — HIV 1+2 AB+HIV1 P24 AG SERPL QL IA: NONREACTIVE

## 2020-09-03 LAB
C TRACH DNA SPEC QL NAA+PROBE: NEGATIVE
N GONORRHOEA DNA SPEC QL NAA+PROBE: NEGATIVE
SPECIMEN SOURCE: NORMAL
SPECIMEN SOURCE: NORMAL
T PALLIDUM AB SER QL: NONREACTIVE

## 2020-11-04 ENCOUNTER — ANCILLARY PROCEDURE (OUTPATIENT)
Dept: ULTRASOUND IMAGING | Facility: CLINIC | Age: 40
End: 2020-11-04
Attending: ADVANCED PRACTICE MIDWIFE
Payer: COMMERCIAL

## 2020-11-04 DIAGNOSIS — N83.202 LEFT OVARIAN CYST: ICD-10-CM

## 2020-11-04 PROCEDURE — 76856 US EXAM PELVIC COMPLETE: CPT | Performed by: OBSTETRICS & GYNECOLOGY

## 2020-11-04 PROCEDURE — 76830 TRANSVAGINAL US NON-OB: CPT | Performed by: OBSTETRICS & GYNECOLOGY

## 2020-11-09 ENCOUNTER — PREP FOR PROCEDURE (OUTPATIENT)
Dept: OBGYN | Facility: CLINIC | Age: 40
End: 2020-11-09

## 2020-11-09 ENCOUNTER — OFFICE VISIT (OUTPATIENT)
Dept: OBGYN | Facility: CLINIC | Age: 40
End: 2020-11-09
Payer: COMMERCIAL

## 2020-11-09 VITALS — WEIGHT: 177 LBS | DIASTOLIC BLOOD PRESSURE: 78 MMHG | BODY MASS INDEX: 29.45 KG/M2 | SYSTOLIC BLOOD PRESSURE: 118 MMHG

## 2020-11-09 DIAGNOSIS — Z30.2 REQUEST FOR STERILIZATION: ICD-10-CM

## 2020-11-09 DIAGNOSIS — N83.292 COMPLEX CYST OF LEFT OVARY: Primary | ICD-10-CM

## 2020-11-09 PROCEDURE — 99215 OFFICE O/P EST HI 40 MIN: CPT | Performed by: OBSTETRICS & GYNECOLOGY

## 2020-11-09 RX ORDER — PHENAZOPYRIDINE HYDROCHLORIDE 100 MG/1
200 TABLET, FILM COATED ORAL ONCE
Status: CANCELLED | OUTPATIENT
Start: 2020-11-09 | End: 2020-11-09

## 2020-11-09 NOTE — PROGRESS NOTES
Left ovarian complex cyst  Desires sterilization     Ms. Laurie Diaz 39 year old P2 (hx of vaginal deliveries) presents for follow pelvic ultrasound results of persistent left ovarian cyst. She reports a 2 year history of left ovarian cyst but that between her most recent pelvic ultrasound and the one prior to that, that there has been a increase in size and complexity. This has made her worry a lot about the prospects of ovarian cancer. She expresses surgical intervention right away for that reason. She reports cramping over the weekend but other than that denies any localized pain in the pelvis. She does acknowledge perineal pain/soreness that she has had since the delivery of her first child in 2016. She inquires as to whether her complex ovarian cyst is the cause of this perineal pain and for that matter whether the perineal pain is a symptoms of ovarian cancer. She does endorse history of vaginal laceration she sustained from her vaginal deliveries, but she does not recall the degree of her vaginal lacerations. Otherwise, she denies bladder or bowel habit function issues. Has not been sexually active since the delivery of her last child in 10/2019 as she is  from her ; therefore, she can not comment on dyspareunia and postcoital bleeding.   Reports monthly interval menses and denies intermenstrual bleeding and dysmenorrhea.   She does express a desire for sterilization however and wonders if this can also be addressed at the same time as the surgery for her ovarian cyst.       Past Medical History:   Diagnosis Date     Anxiety      Hypothyroidism      Paresthesia        Past Surgical History:   Procedure Laterality Date     ORTHOPEDIC SURGERY  08/2015    [facility]irst surgery for torn ligaments       Current Outpatient Medications   Medication     levothyroxine (SYNTHROID/LEVOTHROID) 50 MCG tablet     Prenatal Vit-Fe Fumarate-FA (PRENATAL MULTIVITAMIN  PLUS IRON) 27-0.8 MG TABS     No current  facility-administered medications for this visit.        No Known Allergies    Social History     Tobacco Use     Smoking status: Never Smoker     Smokeless tobacco: Never Used   Substance Use Topics     Alcohol use: No     Alcohol/week: 0.0 standard drinks     Frequency: Never     Comment: infrequent     Drug use: No       Family History   Problem Relation Age of Onset     Hypothyroidism Mother      Thyroid Disease Mother      Hypertension Father      Heart Disease Father         heart attack     Diabetes Father      Unknown/Adopted Father        C: NEGATIVE for fever, chills, change in weight  HEENT: NEGATIVE for visual changes, runny nose, epistaxis, ear pain, tinnitus, tooth ache, sore throat, difficulty with swallowing, sinus pain  R: NEGATIVE for significant cough or SOB  CV: NEGATIVE for chest pain, palpitations or peripheral edema  BREAST: NEGATIVE For soreness, pain, lumps or nipple discharge  GI: NEGATIVE for nausea, abdominal pain, heartburn, or change in bowel habits  : NEGATIVE for frequency, dysuria, hematuria, vaginal discharge, or irregular bleeding  Neuro: NEGATIVE for numbness, tingling, focal weakness, or headache  INT: NEGATIVE for rashes, lesions or pruritis   PSYCH: NEGATIVE for anxiety, depression, or halluciinations    Exam:   My medical assistant, Vibha Pereira CMA, was present as a chaperone for entire clinic visit including the physical exam.  /78   Wt 80.3 kg (177 lb)   LMP 10/24/2020 (Approximate)   Breastfeeding No   BMI 29.45 kg/m    General Appearance: Well nourished, well developed female, NAD, AOx3  Neurological: Mental Status Normal and Station and Gait Normal   Skin: Normal skin turgor  HEET: Atraumatic, normocephalic, EOMI  Neck: Supple,no adenopathy,thyroid normal  Lungs: Good respiratory effort  Abdomen: Soft, NT, ND, no masses  Pelvic: Normal external female genitalia.  No external lesions, normal hair distribution, no adenopathy. Speculum exam reveals vaginal  "epithelium well rugated with no abnormal discharge. Cervix appears smooth, pink, with no visible lesions. Bimanual exam reveals normal size uterus, anteverted, non-tender, and mobile. No adnexal masses or tenderness. No cervical motion tenderness. No pelvic floor tenderness elicited.   Extremities: No clubbing, no cyanosis and no edema    Imagin) Pelvic US 2018  A left ovary measuring 4.3 x 3.9 x 2.9 cm with a simple cyst measuring 3.3 x 3.0 x 2.4 cm.  2) Pelvis US 2019  Left ovary:  3.5 x 3.3 x 2.8cm.  Simple cyst 3.2 x 2.6 x 2.9cm  3) Pelvic US 2020   Left ovary:   4.2 x 3.5 x 3.2 cm Complex cyst 2.8 x 3.7 x 2.3cm  4) Pelvic US 2020  Left ovary:   4.5 x 4.0 x 2.9 cm Complex cyst 3.9 x 3.3 x 3.5 cm  \"The left ovarian cyst is slightly more complex and has increased in size versus the prior exam. This has the appearance of a possible dermoid cyst.\"    A/P:   1) Left ovarian complex cyst  -- reviewed ultrasound images and report with patient  -- discussed limitations/low predictive value with regards to screening modalities (ie pelvic ultrasound, CA-125) for ovarian cancer  -- assured patient that in all likelihood that her complex ovarian cyst is likely benign and as the ultrasound eludes to may be a dermoid cyst  -- with that said, and given that patient hx of persistent ovarian that has evolved to a more complex character, it is reasonable to pursue surgery but also offered option for further observation with birth control therapy to assess for resolution, to which patient declined that option   -- therefore, surgery proposed was robotic assisted laparoscopic cystectomy, possible oophorectomy for persistent uncontrolled bleeding; risks reviewed including but not limited to bleeding, infection, need for blood transfusion, injury to surrounding organs (ie bowel/intestines, bladder, ureters, major blood vessels and nerves)., which if injured, then will be identified and repaired at time of " surgery or will be repaired by surgeons that specialize in those areas. Also reviewed unintended injuries going unnoticed at the time of surgery, and that would require additional surgery to be done to have them repaired.   -- patient conveyed understanding of risks and agrees to proceed  -- patient was instructed on coordinating preoperative clearance and pre-COVID testing prior to procedure     2) Desire for sterilization   -- Risks, benefits, and alternatives of the procedure were discussed with the patient. Risks include but are not limited to: bleeding, infection, damage to other organs, and the need for future surgeries. The irreversible nature of the surgery as well as the risk of regret were also addressed. The patient was also counseled regarding the small risk of failure, and the increased risk of ectopic gestation if pregnancy were to occur. Alternate methods of contraception were also reviewed, and the patient elected to proceed with surgery.   -- proposed surgery would be robotic assisted laparoscopic bilateral salpingectomy to be performed at the same time as her cystectomy mentioned above  -- will verify the need for federal tubal papers to be signed based on whether she is state funded medical insurance coverage     Total time spent was 40 minutes; greater than 50% of time was spent in counseling and/or coordination of care for the above listed diagnoses, not including time spent on the procedure.    Rosalba Pierre MD  [facility]

## 2020-11-10 ENCOUNTER — OFFICE VISIT (OUTPATIENT)
Dept: FAMILY MEDICINE | Facility: CLINIC | Age: 40
End: 2020-11-10
Payer: COMMERCIAL

## 2020-11-10 ENCOUNTER — TELEPHONE (OUTPATIENT)
Dept: OBGYN | Facility: CLINIC | Age: 40
End: 2020-11-10

## 2020-11-10 VITALS
TEMPERATURE: 97.1 F | HEIGHT: 65 IN | BODY MASS INDEX: 29.49 KG/M2 | DIASTOLIC BLOOD PRESSURE: 80 MMHG | OXYGEN SATURATION: 99 % | WEIGHT: 177 LBS | HEART RATE: 89 BPM | SYSTOLIC BLOOD PRESSURE: 130 MMHG

## 2020-11-10 DIAGNOSIS — Z11.59 ENCOUNTER FOR SCREENING FOR OTHER VIRAL DISEASES: Primary | ICD-10-CM

## 2020-11-10 DIAGNOSIS — Z01.818 PREOP GENERAL PHYSICAL EXAM: Primary | ICD-10-CM

## 2020-11-10 DIAGNOSIS — E03.9 ACQUIRED HYPOTHYROIDISM: ICD-10-CM

## 2020-11-10 DIAGNOSIS — Z30.2 REQUEST FOR STERILIZATION: ICD-10-CM

## 2020-11-10 DIAGNOSIS — N83.292 COMPLEX CYST OF LEFT OVARY: ICD-10-CM

## 2020-11-10 PROBLEM — F41.9 ANXIETY: Status: RESOLVED | Noted: 2018-09-19 | Resolved: 2020-11-10

## 2020-11-10 PROBLEM — K62.5 RECTAL BLEEDING: Status: RESOLVED | Noted: 2019-05-06 | Resolved: 2020-11-10

## 2020-11-10 LAB — HCG UR QL: NEGATIVE

## 2020-11-10 PROCEDURE — 99214 OFFICE O/P EST MOD 30 MIN: CPT | Performed by: FAMILY MEDICINE

## 2020-11-10 PROCEDURE — 81025 URINE PREGNANCY TEST: CPT | Performed by: FAMILY MEDICINE

## 2020-11-10 SDOH — ECONOMIC STABILITY: FOOD INSECURITY: WITHIN THE PAST 12 MONTHS, THE FOOD YOU BOUGHT JUST DIDN'T LAST AND YOU DIDN'T HAVE MONEY TO GET MORE.: NOT ASKED

## 2020-11-10 SDOH — ECONOMIC STABILITY: INCOME INSECURITY: HOW HARD IS IT FOR YOU TO PAY FOR THE VERY BASICS LIKE FOOD, HOUSING, MEDICAL CARE, AND HEATING?: NOT ASKED

## 2020-11-10 SDOH — ECONOMIC STABILITY: TRANSPORTATION INSECURITY
IN THE PAST 12 MONTHS, HAS THE LACK OF TRANSPORTATION KEPT YOU FROM MEDICAL APPOINTMENTS OR FROM GETTING MEDICATIONS?: NOT ASKED

## 2020-11-10 SDOH — ECONOMIC STABILITY: TRANSPORTATION INSECURITY
IN THE PAST 12 MONTHS, HAS LACK OF TRANSPORTATION KEPT YOU FROM MEETINGS, WORK, OR FROM GETTING THINGS NEEDED FOR DAILY LIVING?: NOT ASKED

## 2020-11-10 SDOH — ECONOMIC STABILITY: FOOD INSECURITY: WITHIN THE PAST 12 MONTHS, YOU WORRIED THAT YOUR FOOD WOULD RUN OUT BEFORE YOU GOT MONEY TO BUY MORE.: NOT ASKED

## 2020-11-10 ASSESSMENT — MIFFLIN-ST. JEOR: SCORE: 1488.75

## 2020-11-10 NOTE — PROGRESS NOTES
[address] [address]  Phone: [phone]  Primary Provider: Fortino Peña [place]  Pre-op Performing Provider: JOSE D JUAREZ    PREOPERATIVE EVALUATION:  Today's date: 11/10/2020    Laurie Diaz is a 39 year old female who presents for a preoperative evaluation.    Surgical Information:  Surgery/Procedure: Robotic assisted laparoscopic left ovarian cystectomy, bilateral salpingectomy, possible left oophorectomy  Surgery Location: [facility]  Surgeon: Dr Pierre  Surgery Date:11/24/2020  Time of Surgery: 11:00 AM  Where patient plans to recover: At home with family  Fax number for surgical facility: Note does not need to be faxed, will be available electronically in Epic.    Type of Anesthesia Anticipated: General    Subjective     HPI related to upcoming procedure: left ovarian cyst noted during pregnancy. No pain       Preop Questions 11/10/2020   1. Have you ever had a heart attack or stroke? No   2. Have you ever had surgery on your heart or blood vessels, such as a stent placement, a coronary artery bypass, or surgery on an artery in your head, neck, heart, or legs? No   3. Do you have chest pain with activity? No   4. Do you have a history of  heart failure? No   5. Do you currently have a cold, bronchitis or symptoms of other infection? No   6. Do you have a cough, shortness of breath, or wheezing? No   7. Do you or anyone in your family have previous history of blood clots? No   8. Do you or does anyone in your family have a serious bleeding problem such as prolonged bleeding following surgeries or cuts? No   9. Have you ever had problems with anemia or been told to take iron pills? No   10. Have you had any abnormal blood loss such as black, tarry or bloody stools, or abnormal vaginal bleeding? No   11. Have you ever had a blood transfusion? No   12. Are you willing to have a blood transfusion if it is medically needed  before, during, or after your surgery? Yes   13. Have you or any of your relatives ever had problems with anesthesia? YES - nausea sick   14. Do you have sleep apnea, excessive snoring or daytime drowsiness? No   15. Do you have any artifical heart valves or other implanted medical devices like a pacemaker, defibrillator, or continuous glucose monitor? No   16. Do you have artificial joints? No   17. Are you allergic to latex? No   18. Is there any chance that you may be pregnant? No     Health Care Directive:  Patient does not have a Health Care Directive or Living Will: none    Preoperative Review of :   reviewed - no record of controlled substances prescribed.      Review of Systems  Constitutional, neuro, ENT, endocrine, pulmonary, cardiac, gastrointestinal, genitourinary, musculoskeletal, integument and psychiatric systems are negative, except as otherwise noted.    Patient Active Problem List    Diagnosis Date Noted     Complex cyst of left ovary 11/10/2020     Priority: Medium     Added automatically from request for surgery [id number]       Request for sterilization 11/10/2020     Priority: Medium     Added automatically from request for surgery [id number]       Vaginal delivery 10/10/2019     Priority: Medium     Rectal bleeding - resolved, likely secondary to fissure 05/06/2019     Priority: Medium     Anxiety 09/19/2018     Priority: Medium     Acquired hypothyroidism 04/19/2016     Priority: Medium     TSH 3/8/2016  2.59  Taking synthroid dose; 25 mcg  6/21/2016 TSH 2.36  7/2019 NL2.14  9/2019 NL 2.28        Past Medical History:   Diagnosis Date     Anxiety      Hypothyroidism      Paresthesia      Past Surgical History:   Procedure Laterality Date     ORTHOPEDIC SURGERY  08/2015    Wirst surgery for torn ligaments     Current Outpatient Medications   Medication Sig Dispense Refill     levothyroxine (SYNTHROID/LEVOTHROID) 50 MCG tablet Take 1 tablet (50 mcg) by mouth daily 90 tablet 2     Prenatal  Vit-Fe Fumarate-FA (PRENATAL MULTIVITAMIN  PLUS IRON) 27-0.8 MG TABS Take 1 tablet by mouth daily         No Known Allergies     Social History     Tobacco Use     Smoking status: Never Smoker     Smokeless tobacco: Never Used   Substance Use Topics     Alcohol use: No     Alcohol/week: 0.0 standard drinks     Frequency: Never     Comment: infrequent       History   Drug Use No         Objective     LMP 10/24/2020 (Approximate)     Physical Exam    GENERAL APPEARANCE: healthy, alert and no distress     EYES: EOMI, PERRL     HENT: ear canals and TM's normal and nose and mouth without ulcers or lesions     NECK: no adenopathy, no asymmetry, masses, or scars and thyroid normal to palpation     RESP: lungs clear to auscultation - no rales, rhonchi or wheezes     CV: regular rates and rhythm, normal S1 S2, no S3 or S4 and no murmur, click or rub     ABDOMEN:  soft, nontender, no HSM or masses and bowel sounds normal     MS: extremities normal- no gross deformities noted, no evidence of inflammation in joints, FROM in all extremities.     SKIN: no suspicious lesions or rashes     NEURO: Normal strength and tone, sensory exam grossly normal, mentation intact and speech normal     PSYCH: mentation appears normal. and affect normal/bright     LYMPHATICS: No cervical adenopathy    Recent Labs   Lab Test 10/11/19  0701 10/10/19  0925 09/17/19  1637 11/26/18  1615 11/26/18  1615   HGB 12.5 12.4 12.0   < > 13.3   PLT  --  237 224   < > 288   NA  --   --   --   --  139   POTASSIUM  --   --   --   --  3.9   CR  --   --   --   --  0.52    < > = values in this interval not displayed.      TSH   Date Value Ref Range Status   08/03/2020 2.25 0.40 - 4.00 mU/L Final       Results for orders placed or performed in visit on 11/10/20   HCG Qual, Urine ([id number])     Status: None   Result Value Ref Range    HCG Qual Urine Negative NEG^Negative        Diagnostics:    No EKG required, no history of coronary heart disease, significant  arrhythmia, peripheral arterial disease or other structural heart disease.    Revised Cardiac Risk Index (RCRI):  The patient has the following serious cardiovascular risks for perioperative complications:   - No serious cardiac risks = 0 points     RCRI Interpretation: 0 points: Class I (very low risk - 0.4% complication rate)       Assessment & Plan   The proposed surgical procedure is considered INTERMEDIATE risk.    Laurie was seen today for pre-op exam.    Diagnoses and all orders for this visit:    Preop general physical exam  -     HCG Qual, Urine ([id number])    Complex cyst of left ovary    Request for sterilization    Acquired hypothyroidism      Risks and Recommendations:  The patient has the following additional risks and recommendations for perioperative complications:    Medication Instructions:  Patient is to take all scheduled medications on the day of surgery    RECOMMENDATION:  APPROVAL GIVEN to proceed with proposed procedure, without further diagnostic evaluation.    Signed Electronically by: Matt Sanabria MD    Copy of this evaluation report is provided to requesting physician.    Preop [facility] Preop Guidelines    Revised Cardiac Risk Index

## 2020-11-10 NOTE — PATIENT INSTRUCTIONS

## 2020-11-10 NOTE — TELEPHONE ENCOUNTER
Type of surgery: robotic assisted laparoscopic left ovarian cystectomy, bilateral salpingectomy, possible left oophorectomy  Location of surgery: [facility]  Date and time of surgery: 11/24/20 @ 11:00 am  Surgeon: Dr. Navarrete  Pre-Op Appt Date: Patient advised to schedule.  Post-Op Appt Date: 12/24/20   Packet sent out: Yes  Pre-cert/Authorization completed:  No  Date: 11/10/20

## 2020-11-16 ENCOUNTER — MYC MEDICAL ADVICE (OUTPATIENT)
Dept: FAMILY MEDICINE | Facility: CLINIC | Age: 40
End: 2020-11-16

## 2020-11-16 DIAGNOSIS — Z11.1 SCREENING FOR TUBERCULOSIS: Primary | ICD-10-CM

## 2020-11-17 NOTE — TELEPHONE ENCOUNTER
Please see my chart message below     Please review and advise     Thank you     Azul Frausto RN, BSN  [facility]

## 2020-11-18 DIAGNOSIS — Z11.1 SCREENING FOR TUBERCULOSIS: ICD-10-CM

## 2020-11-18 PROCEDURE — 86481 TB AG RESPONSE T-CELL SUSP: CPT | Performed by: FAMILY MEDICINE

## 2020-11-18 PROCEDURE — 36415 COLL VENOUS BLD VENIPUNCTURE: CPT | Performed by: FAMILY MEDICINE

## 2020-11-19 ENCOUNTER — NURSE TRIAGE (OUTPATIENT)
Dept: NURSING | Facility: CLINIC | Age: 40
End: 2020-11-19

## 2020-11-19 ENCOUNTER — VIRTUAL VISIT (OUTPATIENT)
Dept: FAMILY MEDICINE | Facility: OTHER | Age: 40
End: 2020-11-19

## 2020-11-19 NOTE — PROGRESS NOTES
"Date: 2020 14:32:57  Clinician: Ricardo Chaidez  Clinician NPI: [id number]  Patient: Laurie Diaz  Patient : 1980  Patient Address: [address] [place] [address] [place] [address]  Patient Phone: [phone]  Visit Protocol: URI  Patient Summary:  Laurie is a 39 year old ( : 1980 ) female who initiated a [facility] Visit for COVID-19 (Coronavirus) evaluation and screening. When asked the question \"Please sign me up to receive news, health information and promotions from [facility].\", Laurie responded \"No\".    When asked when her symptoms started, Laurie reported that she does not have any symptoms.   She denies taking antibiotic medication in the past month and having recent facial or sinus surgery in the past 60 days.    Pertinent COVID-19 (Coronavirus) information  Laurie does not work or volunteer as healthcare worker or a . In the past 14 days, Laurie has not worked or volunteered at a healthcare facility or group living setting.   In the past 14 days, she also has not lived in a congregate living setting.   Laurie has not had a close contact with a laboratory-confirmed COVID-19 patient within the last 14 days.    Since 2019, Laurie has been tested for COVID-19 and has not had upper respiratory infection or influenza-like illness.      Result of COVID-19 test: Negative     Date of her COVID-19 test: 2020      Pertinent medical history  Laurie does not get yeast infections when she takes antibiotics.   Laurie does not need a return to work/school note.   Weight: 177 lbs   Laurie does not smoke or use smokeless tobacco.   She denies pregnancy and denies breastfeeding. She is currently menstruating.   Weight: 177 lbs    MEDICATIONS: levothyroxine oral, ALLERGIES: NKDA  Clinician Response:  Dear Laurie,   Based on your exposure to COVID-19 (coronavirus), we would like to test you for this virus.  1. Please call [phone] to schedule your visit. Explain that you were referred by [facility] to " have a COVID-19 test. Be ready to share your [facility] visit ID number.  * If you need to schedule in [facility] please call [phone] or for [employer] [place] employees please call [phone].   * If you need to schedule in the [place] area please call [phone]. [employer] employees call [phone].   The following will serve as your written order for this COVID Test, ordered by me, for the indication of suspected COVID [Z20.828]: The test will be ordered in [facility], our electronic health record, after you are scheduled. It will show as ordered and authorized by Wil Joe MD.  Order: COVID-19 (coronavirus) PCR for ASYMPTOMATIC EXPOSURE testing from [facility].   If you know you have had close contact with someone who tested positive, you should be quarantined for 14 days after this exposure. You should stay in quarantine for the14 days even if the covid test is negative, the optimal time to test after exposure is 5-7 days from the exposure  Quarantine means   What should I do?  For safety, it's very important to follow these rules. Do this for 14 days after the date you were last exposed to the virus..  Stay home and away from others. Don't go to school or anywhere else. Generally quarantine means staying home from work but there are some exceptions to this. Please contact your workplace.   No hugging, kissing or shaking hands.  Don't let anyone visit.  Cover your mouth and nose with a mask, tissue or washcloth to avoid spreading germs.  Wash your hands and face often. Use soap and water.  What are the symptoms of COVID-19?  The most common symptoms are cough, fever and trouble breathing. Less common symptoms include headache, body aches, fatigue (feeling very tired), chills, sore throat, stuffy or runny nose, diarrhea (loose poop), loss of taste or smell, belly pain, and nausea or vomiting (feeling sick to your stomach or throwing up).  After 14 days, if you have still don't have symptoms, you likely don't have this  virus.  If you develop symptoms, follow these guidelines.  If you're normally healthy: Please start another [facility] visit to report your symptoms. Go to [email or web address].  If you have a serious health problem (like cancer, heart failure, an organ transplant or kidney disease): Call your specialty clinic. Let them know that you might have COVID-19.  2. When it's time for your COVID test:  Stay at least 6 feet away from others. (If someone will drive you to your test, stay in the backseat, as far away from the  as you can.)  Cover your mouth and nose with a mask, tissue or washcloth.  Go straight to the testing site. Don't make any stops on the way there or back.  Please note  Caregivers in these groups are at risk for severe illness due to COVID-19:  o People 65 years and older  o People who live in a nursing home or long-term care facility  o People with chronic disease (lung, heart, cancer, diabetes, kidney, liver, immunologic)  o People who have a weakened immune system, including those who:  Are in cancer treatment  Take medicine that weakens the immune system, such as corticosteroids  Had a bone marrow or organ transplant  Have an immune deficiency  Have poorly controlled HIV or AIDS  Are obese (body mass index of 40 or higher)  Smoke regularly  Where can I get more information?  [facility] -- About COVID-19: [email or web address]  CDC -- What to Do If You're Sick: www.cdc.gov/coronavirus/2019-ncov/about/steps-when-sick.html  CDC -- Ending Home Isolation: www.cdc.gov/coronavirus/2019-ncov/hcp/disposition-in-home-patients.html  CDC -- Caring for Someone: www.cdc.gov/coronavirus/2019-ncov/if-you-are-sick/care-for-someone.html  [facility] -- Interim Guidance for Hospital Discharge to Home: www.health.[facility].[place].us/diseases/coronavirus/hcp/hospdischarge.pdf  [facility] clinical trials (COVID-19 research studies): clinicalaffairs.[facility]/n-clinical-trials  Below are the COVID-19 hotlines at the  [place] Department of Health ([facility]). Interpreters are available.  For health questions: Call 757-489-4170 or 1-912.994.7445 (7 a.m. to 7 p.m.)  For questions about schools and childcare: Call 733-637-3682 or 1-478.214.9824 (7 a.m. to 7 p.m.)    Diagnosis: Contact with and (suspected) exposure to other viral communicable diseases  Diagnosis ICD: Z20.828

## 2020-11-19 NOTE — TELEPHONE ENCOUNTER
Patient says she was w/ someone on Nov 10th and this person tested positive on the 16th.  Patient says person was not sick at the time and patient was farther than 6ft from her.  Patient says she has a procedure on Tues and is testing tomorrow.  FNA advised she does not meet the guideline for close exposure but she will find out if she is positive after her COVID-19 test.  Patient verbalized understanding.      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 recommended personal protective equipment (i.e., a respirator-N95 mask, eye protection, gloves, and gown) AND [3] NO symptoms    Negative: [1] No COVID-19 EXPOSURE BUT [2] living with someone who was exposed and who has no symptoms of COVID-19    Negative: [1] COVID-19 EXPOSURE AND [2] 15 or more days ago AND [3] NO symptoms    Negative: [1] COVID-19 EXPOSURE (Close Contact) AND [2] within last 14 days BUT [3] NO symptoms    Negative: [1] Travel from area with community spread (identified by CDC) AND [2] within last 14 days BUT [3] NO symptoms    Negative: [1] Living in area with community spread (identified by local PHD) BUT [2] NO symptoms    Negative: COVID-19 has been diagnosed by a healthcare provider (HCP)    Negative: COVID-19 lab test positive    Negative: [1] Symptoms of COVID-19 (e.g., cough, fever, SOB, or others) AND [2] lives in an area with community spread    Negative: [1] Symptoms of COVID-19 (e.g., cough, fever, SOB, or others) AND [2] within 14 days of EXPOSURE (close contact) with diagnosed or suspected COVID-19 patient    Negative: [1] Symptoms of  COVID-19 (e.g., cough, fever, SOB, or others) AND [2] within 14 days of travel from high-risk area for COVID-19 community spread (identified by CDC)    Negative: [1] Difficulty breathing (shortness of breath) occurs AND [2] onset > 14 days after COVID-19 EXPOSURE (Close Contact) AND [3] no community spread where patient lives    Negative: [1] Dry cough occurs AND [2] onset > 14 days after COVID-19 EXPOSURE AND [3] no community spread where patient lives    Negative: [1] Wet cough (i.e., white-yellow, yellow, green, or marlo colored sputum) AND [2] onset > 14 days after COVID-19 EXPOSURE AND [3] no community spread where patient lives    Negative: [1] Common cold symptoms AND [2] onset > 14 days after COVID-19 EXPOSURE AND [3] no community spread where patient lives    Protocols used: CORONAVIRUS (COVID-19) EXPOSURE-A- 8.4.20

## 2020-11-20 DIAGNOSIS — Z11.59 ENCOUNTER FOR SCREENING FOR OTHER VIRAL DISEASES: ICD-10-CM

## 2020-11-20 LAB
GAMMA INTERFERON BACKGROUND BLD IA-ACNC: 0.09 IU/ML
M TB IFN-G CD4+ BCKGRND COR BLD-ACNC: 9.91 IU/ML
M TB TUBERC IFN-G BLD QL: NEGATIVE
MITOGEN IGNF BCKGRD COR BLD-ACNC: 0.06 IU/ML
MITOGEN IGNF BCKGRD COR BLD-ACNC: 0.09 IU/ML

## 2020-11-20 PROCEDURE — U0003 INFECTIOUS AGENT DETECTION BY NUCLEIC ACID (DNA OR RNA); SEVERE ACUTE RESPIRATORY SYNDROME CORONAVIRUS 2 (SARS-COV-2) (CORONAVIRUS DISEASE [COVID-19]), AMPLIFIED PROBE TECHNIQUE, MAKING USE OF HIGH THROUGHPUT TECHNOLOGIES AS DESCRIBED BY CMS-2020-01-R: HCPCS | Performed by: OBSTETRICS & GYNECOLOGY

## 2020-11-20 NOTE — RESULT ENCOUNTER NOTE
Dear Laurie,    Here is a summary of your recent test results:  -No signs of tuberculosis.     For additional lab test information, labtestsonline.org is an excellent reference.           Thank you very much for trusting me and [facility].     Have a peaceful day.    Healthy regards,  Shon Sanabria MD

## 2020-11-21 LAB
SARS-COV-2 RNA SPEC QL NAA+PROBE: NOT DETECTED
SPECIMEN SOURCE: NORMAL

## 2020-11-23 ENCOUNTER — ANESTHESIA EVENT (OUTPATIENT)
Dept: SURGERY | Facility: CLINIC | Age: 40
End: 2020-11-23
Payer: COMMERCIAL

## 2020-11-24 ENCOUNTER — HOSPITAL ENCOUNTER (OUTPATIENT)
Facility: CLINIC | Age: 40
Discharge: HOME | End: 2020-11-24
Attending: OBSTETRICS & GYNECOLOGY | Admitting: OBSTETRICS & GYNECOLOGY
Payer: COMMERCIAL

## 2020-11-24 ENCOUNTER — ANESTHESIA (OUTPATIENT)
Dept: SURGERY | Facility: CLINIC | Age: 40
End: 2020-11-24
Payer: COMMERCIAL

## 2020-11-24 VITALS
RESPIRATION RATE: 16 BRPM | HEART RATE: 57 BPM | HEIGHT: 65 IN | WEIGHT: 172.4 LBS | DIASTOLIC BLOOD PRESSURE: 75 MMHG | OXYGEN SATURATION: 99 % | TEMPERATURE: 98 F | BODY MASS INDEX: 28.72 KG/M2 | SYSTOLIC BLOOD PRESSURE: 107 MMHG

## 2020-11-24 DIAGNOSIS — N83.292 COMPLEX CYST OF LEFT OVARY: ICD-10-CM

## 2020-11-24 DIAGNOSIS — Z87.42 S/P OVARIAN CYSTECTOMY: ICD-10-CM

## 2020-11-24 DIAGNOSIS — Z90.79 STATUS POST BILATERAL SALPINGECTOMY: Primary | ICD-10-CM

## 2020-11-24 DIAGNOSIS — Z98.890 S/P OVARIAN CYSTECTOMY: ICD-10-CM

## 2020-11-24 DIAGNOSIS — Z30.2 REQUEST FOR STERILIZATION: ICD-10-CM

## 2020-11-24 LAB — HCG UR QL: NEGATIVE

## 2020-11-24 PROCEDURE — 58661 LAPAROSCOPY REMOVE ADNEXA: CPT | Mod: 51 | Performed by: OBSTETRICS & GYNECOLOGY

## 2020-11-24 PROCEDURE — 370N000002 HC ANESTHESIA TECHNICAL FEE, EACH ADDTL 15 MIN: Performed by: OBSTETRICS & GYNECOLOGY

## 2020-11-24 PROCEDURE — 258N000003 HC RX IP 258 OP 636: Performed by: ANESTHESIOLOGY

## 2020-11-24 PROCEDURE — 370N000001 HC ANESTHESIA TECHNICAL FEE, 1ST 30 MIN: Performed by: OBSTETRICS & GYNECOLOGY

## 2020-11-24 PROCEDURE — 250N000009 HC RX 250: Performed by: ANESTHESIOLOGY

## 2020-11-24 PROCEDURE — 88302 TISSUE EXAM BY PATHOLOGIST: CPT | Mod: 26 | Performed by: PATHOLOGY

## 2020-11-24 PROCEDURE — 360N000068 HC SURGERY LEVEL 8 1ST 30 MIN: Performed by: OBSTETRICS & GYNECOLOGY

## 2020-11-24 PROCEDURE — 88304 TISSUE EXAM BY PATHOLOGIST: CPT | Mod: TC | Performed by: OBSTETRICS & GYNECOLOGY

## 2020-11-24 PROCEDURE — 88305 TISSUE EXAM BY PATHOLOGIST: CPT | Mod: TC | Performed by: OBSTETRICS & GYNECOLOGY

## 2020-11-24 PROCEDURE — 360N000069 HC SURGERY LEVEL 8 EA 15 ADDTL MIN: Performed by: OBSTETRICS & GYNECOLOGY

## 2020-11-24 PROCEDURE — 250N000011 HC RX IP 250 OP 636: Performed by: NURSE ANESTHETIST, CERTIFIED REGISTERED

## 2020-11-24 PROCEDURE — 250N000011 HC RX IP 250 OP 636: Performed by: ANESTHESIOLOGY

## 2020-11-24 PROCEDURE — 272N000001 HC OR GENERAL SUPPLY STERILE: Performed by: OBSTETRICS & GYNECOLOGY

## 2020-11-24 PROCEDURE — 999N000136 HC STATISTIC PRE PROC ASSESS II: Performed by: OBSTETRICS & GYNECOLOGY

## 2020-11-24 PROCEDURE — 58662 LAPAROSCOPY EXCISE LESIONS: CPT | Performed by: OBSTETRICS & GYNECOLOGY

## 2020-11-24 PROCEDURE — 761N000001 HC RECOVERY PHASE 1 LEVEL 1 FIRST HR: Performed by: OBSTETRICS & GYNECOLOGY

## 2020-11-24 PROCEDURE — 250N000009 HC RX 250: Performed by: OBSTETRICS & GYNECOLOGY

## 2020-11-24 PROCEDURE — 761N000007 HC RECOVERY PHASE 2 EACH 15 MINS: Performed by: OBSTETRICS & GYNECOLOGY

## 2020-11-24 PROCEDURE — 250N000009 HC RX 250: Performed by: NURSE ANESTHETIST, CERTIFIED REGISTERED

## 2020-11-24 PROCEDURE — S2900 ROBOTIC SURGICAL SYSTEM: HCPCS | Performed by: OBSTETRICS & GYNECOLOGY

## 2020-11-24 PROCEDURE — 81025 URINE PREGNANCY TEST: CPT | Performed by: ANESTHESIOLOGY

## 2020-11-24 PROCEDURE — 250N000011 HC RX IP 250 OP 636: Performed by: OBSTETRICS & GYNECOLOGY

## 2020-11-24 PROCEDURE — 761N000002 HC RECOVERY PHASE 1 LEVEL 1 EA ADDTL HR: Performed by: OBSTETRICS & GYNECOLOGY

## 2020-11-24 PROCEDURE — 272N000004 HC RX 272: Performed by: OBSTETRICS & GYNECOLOGY

## 2020-11-24 PROCEDURE — 250N000013 HC RX MED GY IP 250 OP 250 PS 637: Performed by: OBSTETRICS & GYNECOLOGY

## 2020-11-24 PROCEDURE — 88305 TISSUE EXAM BY PATHOLOGIST: CPT | Mod: 26 | Performed by: PATHOLOGY

## 2020-11-24 RX ORDER — FENTANYL CITRATE 50 UG/ML
25-50 INJECTION, SOLUTION INTRAMUSCULAR; INTRAVENOUS
Status: DISCONTINUED | OUTPATIENT
Start: 2020-11-24 | End: 2020-11-24 | Stop reason: HOSPADM

## 2020-11-24 RX ORDER — SODIUM CHLORIDE, SODIUM LACTATE, POTASSIUM CHLORIDE, CALCIUM CHLORIDE 600; 310; 30; 20 MG/100ML; MG/100ML; MG/100ML; MG/100ML
INJECTION, SOLUTION INTRAVENOUS CONTINUOUS
Status: DISCONTINUED | OUTPATIENT
Start: 2020-11-24 | End: 2020-11-24 | Stop reason: HOSPADM

## 2020-11-24 RX ORDER — GLYCOPYRROLATE 0.2 MG/ML
INJECTION, SOLUTION INTRAMUSCULAR; INTRAVENOUS PRN
Status: DISCONTINUED | OUTPATIENT
Start: 2020-11-24 | End: 2020-11-24

## 2020-11-24 RX ORDER — AMOXICILLIN 250 MG
2 CAPSULE ORAL 2 TIMES DAILY
Qty: 60 TABLET | Refills: 1 | Status: SHIPPED | OUTPATIENT
Start: 2020-11-24 | End: 2021-01-06

## 2020-11-24 RX ORDER — LIDOCAINE 40 MG/G
CREAM TOPICAL
Status: DISCONTINUED | OUTPATIENT
Start: 2020-11-24 | End: 2020-11-24 | Stop reason: HOSPADM

## 2020-11-24 RX ORDER — LIDOCAINE HYDROCHLORIDE 10 MG/ML
INJECTION, SOLUTION INFILTRATION; PERINEURAL PRN
Status: DISCONTINUED | OUTPATIENT
Start: 2020-11-24 | End: 2020-11-24

## 2020-11-24 RX ORDER — PROPOFOL 10 MG/ML
INJECTION, EMULSION INTRAVENOUS PRN
Status: DISCONTINUED | OUTPATIENT
Start: 2020-11-24 | End: 2020-11-24

## 2020-11-24 RX ORDER — LABETALOL 20 MG/4 ML (5 MG/ML) INTRAVENOUS SYRINGE
PRN
Status: DISCONTINUED | OUTPATIENT
Start: 2020-11-24 | End: 2020-11-24

## 2020-11-24 RX ORDER — IBUPROFEN 600 MG/1
600 TABLET, FILM COATED ORAL
Status: DISCONTINUED | OUTPATIENT
Start: 2020-11-24 | End: 2020-11-24 | Stop reason: HOSPADM

## 2020-11-24 RX ORDER — NEOSTIGMINE METHYLSULFATE 1 MG/ML
VIAL (ML) INJECTION PRN
Status: DISCONTINUED | OUTPATIENT
Start: 2020-11-24 | End: 2020-11-24

## 2020-11-24 RX ORDER — NALOXONE HYDROCHLORIDE 0.4 MG/ML
0.4 INJECTION, SOLUTION INTRAMUSCULAR; INTRAVENOUS; SUBCUTANEOUS
Status: DISCONTINUED | OUTPATIENT
Start: 2020-11-24 | End: 2020-11-24 | Stop reason: HOSPADM

## 2020-11-24 RX ORDER — BUPIVACAINE HYDROCHLORIDE 5 MG/ML
INJECTION, SOLUTION EPIDURAL; INTRACAUDAL PRN
Status: DISCONTINUED | OUTPATIENT
Start: 2020-11-24 | End: 2020-11-24 | Stop reason: HOSPADM

## 2020-11-24 RX ORDER — MEPERIDINE HYDROCHLORIDE 25 MG/ML
12.5 INJECTION INTRAMUSCULAR; INTRAVENOUS; SUBCUTANEOUS
Status: DISCONTINUED | OUTPATIENT
Start: 2020-11-24 | End: 2020-11-24 | Stop reason: HOSPADM

## 2020-11-24 RX ORDER — CEFAZOLIN SODIUM 1 G/3ML
1 INJECTION, POWDER, FOR SOLUTION INTRAMUSCULAR; INTRAVENOUS SEE ADMIN INSTRUCTIONS
Status: DISCONTINUED | OUTPATIENT
Start: 2020-11-24 | End: 2020-11-24 | Stop reason: HOSPADM

## 2020-11-24 RX ORDER — ONDANSETRON 2 MG/ML
INJECTION INTRAMUSCULAR; INTRAVENOUS PRN
Status: DISCONTINUED | OUTPATIENT
Start: 2020-11-24 | End: 2020-11-24

## 2020-11-24 RX ORDER — SCOLOPAMINE TRANSDERMAL SYSTEM 1 MG/1
1 PATCH, EXTENDED RELEASE TRANSDERMAL ONCE
Status: DISCONTINUED | OUTPATIENT
Start: 2020-11-24 | End: 2020-11-24 | Stop reason: HOSPADM

## 2020-11-24 RX ORDER — ONDANSETRON 4 MG/1
4-8 TABLET, ORALLY DISINTEGRATING ORAL EVERY 8 HOURS PRN
Qty: 4 TABLET | Refills: 0 | Status: SHIPPED | OUTPATIENT
Start: 2020-11-24 | End: 2021-01-06

## 2020-11-24 RX ORDER — PROPOFOL 10 MG/ML
INJECTION, EMULSION INTRAVENOUS CONTINUOUS PRN
Status: DISCONTINUED | OUTPATIENT
Start: 2020-11-24 | End: 2020-11-24

## 2020-11-24 RX ORDER — ONDANSETRON 2 MG/ML
4 INJECTION INTRAMUSCULAR; INTRAVENOUS EVERY 30 MIN PRN
Status: DISCONTINUED | OUTPATIENT
Start: 2020-11-24 | End: 2020-11-24 | Stop reason: HOSPADM

## 2020-11-24 RX ORDER — NALOXONE HYDROCHLORIDE 0.4 MG/ML
0.2 INJECTION, SOLUTION INTRAMUSCULAR; INTRAVENOUS; SUBCUTANEOUS
Status: DISCONTINUED | OUTPATIENT
Start: 2020-11-24 | End: 2020-11-24 | Stop reason: HOSPADM

## 2020-11-24 RX ORDER — HYDRALAZINE HYDROCHLORIDE 20 MG/ML
2.5-5 INJECTION INTRAMUSCULAR; INTRAVENOUS EVERY 10 MIN PRN
Status: DISCONTINUED | OUTPATIENT
Start: 2020-11-24 | End: 2020-11-24 | Stop reason: HOSPADM

## 2020-11-24 RX ORDER — OXYCODONE HYDROCHLORIDE 5 MG/1
5-10 TABLET ORAL
Status: COMPLETED | OUTPATIENT
Start: 2020-11-24 | End: 2020-11-24

## 2020-11-24 RX ORDER — OXYCODONE HYDROCHLORIDE 5 MG/1
5-10 TABLET ORAL EVERY 6 HOURS PRN
Qty: 16 TABLET | Refills: 0 | Status: SHIPPED | OUTPATIENT
Start: 2020-11-24 | End: 2021-01-06

## 2020-11-24 RX ORDER — FENTANYL CITRATE 50 UG/ML
INJECTION, SOLUTION INTRAMUSCULAR; INTRAVENOUS PRN
Status: DISCONTINUED | OUTPATIENT
Start: 2020-11-24 | End: 2020-11-24

## 2020-11-24 RX ORDER — KETOROLAC TROMETHAMINE 30 MG/ML
INJECTION, SOLUTION INTRAMUSCULAR; INTRAVENOUS PRN
Status: DISCONTINUED | OUTPATIENT
Start: 2020-11-24 | End: 2020-11-24

## 2020-11-24 RX ORDER — ACETAMINOPHEN 325 MG/1
650 TABLET ORAL EVERY 4 HOURS PRN
Qty: 50 TABLET | Refills: 0 | Status: SHIPPED | OUTPATIENT
Start: 2020-11-24 | End: 2021-01-06

## 2020-11-24 RX ORDER — PHENAZOPYRIDINE HYDROCHLORIDE 200 MG/1
200 TABLET, FILM COATED ORAL ONCE
Status: COMPLETED | OUTPATIENT
Start: 2020-11-24 | End: 2020-11-24

## 2020-11-24 RX ORDER — DEXAMETHASONE SODIUM PHOSPHATE 4 MG/ML
INJECTION, SOLUTION INTRA-ARTICULAR; INTRALESIONAL; INTRAMUSCULAR; INTRAVENOUS; SOFT TISSUE PRN
Status: DISCONTINUED | OUTPATIENT
Start: 2020-11-24 | End: 2020-11-24

## 2020-11-24 RX ORDER — CEFAZOLIN SODIUM 2 G/100ML
2 INJECTION, SOLUTION INTRAVENOUS
Status: COMPLETED | OUTPATIENT
Start: 2020-11-24 | End: 2020-11-24

## 2020-11-24 RX ORDER — IBUPROFEN 600 MG/1
600 TABLET, FILM COATED ORAL EVERY 6 HOURS PRN
Qty: 30 TABLET | Refills: 0 | Status: SHIPPED | OUTPATIENT
Start: 2020-11-24 | End: 2021-05-05 | Stop reason: DRUGHIGH

## 2020-11-24 RX ORDER — ONDANSETRON 4 MG/1
4 TABLET, ORALLY DISINTEGRATING ORAL EVERY 30 MIN PRN
Status: DISCONTINUED | OUTPATIENT
Start: 2020-11-24 | End: 2020-11-24 | Stop reason: HOSPADM

## 2020-11-24 RX ADMIN — MIDAZOLAM 2 MG: 1 INJECTION INTRAMUSCULAR; INTRAVENOUS at 11:32

## 2020-11-24 RX ADMIN — SCOPALAMINE 1 PATCH: 1 PATCH, EXTENDED RELEASE TRANSDERMAL at 10:42

## 2020-11-24 RX ADMIN — OXYCODONE HYDROCHLORIDE 5 MG: 5 TABLET ORAL at 14:00

## 2020-11-24 RX ADMIN — ROCURONIUM BROMIDE 40 MG: 10 INJECTION INTRAVENOUS at 11:36

## 2020-11-24 RX ADMIN — PROPOFOL 75 MCG/KG/MIN: 10 INJECTION, EMULSION INTRAVENOUS at 11:44

## 2020-11-24 RX ADMIN — Medication 3 MG: at 13:09

## 2020-11-24 RX ADMIN — DEXAMETHASONE SODIUM PHOSPHATE 4 MG: 4 INJECTION, SOLUTION INTRA-ARTICULAR; INTRALESIONAL; INTRAMUSCULAR; INTRAVENOUS; SOFT TISSUE at 11:36

## 2020-11-24 RX ADMIN — PROPOFOL 160 MG: 10 INJECTION, EMULSION INTRAVENOUS at 11:36

## 2020-11-24 RX ADMIN — LIDOCAINE HYDROCHLORIDE 40 MG: 10 INJECTION, SOLUTION INFILTRATION; PERINEURAL at 11:36

## 2020-11-24 RX ADMIN — CEFAZOLIN SODIUM 2 G: 2 INJECTION, SOLUTION INTRAVENOUS at 11:40

## 2020-11-24 RX ADMIN — FENTANYL CITRATE 100 MCG: 50 INJECTION, SOLUTION INTRAMUSCULAR; INTRAVENOUS at 11:36

## 2020-11-24 RX ADMIN — ONDANSETRON HYDROCHLORIDE 4 MG: 2 INJECTION, SOLUTION INTRAVENOUS at 13:05

## 2020-11-24 RX ADMIN — HYDROMORPHONE HYDROCHLORIDE 0.5 MG: 1 INJECTION, SOLUTION INTRAMUSCULAR; INTRAVENOUS; SUBCUTANEOUS at 11:58

## 2020-11-24 RX ADMIN — LABETALOL 20 MG/4 ML (5 MG/ML) INTRAVENOUS SYRINGE 10 MG: at 12:17

## 2020-11-24 RX ADMIN — KETOROLAC TROMETHAMINE 30 MG: 30 INJECTION, SOLUTION INTRAMUSCULAR at 13:05

## 2020-11-24 RX ADMIN — GLYCOPYRROLATE 0.2 MG: 0.2 INJECTION, SOLUTION INTRAMUSCULAR; INTRAVENOUS at 11:36

## 2020-11-24 RX ADMIN — SODIUM CHLORIDE, POTASSIUM CHLORIDE, SODIUM LACTATE AND CALCIUM CHLORIDE: 600; 310; 30; 20 INJECTION, SOLUTION INTRAVENOUS at 11:00

## 2020-11-24 RX ADMIN — FENTANYL CITRATE 50 MCG: 50 INJECTION, SOLUTION INTRAMUSCULAR; INTRAVENOUS at 13:55

## 2020-11-24 RX ADMIN — ROCURONIUM BROMIDE 10 MG: 10 INJECTION INTRAVENOUS at 12:28

## 2020-11-24 RX ADMIN — GLYCOPYRROLATE 0.4 MG: 0.2 INJECTION, SOLUTION INTRAMUSCULAR; INTRAVENOUS at 13:09

## 2020-11-24 RX ADMIN — PHENAZOPYRIDINE 200 MG: 200 TABLET ORAL at 10:43

## 2020-11-24 RX ADMIN — SODIUM CHLORIDE, POTASSIUM CHLORIDE, SODIUM LACTATE AND CALCIUM CHLORIDE: 600; 310; 30; 20 INJECTION, SOLUTION INTRAVENOUS at 13:10

## 2020-11-24 RX ADMIN — HYDROMORPHONE HYDROCHLORIDE 0.5 MG: 1 INJECTION, SOLUTION INTRAMUSCULAR; INTRAVENOUS; SUBCUTANEOUS at 12:07

## 2020-11-24 ASSESSMENT — MIFFLIN-ST. JEOR: SCORE: 1467.88

## 2020-11-24 NOTE — BRIEF OP NOTE
[facility]   Brief Op Note    Patient Name:Laurie Diaz  MRN: [id number]  YOB: 1980  Surgery Date: 11/24/2020    Surgeon: Rosalba Pierre MD  Assistant: Karen De Los Santos CFA, who assisted with docking of robotic arms, retraction, ad closure of skin incisions      Pre-operative Diagnosis: 1) Left complex ovarian cyst 2) Desiring sterilization   Post-operative Diagnosis: Same   Procedure: Robotic assisted left ovarian cystectomy, biateral salpingectomy     Anesthesia: General   FRA Score: 1    EBL: 10 mL   IV fluids: 1000 mL crystalloids  Urine Output: Refer to anesthesia records; but appeared clear yellow at the end of the procedure    Complications: None  Specimen: 1) Left ovarian cyst 2) Bilateral fallopian tubes  Drains: Hutchinson catheter    Findings: Exam under anesthesia revealed 8 week size retroverted uterus without masses. No adnexal masses palpated. Laparoscopy revealed normal appearing uterus, bilateral fallopian tubes and right ovary. Left ovary contained a 2-3 cm ovarian cyst that was excised. Cyst was inadvertently ruptured during excision which lead to drainage of mucinous discharge. Ovarian raw area was made hemostatic with cautery and application of flow seal.   No intraperitoneal adhesions seen. Bilateral ureters were seen vermiculating at the retroperitoneum.     Technique: To follow with full operative note    Rosalba Pierre MD  [facility]

## 2020-11-24 NOTE — ANESTHESIA PREPROCEDURE EVALUATION
Anesthesia Pre-Procedure Evaluation    Patient: Laurie Diaz   MRN: [id number] : 1980          Preoperative Diagnosis: Complex cyst of left ovary [N83.292]  Request for sterilization [Z30.2]    Procedure(s):  Robotic assisted laparoscopic left ovarian cystectomy, bilateral salpingectomy, possible left oophorectomy    Past Medical History:   Diagnosis Date     Anxiety 2018     Complication of anesthesia      Hypothyroidism      Motion sickness      Paresthesia      PONV (postoperative nausea and vomiting)      Rectal bleeding - resolved, likely secondary to fissure 2019     Past Surgical History:   Procedure Laterality Date     WRIST SURGERY  2015    Wrist surgery for torn ligaments     Anesthesia Evaluation     . Pt has had prior anesthetic.     History of anesthetic complications   - PONV        ROS/MED HX    ENT/Pulmonary:  - neg pulmonary ROS     Neurologic:  - neg neurologic ROS     Cardiovascular:  - neg cardiovascular ROS       METS/Exercise Tolerance:     Hematologic:  - neg hematologic  ROS       Musculoskeletal:  - neg musculoskeletal ROS       GI/Hepatic:  - neg GI/hepatic ROS       Renal/Genitourinary:     (+) Other Renal/ Genitourinary, ovar cyst, BTL      Endo:     (+) thyroid problem hypothyroidism, .      Psychiatric:     (+) psychiatric history anxiety      Infectious Disease:  - neg infectious disease ROS       Malignancy:         Other:                          Physical Exam  Normal systems: cardiovascular and pulmonary    Airway   Mallampati: II  TM distance: >3 FB  Neck ROM: full    Dental     Cardiovascular       Pulmonary             Lab Results   Component Value Date    WBC 12.3 (H) 2019    HGB 12.5 10/11/2019    HCT 36.0 2019     10/10/2019     2018    POTASSIUM 3.9 2018    CHLORIDE 107 2018    CO2 25 2018    BUN 8 2018    CR 0.52 2018    GLC 88 2018    LAURA 8.9 2018    ALBUMIN 4.2 2018     "PROTTOTAL 7.3 11/26/2018    ALT 18 11/26/2018    AST 17 11/26/2018    ALKPHOS 57 11/26/2018    BILITOTAL 0.9 11/26/2018    TSH 2.25 08/03/2020    HCG Negative 11/10/2020       Preop Vitals  BP Readings from Last 3 Encounters:   11/10/20 130/80   11/09/20 118/78   08/03/20 116/76    Pulse Readings from Last 3 Encounters:   11/10/20 89   11/18/19 74   10/11/19 82      Resp Readings from Last 3 Encounters:   10/11/19 16   04/12/19 16   02/01/19 16    SpO2 Readings from Last 3 Encounters:   11/10/20 99%   10/11/19 97%   04/12/19 100%      Temp Readings from Last 1 Encounters:   11/10/20 97.1  F (36.2  C) (Tympanic)    Ht Readings from Last 1 Encounters:   11/10/20 1.651 m (5' 5\")      Wt Readings from Last 1 Encounters:   11/10/20 80.3 kg (177 lb)    Estimated body mass index is 29.45 kg/m  as calculated from the following:    Height as of 11/10/20: 1.651 m (5' 5\").    Weight as of 11/10/20: 80.3 kg (177 lb).       Anesthesia Plan      History & Physical Review  History and physical reviewed and following examination; no interval change.    ASA Status:  2 .    NPO Status:  > 8 hours    Plan for General with Intravenous and Propofol induction. Maintenance will be Balanced.    PONV prophylaxis:  Ondansetron (or other 5HT-3) and Dexamethasone or Solumedrol         Postoperative Care  Postoperative pain management:  IV analgesics.      Consents  Anesthetic plan, risks, benefits and alternatives discussed with:  Patient.  Use of blood products discussed: Yes.   .                 Tao Smith MD                    .  "

## 2020-11-24 NOTE — DISCHARGE INSTRUCTIONS
"DR. MARLIN LOWE M.D.                CLINIC PHONE NUMBER:  [phone]  [facility]    HAD IV \"IBUPROFEN\". NEXT POSSIBLE DOSE AT 7 PM'  MAXIMUM IBUPROFEN IN 24 HOURS IS 3,000 MG   MAXIMUM ACETAMINOPHEN  IN 24 HOURS IS 4,000 MG    GENERAL ANESTHESIA OR SEDATION ADULT DISCHARGE INSTRUCTIONS   SPECIAL PRECAUTIONS FOR 24 HOURS AFTER SURGERY    IT IS NOT UNUSUAL TO FEEL LIGHT-HEADED OR FAINT, UP TO 24 HOURS AFTER SURGERY OR WHILE TAKING PAIN MEDICATION.  IF YOU HAVE THESE SYMPTOMS; SIT FOR A FEW MINUTES BEFORE STANDING AND HAVE SOMEONE ASSIST YOU WHEN YOU GET UP TO WALK OR USE THE BATHROOM.    YOU SHOULD REST AND RELAX FOR THE NEXT 24 HOURS AND YOU MUST MAKE ARRANGEMENTS TO HAVE SOMEONE STAY WITH YOU FOR AT LEAST 24 HOURS AFTER YOUR DISCHARGE.  AVOID HAZARDOUS AND STRENUOUS ACTIVITIES.  DO NOT MAKE IMPORTANT DECISIONS FOR 24 HOURS.    DO NOT DRIVE ANY VEHICLE OR OPERATE MECHANICAL EQUIPMENT FOR 24 HOURS FOLLOWING THE END OF YOUR SURGERY.  EVEN THOUGH YOU MAY FEEL NORMAL, YOUR REACTIONS MAY BE AFFECTED BY THE MEDICATION YOU HAVE RECEIVED.    DO NOT DRINK ALCOHOLIC BEVERAGES FOR 24 HOURS FOLLOWING YOUR SURGERY.    DRINK CLEAR LIQUIDS (APPLE JUICE, GINGER ALE, 7-UP, BROTH, ETC.).  PROGRESS TO YOUR REGULAR DIET AS YOU FEEL ABLE.    YOU MAY HAVE A DRY MOUTH, A SORE THROAT, MUSCLES ACHES OR TROUBLE SLEEPING.  THESE SHOULD GO AWAY AFTER 24 HOURS.    CALL YOUR DOCTOR FOR ANY OF THE FOLLOWING:  SIGNS OF INFECTION (FEVER, GROWING TENDERNESS AT THE SURGERY SITE, A LARGE AMOUNT OF DRAINAGE OR BLEEDING, SEVERE PAIN, FOUL-SMELLING DRAINAGE, REDNESS OR SWELLING.    IT HAS BEEN OVER 8 TO 10 HOURS SINCE SURGERY AND YOU ARE STILL NOT ABLE TO URINATE (PASS WATER).     LAPAROSCOPY DISCHARGE INSTRUCTIONS    PLEASE RETURN TO THE CLINIC IN:  ____1 WEEK  ____2 WEEKS  ____4 WEEKS  ____6 WEEKS  MAKE THIS APPOINTMENT AFTER YOU GET HOME IF IT HAS NOT ALREADY BEEN SCHEDULED.    DO NOT DRIVE A CAR, DRINK ALCOHOL OR USE MACHINERY FOR " "THE NEXT 24 HOURS.  YOU SHOULD WAIT UNTIL YOU HAVE RECOVERED BEFORE MAKING ANY IMPORTANT DECISIONS.    PAIN  YOU MAY HAVE CRAMPS, SHOULDER PAIN OR A LOW BACKACHE FOR 24 TO 48 HOURS.  TYLENOL (ACETAMINOPHEN) OR MOTRIN (IBUPROFEN) MAY HELP, OR YOUR DOCTOR MAY GIVE YOU PAIN MEDICINE.  CALL YOUR DOCTOR IF PAIN CANNOT BE CONTROLLED.    BLEEDING OR VAGINAL DISCHARGE  YOU MAY HAVE SOME BLEEDING OR DISCHARGE FOR UP TO A WEEK OR LONGER.  DO NOT DOUCHE, USE TAMPONS OR HAVE SEX (INTERCOURSE) FOR ______DAYS.  CALL YOUR DOCTOR IF YOU SOAK MORE THAN ONE MAXI PAD (SANITARY NAPKIN) PER HOUR, OR IF YOU PASS LARGE BLOOD CLOTS.    FEVER  YOU MAY HAVE A LOW FEVER FOR THE FIRST TWO DAYS.  CALL YOUR DOCTOR IF IT GOES OVER 101 DEGREES.    NAUSEA  IF YOU HAVE NAUSEA (FEEL SICK TO YOUR STOMACH), STAY IN BED.  TRY DRINKING A SMALL AMOUNT OF 7-UP, TEA OR SOUP.    SWOLLEN BELLY  IF YOUR ABDOMEN (BELLY AREA) FEELS FIRM OR SWOLLEN, CALL YOUR DOCTOR.    DIZZINESS AND WEAKNESS  YOU MAY FEEL DIZZY OR WEAK FOR A FEW DAYS.  IF SO, YOU SHOULD REST OFTEN, STAND UP SLOWLY AND USE CARE WHEN CLIMBING STAIRS.    DIET AND ACTIVITY  EAT LIGHT MEALS AND DRINK PLENTY OF FLUIDS FOR THE FIRST 24 HOURS (OR LONGER, IF YOU HAVE NAUSEA).  WAIT 5 DAYS BEFORE BATHING.  SHOWERS ARE OKAY.  MOST WOMEN CAN RETURN TO WORK AFTER 24 HOURS.  YOU MAY GO BACK TO YOUR OTHER ACTIVITIES AFTER YOUR PAIN GOES AWAY.            IF YOU HAVE STITCHES  YOU MAY REMOVE YOUR BANDAGE THE DAY AFTER TREATMENT.  YOUR DOCTOR WILL TELL YOU IF YOUR STITCHES NEED TO BE REMOVED.  SOME STITCHES DISSOLVE OVER TIME.           You had a medication before surgery that turns your urine orange. It stains. Wear a pad and or \"older panties\" . This will clear with time and drinking water.    Transderm Scop (Scopolamine) Patch    The Transderm Scop patch placed behind your ear helps to prevent nausea and vomiting associated with the use of anesthesia and certain analgesics used during or after many types " surgery.  You will need to remove this patch after 3 days.  However, it may be removed sooner if you are no longer concerned about nausea or vomiting.      The most common side effects:  ?  dryness of the mouth  ?  drowsiness  ?  blurred vision  ?  dilation of pupils  ?  disorientation, memory disturbances and/or confusion  ?  dizziness  ?  restlessness  ?  hallucinations  ?  difficulty urinating  ?  skin rash  ?  red or painful eyes    Avoid drinking alcohol while using Transderm Scop patch.  Be careful about driving or operating any machinery while using this medication since it may make you drowsy.  In the unlikely event that you experience pain in the eye, which may be accompanied by widening of the pupil, eye redness and blurred vision, remove the Transderm Scop Patch immediately and consult your doctor.    Removal of Transderm Scop Patch:  To remove the patch simply peel it off your skin.  Be sure to wash your hands and the area behind your ear thoroughly with soap and water.  The Transderm Scop Patch will continue to have some active ingredient after use.  Therefore, to avoid accidental contact or ingestion by children or pets, fold the used patch in half with the sticky side together and dispose in the trash out of reach of children and pets.  If you wear contact lens, be sure to wash your hands first before handling contact lens.      Removal date:________11/26/20 at the latest (3 days)______________.

## 2020-11-24 NOTE — ANESTHESIA POSTPROCEDURE EVALUATION
Patient: Laurie Diaz    Procedure(s):  Robotic assisted laparoscopic left ovarian cystectomy, bilateral salpingectomy    Diagnosis:Complex cyst of left ovary [N83.292]  Request for sterilization [Z30.2]  Diagnosis Additional Information: No value filed.    Anesthesia Type:  General    Note:  Anesthesia Post Evaluation    Patient location during evaluation: PACU  Patient participation: Able to fully participate in evaluation  Level of consciousness: awake  Pain management: adequate  Airway patency: patent  Cardiovascular status: acceptable  Respiratory status: acceptable  Hydration status: acceptable  PONV: controlled     Anesthetic complications: None          Last vitals:  Vitals:    11/24/20 1445 11/24/20 1450 11/24/20 1500   BP: 108/64 109/65 110/46   Pulse: 57 59 57   Resp: 20 15 12   Temp:      SpO2: 98% 97% 96%         Electronically Signed By: Krunal Izaguirre DO  November 24, 2020  3:25 PM

## 2020-11-24 NOTE — ANESTHESIA CARE TRANSFER NOTE
Patient: Laurie Diaz    Procedure(s):  Robotic assisted laparoscopic left ovarian cystectomy, bilateral salpingectomy    Diagnosis: Complex cyst of left ovary [N83.292]  Request for sterilization [Z30.2]  Diagnosis Additional Information: No value filed.    Anesthesia Type:   General     Note:  Airway :Face Mask  Patient transferred to:PACU  Comments: Patient oral suctioned. Patient with spontaneous respirations and adequate tidal volumes. Patient awake and responsive. Extubated in OR to 6 L facemask. To PACU ventilating well. VSS. Report given.Handoff Report: Identifed the Patient, Identified the Reponsible Provider, Reviewed the pertinent medical history, Discussed the surgical course, Reviewed Intra-OP anesthesia mangement and issues during anesthesia, Set expectations for post-procedure period and Allowed opportunity for questions and acknowledgement of understanding      Vitals: (Last set prior to Anesthesia Care Transfer)    CRNA VITALS  11/24/2020 1252 - 11/24/2020 1330      11/24/2020             Resp Rate (observed):  (!) 3                Electronically Signed By: NIRAJ Casarez CRNA  November 24, 2020  1:30 PM

## 2020-11-24 NOTE — ANESTHESIA PROCEDURE NOTES
Airway   Date/Time: 11/24/2020 11:40 AM        Staff -   Anesthesiologist:  Tao Smith MD  CRNA: Fabrice Hightower APRN CRNA  Performed By: CRNA    Consent for Airway   Urgency: elective    Indications and Patient Condition  Indications for airway management: vu-procedural  Induction type:intravenousMask difficulty assessment: 1 - vent by mask    Final Airway Details  Final airway type: endotracheal airway  Successful airway:ETT - single  Endotracheal Airway Details   ETT size (mm): 7.0  Cuffed: yes  Cuff volume (mL): 5  Successful intubation technique: direct laryngoscopy  Grade View of Cords: 1  Adjucts: stylet  Measured from: lips  Secured at (cm): 21  Secured with: plastic tape  Bite block used: None    Post intubation assessment   Placement verified by: capnometry, equal breath sounds and chest rise   Number of attempts at approach: 1  Number of other approaches attempted: 0  Secured with:plastic tape  Ease of procedure: easy  Dentition: Intact and Unchanged

## 2020-11-25 LAB — COPATH REPORT: NORMAL

## 2020-11-27 NOTE — RESULT ENCOUNTER NOTE
Inform patient that her pathology returned benign. Her fallopian tubes are benign. Her ovarian cyst was in fact a dermoid cyst which is benign as well.   No evidence of malignancy or cancer.     Rosalba Pierre MD

## 2020-11-27 NOTE — OP NOTE
[facility]   Full Operative Note    Patient Name:Laurie Diaz  MRN: [id number]  YOB: 1980  Surgery Date: 11/24/2020    Surgeon: Rosalba Pierre MD  Assistant: Karen De Los Santos CFA, who assisted with docking of robotic arms, retraction, ad closure of skin incisions      Pre-operative Diagnosis: 1) Left complex ovarian cyst 2) Desiring sterilization   Post-operative Diagnosis: Same   Procedure: Robotic assisted left ovarian cystectomy, biateral salpingectomy     Anesthesia: General   FRA Score: 1    EBL: 10 mL   IV fluids: 1000 mL crystalloids  Urine Output: Refer to anesthesia records; but appeared clear yellow at the end of the procedure    Complications: None  Specimen: 1) Left ovarian cyst 2) Bilateral fallopian tubes  Drains: Hutchinson catheter    Findings: Exam under anesthesia revealed 8 week size retroverted uterus without masses. No adnexal masses palpated. Laparoscopy revealed normal appearing uterus, bilateral fallopian tubes and right ovary. Left ovary contained a 2-3 cm ovarian cyst that was excised. Cyst was inadvertently ruptured during excision which lead to drainage of mucinous discharge. Ovarian raw area was made hemostatic with cautery and application of flow seal.   No intraperitoneal adhesions seen. Bilateral ureters were seen vermiculating at the retroperitoneum.     Indication: Ms. Laurie Diaz 39 year old presented for history of persistent left ovarian cyst on serial pelvic ultrasounds. Incidentally, ultrasound description of her left ovarian cyst is suspicious for dermoid ovarian cyst. She has always been asymptomatic. The other finding on the serial pelvic ultrasound shows that the cyst is increasing in size and complexity. Given all of this and the patient's expressed anxiety over ovarian cancer, she elects for surgical intervention. She also expressed desire for sterilization. Therefore, proposed surgery above was recommended.   Risks of  surgery were discussed with patient including but not limited to bleeding, infection, need for blood transfusion, injury to surrounding organs (ie bowel/intestines, bladder, ureters, major blood vessels and nerves)., which if injured, then will be identified and repaired at time of surgery or will be repaired by surgeons that specialize in those areas. Also reviewed unintended injuries going unnoticed at the time of surgery, and that would require additional surgery to be done to have them repaired. Patient conveyed understanding of the risks and agreed to proceed. She signed the consent form.     Technique: After consent form was signed, she was brought to the operating room where she was placed under general anesthesia. She was positioned in the dorsal lithotomy position. She was prepped and draped in normal sterile fashion. A Hulka/Rhino uterine manipulator was inserted into the cervix and clamped on the cervix without difficulty, and  the tenaculum and speculum removed. Hutchinson catheter was inserted in the usual sterile fashion.     Attention was then turned to the abdomen where penetrating towel clips were applied to the umbilicus. The skin was infiltrated with 0.25% bupivacaine without epinephrine. The abdominal skin was tented and a Veress needle inserted into the umbilicus. Syringe withdrawal and saline drip were normal, and opening pressure was < 5 mmHg, and good insufflation of the abdomen was observed. Pneumoperitoneum was achieved with good tympany of the abdomen.     An 8 mm air seal assistant port  was placed along the right upper quadrant of the abdomen along the midclavicular line using the visa view laparoscopic scope with adequate entry into the peritoneum without injury. An 8 mm incision was made supraumbilical and the camera robot port was inserted under direct visualization within the abdomen. The first robot arm port was marked 10 cm right  lateral to the umbilicus, 0.25% bupivicaine injected, an  8 mm horizontal skin incision made and an 8 mm port was placed under direct visualization within the abdomen. The second robot port was placed in similar fashion 10 cm to the left lateral of the umbilicus. The robot was then docked to the patient's left side and all instruments and the camera placed into the abdomen through the ports. The following instruments were used to perform the surgery. At the right lateral robot arm the monopolar cautery scissors were used. At left lateral robot arm, the fenestrated bipolar cautery was used.      The abdomen was surveyed with the above findings.     Using the robot arm instruments mentioned above, the left fallopian tube was identified by following the tube all the way to its fimbriated end. The mesosalpinx along the distal side was clamped cauterized and cut and serially done along the mesosalpinx to the uterine cornua. The left fallopian tube was excised from the uterine cornua and the tube removed intact out of the assistant port to be sent for pathology. Similarly, the right fallopian tube was identified by following the tube all the way to its fimbriated end. The mesosalpinx along the distal side was clamped cauterized and cut and serially done along the mesosalpinx to the uterine cornua. The right fallopian tube was excised from the uterine cornua and the tube removed intact out of the assistant port to be sent for pathology.    Attention was then given to the left ovarian cyst. A linear incision was made on the ovarian capsule overlying the cyst using the monopolar cautery scissor. The incision was undermined to successfully identify a plane between the cyst wall and the ovarian capsule. The cyst wall was dissected off ovarian capsule. Just about when the cyst was about to be excised from the ovary, the cyst inadvertently ruptured, which resulted in expression of mucinous drainage that was scant. The cyst was placed in an endo catch bag and removed out from the  peritoneal cavity and sent for pathology. The pelvis was thoroughly irrigated with saline and suctioned to account for the inadvertent spillage of cyst mucinous fluid.      The raw area remaining on the ovary where the cyst used to be located was deemed hemostatic with cautery. Flow seal was applied to the area for hemostatic reinforcement. The cauterized edges of the mesosalpinx were also hemostatic. The rest of the pelvis showed no further pathology and hemostasis. The ureters were seen vermiculating in the retroperitoneum.     The robot arms were undocked.The carbon dioxide was then allowed to evacuate from the abdomen and the ports were removed under direct visualization. The pneumoperitoneum was expelled. The skin incisions were closed with 4-0 Vicryl  and dermabond.    The patient tolerated the procedure well. Sponges, laps and needle counts were good. Patient was taken to the recovery room in stable condition.     Rosalba Pierre MD  [facility]

## 2020-12-24 ENCOUNTER — OFFICE VISIT (OUTPATIENT)
Dept: OBGYN | Facility: CLINIC | Age: 40
End: 2020-12-24
Payer: COMMERCIAL

## 2020-12-24 VITALS
BODY MASS INDEX: 29.54 KG/M2 | SYSTOLIC BLOOD PRESSURE: 108 MMHG | WEIGHT: 177.5 LBS | HEART RATE: 73 BPM | DIASTOLIC BLOOD PRESSURE: 82 MMHG

## 2020-12-24 DIAGNOSIS — Z87.42 S/P OVARIAN CYSTECTOMY: ICD-10-CM

## 2020-12-24 DIAGNOSIS — Z48.89 POSTOPERATIVE VISIT: Primary | ICD-10-CM

## 2020-12-24 DIAGNOSIS — Z98.890 S/P OVARIAN CYSTECTOMY: ICD-10-CM

## 2020-12-24 DIAGNOSIS — Z86.018 HISTORY OF DERMOID CYST EXCISION: ICD-10-CM

## 2020-12-24 DIAGNOSIS — Z90.79 STATUS POST BILATERAL SALPINGECTOMY: ICD-10-CM

## 2020-12-24 DIAGNOSIS — M62.89 PELVIC FLOOR DYSFUNCTION: ICD-10-CM

## 2020-12-24 DIAGNOSIS — Z98.890 HISTORY OF DERMOID CYST EXCISION: ICD-10-CM

## 2020-12-24 PROCEDURE — 99024 POSTOP FOLLOW-UP VISIT: CPT | Performed by: OBSTETRICS & GYNECOLOGY

## 2020-12-24 RX ORDER — LEVONORGESTREL/ETHIN.ESTRADIOL 0.1-0.02MG
1 TABLET ORAL DAILY
Qty: 84 TABLET | Refills: 3 | Status: SHIPPED | OUTPATIENT
Start: 2020-12-24 | End: 2021-05-05

## 2020-12-24 NOTE — NURSING NOTE
"Chief Complaint   Patient presents with     Surgical Followup     Davinci Salpingectomy   2020       Initial /82 (BP Location: Left arm, Cuff Size: Adult Regular)   Pulse 73   Wt 80.5 kg (177 lb 8 oz)   LMP 2020 (Exact Date)   Breastfeeding No   BMI 29.54 kg/m   Estimated body mass index is 29.54 kg/m  as calculated from the following:    Height as of 20: 1.651 m (5' 5\").    Weight as of this encounter: 80.5 kg (177 lb 8 oz).  BP completed using cuff size: regular    Questioned patient about current smoking habits.  Pt. has never smoked.          The following HM Due: NONE      Suri Wild, ANTHONY on 2020 at 8:18 AM         "

## 2020-12-24 NOTE — PROGRESS NOTES
Post-operative visit    Ms. Laurie Diaz 40 year old returns for postoperative visit.   She is s/p robotic assisted laparoscopic left ovarian cystectomy and bilateral salpingectomy for a persistent left ovarian cyst and desired sterilization.   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     Today, she reports doing well. Has mild brief episodes of pelvic cramping but nothing that is intolerable. No longer taking oral pain medications.   Reports normal bladder and bowel habit function.     Requests pelvic physical therapy for pelvic floor dysfunction that is causing other pelvic discomfort that she had prior to procedure    Exam:   My medical assistant, Suri Wild CMA, was present as a chaperone for entire clinic visit including the physical exam.  /82 (BP Location: Left arm, Cuff Size: Adult Regular)   Pulse 73   Wt 80.5 kg (177 lb 8 oz)   LMP 11/19/2020 (Exact Date)   Breastfeeding No   BMI 29.54 kg/m    General Appearance: Well nourished, well developed female, NAD, AOx3  Neurological: Mental Status Normal and Station and Gait Normal   Skin: Normal skin turgor  HEET: Atraumatic, normocephalic, EOMI  Neck: Supple,no adenopathy,thyroid normal  Lungs: Good respiratory effort  Abdomen: Soft, NT, ND, no masses. Incision are well healed.   Pelvic: deferred  Extremities: No clubbing, no cyanosis and no edema    A/P: Postoperative visit  -- reviewed pathology and operative findings   -- reviewed ongoing lifting restrictions, return to exercise precautions   -- all other questions and concerns addressed    Hx of dermoid cyst  -- reviewed that recurrence of this is low but that to prevent against it that she would need to take birth control   -- patient elects to take birth control for this reason; denies any relative or absolute contraindications to estrogen containing birth control methods (ie history of personal/fmaily hx of VTEs, smoking,  and migraine headaches with aura)    -- low dose combined OCP prescribed; side effect profile reviewed    Pelvic floor tenderness  -- pelvic physical therapy referral made     Rosalba Pierre MD  [facility]

## 2021-01-05 ENCOUNTER — THERAPY VISIT (OUTPATIENT)
Dept: PHYSICAL THERAPY | Facility: CLINIC | Age: 41
End: 2021-01-05
Attending: OBSTETRICS & GYNECOLOGY
Payer: COMMERCIAL

## 2021-01-05 DIAGNOSIS — M62.89 PELVIC FLOOR DYSFUNCTION: ICD-10-CM

## 2021-01-05 DIAGNOSIS — M62.81 MUSCLE WEAKNESS (GENERALIZED): ICD-10-CM

## 2021-01-05 PROCEDURE — 97110 THERAPEUTIC EXERCISES: CPT | Mod: GP | Performed by: PHYSICAL THERAPIST

## 2021-01-05 PROCEDURE — 97140 MANUAL THERAPY 1/> REGIONS: CPT | Mod: GP | Performed by: PHYSICAL THERAPIST

## 2021-01-05 PROCEDURE — 97535 SELF CARE MNGMENT TRAINING: CPT | Mod: GP | Performed by: PHYSICAL THERAPIST

## 2021-01-05 PROCEDURE — 97161 PT EVAL LOW COMPLEX 20 MIN: CPT | Mod: GP | Performed by: PHYSICAL THERAPIST

## 2021-01-05 NOTE — PROGRESS NOTES
[facility] Initial Evaluation  Subjective:    Therapist Generated HPI Evaluation  Problem details: Patient has chief complaint of constant rectal pressure and intermittent buttocks and posterior thigh burning pain. No urinary incontinence and no dyspareunia. History of rectal fissure October 2020. History of 2 pregnancies (children ages 1 and 4), hemorrhoids and urinary frequency since she was young. No bowel problems and no pain with defecation..                                    Patient Health History  Luarie Diaz being seen for pelvic floor.     Date of Onset: 2 years ago, increased recently again.   Problem occurred: unknown   Pain is reported as 2/10 on pain scale.  General health as reported by patient is good.  Pertinent medical history includes: thyroid problems and numbness/tingling.   Red flags:  None as reported by patient.     Surgeries include:  Orthopedic surgery and other. Other surgery history details: wrist; laparoscopic 6 weeks ago: ovarian cyst & fallopian tube removal & tubal ligation.    Current medications:  Thyroid medication.    Current occupation is teacher.   Primary job tasks include:  Computer work and lifting/carrying.                                    Objective:  System                                 Pelvic Dysfunction Evaluation:    Bladder/Pelvic Problems:  Rectal pressure          Diagnostic Tests:    Pelvic Exam:  Myofascial tenderness on left                      Flexibility:    Tightness present at:Piriformis  Tightness not present at:  Adductors; Iliopsoas; Hamstrings or Gluteals    Abdominal Wall:  normal        Pelvic Clock Exam:    Ischiocavernosis pain:  -  Bulbocavernosis pain:  -  Transverse Perineal:  -  Levator ANI:  +/-  Perineal Body:  -  SI Provocation:  normal        Reflex Testing:  normal    External Assessment:    Skin Condition:  Normal  Scars:  Well healed  Bearing Down/Coughing:  Cystourethrocele  Tissue Symmetry:  Normal  Introitus:   Normal  Muscle Contraction/Perineal Mobility:  Slight lift, no urogential triangle descent  Internal Assessment:  Internal assessment pelvic: 2+/5 strength; moderate tightness and tenderness left levator ani muscles.  Sensory Exam:  Normal  Contraction/Grade:  Weak squeeze, 2 second hold (2)    Accessory Muscle use-Gluteals:  Yes  Accessory Muscle use-Adductors:  Yes    SEMG Biofeedback:  NA                  Additional History:  Delivery History:  Vaginal delivery  Number of Pregnancies: 3 (one miscarriage)  Number of Live Births: 2                       General     ROS    Assessment/Plan:    Patient is a 40 year old female with pelvic complaints.    Patient has the following significant findings with corresponding treatment plan.                Diagnosis 1:  Pelvic floor dysfunction  Pain -  manual therapy and education  Decreased ROM/flexibility - self managementand therapeutic exercise  Decreased strength - therapeutic exercise, therapeutic activities and home program  Decreased function - therapeutic activities    Therapy Evaluation Codes:   1) History comprised of:   Personal factors that impact the plan of care:      None.    Comorbidity factors that impact the plan of care are:      None.     Medications impacting care: None.  2) Examination of Body Systems comprised of:   Body structures and functions that impact the plan of care:      Pelvis.   Activity limitations that impact the plan of care are:      Lifting, Frequency and Pelvic Exam.  3) Clinical presentation characteristics are:   Stable/Uncomplicated.  4) Decision-Making    Low complexity using standardized patient assessment instrument and/or measureable assessment of functional outcome.  Cumulative Therapy Evaluation is: Low complexity.    Previous and current functional limitations:  (See Goal Flow Sheet for this information)    Short term and Long term goals: (See Goal Flow Sheet for this information)     Communication ability:  Patient appears  to be able to clearly communicate and understand verbal and written communication and follow directions correctly.  Treatment Explanation - The following has been discussed with the patient:   RX ordered/plan of care  Anticipated outcomes  Possible risks and side effects  This patient would benefit from PT intervention to resume normal activities.   Rehab potential is excellent.    Frequency:  2 X a month, once daily  Duration:  for 3 months  Discharge Plan:  Achieve all LTG.  Independent in home treatment program.  Reach maximal therapeutic benefit.    Please refer to the daily flowsheet for treatment today, total treatment time and time spent performing 1:1 timed codes.

## 2021-01-06 ENCOUNTER — OFFICE VISIT (OUTPATIENT)
Dept: FAMILY MEDICINE | Facility: CLINIC | Age: 41
End: 2021-01-06
Payer: COMMERCIAL

## 2021-01-06 VITALS
BODY MASS INDEX: 29.72 KG/M2 | OXYGEN SATURATION: 100 % | SYSTOLIC BLOOD PRESSURE: 118 MMHG | HEART RATE: 75 BPM | DIASTOLIC BLOOD PRESSURE: 66 MMHG | HEIGHT: 65 IN | TEMPERATURE: 98.4 F | WEIGHT: 178.4 LBS

## 2021-01-06 DIAGNOSIS — R29.898 LOWER LIMB SYMPTOM: Primary | ICD-10-CM

## 2021-01-06 DIAGNOSIS — R19.8 RECTAL PRESSURE: ICD-10-CM

## 2021-01-06 LAB
CRP SERPL-MCNC: 5.7 MG/L (ref 0–8)
ERYTHROCYTE [DISTWIDTH] IN BLOOD BY AUTOMATED COUNT: 12.3 % (ref 10–15)
ERYTHROCYTE [SEDIMENTATION RATE] IN BLOOD BY WESTERGREN METHOD: 5 MM/H (ref 0–20)
HBA1C MFR BLD: 5.1 % (ref 0–5.6)
HCT VFR BLD AUTO: 41 % (ref 35–47)
HGB BLD-MCNC: 13.6 G/DL (ref 11.7–15.7)
MCH RBC QN AUTO: 30.3 PG (ref 26.5–33)
MCHC RBC AUTO-ENTMCNC: 33.2 G/DL (ref 31.5–36.5)
MCV RBC AUTO: 91 FL (ref 78–100)
PLATELET # BLD AUTO: 323 10E9/L (ref 150–450)
RBC # BLD AUTO: 4.49 10E12/L (ref 3.8–5.2)
VIT B12 SERPL-MCNC: 506 PG/ML (ref 193–986)
WBC # BLD AUTO: 9.5 10E9/L (ref 4–11)

## 2021-01-06 PROCEDURE — 99214 OFFICE O/P EST MOD 30 MIN: CPT | Performed by: FAMILY MEDICINE

## 2021-01-06 PROCEDURE — 82607 VITAMIN B-12: CPT | Performed by: FAMILY MEDICINE

## 2021-01-06 PROCEDURE — 36415 COLL VENOUS BLD VENIPUNCTURE: CPT | Performed by: FAMILY MEDICINE

## 2021-01-06 PROCEDURE — 83036 HEMOGLOBIN GLYCOSYLATED A1C: CPT | Performed by: FAMILY MEDICINE

## 2021-01-06 PROCEDURE — 80053 COMPREHEN METABOLIC PANEL: CPT | Performed by: FAMILY MEDICINE

## 2021-01-06 PROCEDURE — 86140 C-REACTIVE PROTEIN: CPT | Performed by: FAMILY MEDICINE

## 2021-01-06 PROCEDURE — 85027 COMPLETE CBC AUTOMATED: CPT | Performed by: FAMILY MEDICINE

## 2021-01-06 PROCEDURE — 84443 ASSAY THYROID STIM HORMONE: CPT | Performed by: FAMILY MEDICINE

## 2021-01-06 PROCEDURE — 85652 RBC SED RATE AUTOMATED: CPT | Performed by: FAMILY MEDICINE

## 2021-01-06 ASSESSMENT — MIFFLIN-ST. JEOR: SCORE: 1490.1

## 2021-01-06 NOTE — PROGRESS NOTES
Assessment & Plan     Lower limb symptom: bilateral posterior leg burning. Unclear etiology. Will check baseline labs, inflammatory markers. She does note some low back pain but not necessarily along the same time line as posterior thigh burning sensation. Consider MRI of lumbar spine to evaluate for possible radicular cause.   - Comprehensive metabolic panel (BMP + Alb, Alk Phos, ALT, AST, Total. Bili, TP)  - Vitamin B12  - CBC with platelets  - Hemoglobin A1c  - CRP, inflammation  - ESR: Erythrocyte sedimentation rate  - TSH with free T4 reflex    Rectal pressure: ? Related to pelvic floor dysfunction. Continue pelvic floor therapy.  - COLORECTAL SURGERY REFERRAL      No follow-ups on file.    Luciano Pedraza DO  [facility]     Laurie Diaz is a 40 year old who presents to clinic today for the following health issues:    HPI       Musculoskeletal problem/pain  Onset/Duration: x 2 month  Description  Location:  - Bilateral legs  Joint Swelling: no  Redness: no  Pain: burning sensation  Warmth: no  Intensity:  Severe - notices most at night  And improves by morning.   Progression of Symptoms:  intermittent  Accompanying signs and symptoms:   Fevers: no  Numbness/tingling/weakness: YES  History  Trauma to the area: no  Recent illness:  no  Previous similar problem: YES- 2 years ago  Previous evaluation:  YES  Precipitating or alleviating factors:  Aggravating factors include: none  Therapies tried and outcome: nothing      She hasn't noticed any association with posterior thigh burning sensation with activity or position change. She does note some low back pain that is described as sore and chronic. She stretches to help and it doesn't prevent her from doing anything.  Had it a couple years ago but was pregnant and ?attributed to that.        Concern - Rectal pressure  Onset: 2 month - started similar time period to the back of the leg burning.  Description: rectal  "pressure  Intensity: moderate  Progression of Symptoms:  intermittent  Accompanying Signs & Symptoms: none  Previous history of similar problem: Yes, 2 years ago  Precipitating factors:        Worsened by: none  Alleviating factors:        Improved by: none  Therapies tried and outcome:  none     No saddle anesthesia or incontinence of bowel or bladder. Feels like incomplete emptying and feels sore surrounding the anus. Bowel movements are soft and easily to pass. No hematochezia or melena.         Review of Systems   Constitutional, HEENT, cardiovascular, pulmonary, gi and gu systems are negative, except as otherwise noted.      Objective    /66 (BP Location: Right arm, Patient Position: Sitting, Cuff Size: Adult Large)   Pulse 75   Temp 98.4  F (36.9  C) (Tympanic)   Ht 1.651 m (5' 5\")   Wt 80.9 kg (178 lb 6.4 oz)   LMP 12/24/2020 (Exact Date)   SpO2 100%   BMI 29.69 kg/m    Body mass index is 29.69 kg/m .  Physical Exam   GENERAL: healthy, alert and no distress  MS: no gross musculoskeletal defects noted, no edema  NEURO: Normal strength and tone, mentation intact and speech normal  Comprehensive back pain exam:  No tenderness, Lower extremity strength functional and equal on both sides, Lower extremity reflexes within normal limits bilaterally, Lower extremity sensation normal and equal on both sides and Straight leg raise negative bilaterally          "

## 2021-01-07 ENCOUNTER — MYC MEDICAL ADVICE (OUTPATIENT)
Dept: OBGYN | Facility: CLINIC | Age: 41
End: 2021-01-07

## 2021-01-07 ENCOUNTER — MYC MEDICAL ADVICE (OUTPATIENT)
Dept: FAMILY MEDICINE | Facility: CLINIC | Age: 41
End: 2021-01-07

## 2021-01-07 DIAGNOSIS — R29.898 LOWER LIMB SYMPTOM: Primary | ICD-10-CM

## 2021-01-07 LAB
ALBUMIN SERPL-MCNC: 4 G/DL (ref 3.4–5)
ALP SERPL-CCNC: 83 U/L (ref 40–150)
ALT SERPL W P-5'-P-CCNC: 20 U/L (ref 0–50)
ANION GAP SERPL CALCULATED.3IONS-SCNC: 7 MMOL/L (ref 3–14)
AST SERPL W P-5'-P-CCNC: 12 U/L (ref 0–45)
BILIRUB SERPL-MCNC: 1.2 MG/DL (ref 0.2–1.3)
BUN SERPL-MCNC: 12 MG/DL (ref 7–30)
CALCIUM SERPL-MCNC: 9 MG/DL (ref 8.5–10.1)
CHLORIDE SERPL-SCNC: 109 MMOL/L (ref 94–109)
CO2 SERPL-SCNC: 22 MMOL/L (ref 20–32)
CREAT SERPL-MCNC: 0.46 MG/DL (ref 0.52–1.04)
GFR SERPL CREATININE-BSD FRML MDRD: >90 ML/MIN/{1.73_M2}
GLUCOSE SERPL-MCNC: 87 MG/DL (ref 70–99)
POTASSIUM SERPL-SCNC: 4.3 MMOL/L (ref 3.4–5.3)
PROT SERPL-MCNC: 7.3 G/DL (ref 6.8–8.8)
SODIUM SERPL-SCNC: 138 MMOL/L (ref 133–144)
TSH SERPL DL<=0.005 MIU/L-ACNC: 2.3 MU/L (ref 0.4–4)

## 2021-01-07 NOTE — TELEPHONE ENCOUNTER
Creatinine   Date Value Ref Range Status   01/06/2021 0.46 (L) 0.52 - 1.04 mg/dL Final     MyChart sent      Thalia Bland RN  [facility]

## 2021-01-19 ENCOUNTER — HOSPITAL ENCOUNTER (OUTPATIENT)
Dept: MRI IMAGING | Facility: CLINIC | Age: 41
Discharge: HOME | End: 2021-01-19
Attending: FAMILY MEDICINE | Admitting: FAMILY MEDICINE
Payer: COMMERCIAL

## 2021-01-19 DIAGNOSIS — R29.898 LOWER LIMB SYMPTOM: ICD-10-CM

## 2021-01-19 PROCEDURE — 72148 MRI LUMBAR SPINE W/O DYE: CPT

## 2021-01-21 ENCOUNTER — MYC MEDICAL ADVICE (OUTPATIENT)
Dept: FAMILY MEDICINE | Facility: CLINIC | Age: 41
End: 2021-01-21

## 2021-01-21 NOTE — TELEPHONE ENCOUNTER
Routing to PCP for further review/recommendations/orders.      Thalia Bland RN  [facility]

## 2021-01-26 ENCOUNTER — TELEPHONE (OUTPATIENT)
Dept: OBGYN | Facility: CLINIC | Age: 41
End: 2021-01-26

## 2021-01-26 ENCOUNTER — THERAPY VISIT (OUTPATIENT)
Dept: PHYSICAL THERAPY | Facility: CLINIC | Age: 41
End: 2021-01-26
Payer: COMMERCIAL

## 2021-01-26 DIAGNOSIS — M62.89 PELVIC FLOOR DYSFUNCTION: Primary | ICD-10-CM

## 2021-01-26 DIAGNOSIS — E03.9 ACQUIRED HYPOTHYROIDISM: ICD-10-CM

## 2021-01-26 DIAGNOSIS — M62.81 MUSCLE WEAKNESS (GENERALIZED): ICD-10-CM

## 2021-01-26 PROCEDURE — 97140 MANUAL THERAPY 1/> REGIONS: CPT | Mod: GP | Performed by: PHYSICAL THERAPIST

## 2021-01-26 PROCEDURE — 97110 THERAPEUTIC EXERCISES: CPT | Mod: GP | Performed by: PHYSICAL THERAPIST

## 2021-01-26 PROCEDURE — 97535 SELF CARE MNGMENT TRAINING: CPT | Mod: GP | Performed by: PHYSICAL THERAPIST

## 2021-01-26 RX ORDER — LEVOTHYROXINE SODIUM 50 UG/1
50 TABLET ORAL DAILY
Qty: 90 TABLET | Refills: 0 | Status: SHIPPED | OUTPATIENT
Start: 2021-01-26 | End: 2021-06-09

## 2021-01-26 NOTE — TELEPHONE ENCOUNTER
Received refill request for levothyroxine.Has upcoming appointment scheduled for 3/4/21.  Refilled x1.

## 2021-03-04 ENCOUNTER — VIRTUAL VISIT (OUTPATIENT)
Dept: OBGYN | Facility: CLINIC | Age: 41
End: 2021-03-04
Attending: OBSTETRICS & GYNECOLOGY
Payer: COMMERCIAL

## 2021-03-04 DIAGNOSIS — D27.9 DERMOID CYST OF OVARY, UNSPECIFIED LATERALITY: Primary | ICD-10-CM

## 2021-03-04 PROCEDURE — 99213 OFFICE O/P EST LOW 20 MIN: CPT | Mod: TEL | Performed by: OBSTETRICS & GYNECOLOGY

## 2021-03-04 NOTE — LETTER
"3/4/2021       RE: Laurie Diaz  [address] [id number]     Dear Colleague,    Thank you for referring your patient, Laurie Diaz, to the [facility] [place] [facility]. Please see a copy of my visit note below.    The patient has been notified of the following:      \"We have found that certain health care needs can be provided without the need for a face to face visit.  This service lets us provide the care you need with a phone conversation.       I will have full access to your [facility] medical record during this entire phone call.   I will be taking notes for your medical record.      Since this is like an office visit, we will bill your insurance company for this service.       There are potential benefits and risks of telephone visits (e.g. limits to patient confidentiality) that differ from in-person visits.?  Confidentiality still applies for telephone services, and nobody will record the visit.  It is important to be in a quiet, private space that is free of distractions (including cell phone or other devices) during the visit.??      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  She is hesitant to take because of concern of increase risk of breast cancer.  She has had a bilateral salpingectomy and has completed childbearing.  She has no family history of ovarian cancer, although her dad was adopted so family history on that side is limited.      Laurie took oral contraceptives in her teens/early 20s (for approximately 5 years).  Her menses are regular and not heavy or painful. She has breast " fed 2 children.    Past Medical History:   Diagnosis Date     Anxiety 09/19/2018     Complication of anesthesia      Hypothyroidism      Motion sickness      Paresthesia      PONV (postoperative nausea and vomiting)      Rectal bleeding - resolved, likely secondary to fissure 05/06/2019     Past Surgical History:   Procedure Laterality Date     DAVINCI SALPINGECTOMY Left 11/24/2020    Procedure: Robotic assisted laparoscopic left ovarian cystectomy, bilateral salpingectomy;  Surgeon: Rosalba Pierre MD;  Location: [facility] OR     WRIST SURGERY  08/2015    Wrist surgery for torn ligaments     Family History   Problem Relation Age of Onset     Hypothyroidism Mother      Thyroid Disease Mother      Hypertension Father      Heart Disease Father         heart attack     Diabetes Father      No Known Problems Sister      No Known Problems Daughter      No Known Problems Son      Physical exam:  None during phone visit  Psych: affect is bright    Assessment/Plan:  41 yo  with history of ovarian dermoid s/p cystectomy and bilateral salpingectomy here to discuss risks and benefits of oral contraceptives.    - reviewed with patient the role of combined OCPs in decreasing risk of ovarian cancer.  This is typically recommended in patient's with increased risk of epithelial ovarian cancer until risk reducing surgery is considered.  She does not have a increased risk of ovarian cancer.  Her history of an ovarian dermoid tumor does not increase her risk of cancer. We discussed that her history of bilateral salpingectomy and history of breastfeeding also diminish her risk of ovarian cancer.    - we discussed risks of ocp use.  Reassured patient that data on risk of breast cancer is not conclusive and is unlikely to be significant.  We discussed risk of DVT.     - Patient is overall not interested in starting OCPs.  Given our discussion I think this is reasonable.  She will let us know if she has any further  questions.    On the day of this encounter at least 25 minutes of time was spent in consultation with ftelephone discussion, review of historical information, documentation and post visit activities including communication with other providers and coordination of patient care.    Carmen Jewell MD

## 2021-03-04 NOTE — PROGRESS NOTES
"The patient has been notified of the following:      \"We have found that certain health care needs can be provided without the need for a face to face visit.  This service lets us provide the care you need with a phone conversation.       I will have full access to your [facility] medical record during this entire phone call.   I will be taking notes for your medical record.      Since this is like an office visit, we will bill your insurance company for this service.       There are potential benefits and risks of telephone visits (e.g. limits to patient confidentiality) that differ from in-person visits.?  Confidentiality still applies for telephone services, and nobody will record the visit.  It is important to be in a quiet, private space that is free of distractions (including cell phone or other devices) during the visit.??      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  She is hesitant to take because of concern of increase risk of breast cancer.  She has had a bilateral salpingectomy and has completed childbearing.  She has no family history of ovarian cancer, although her dad was adopted so family history on that side is limited.      Laurie took oral contraceptives in her teens/early 20s (for approximately 5 years).  Her menses are regular and not heavy or painful. She has breast fed 2 children.    Past Medical History:   Diagnosis Date     Anxiety 09/19/2018     Complication of anesthesia      Hypothyroidism      Motion sickness      Paresthesia      PONV (postoperative nausea and vomiting)      Rectal bleeding - resolved, likely secondary to fissure 05/06/2019     Past Surgical " History:   Procedure Laterality Date     DAVINCI SALPINGECTOMY Left 11/24/2020    Procedure: Robotic assisted laparoscopic left ovarian cystectomy, bilateral salpingectomy;  Surgeon: Rosalba Pierre MD;  Location: [facility] OR     WRIST SURGERY  08/2015    Wrist surgery for torn ligaments     Family History   Problem Relation Age of Onset     Hypothyroidism Mother      Thyroid Disease Mother      Hypertension Father      Heart Disease Father         heart attack     Diabetes Father      No Known Problems Sister      No Known Problems Daughter      No Known Problems Son      Physical exam:  None during phone visit  Psych: affect is bright    Assessment/Plan:  39 yo  with history of ovarian dermoid s/p cystectomy and bilateral salpingectomy here to discuss risks and benefits of oral contraceptives.    - reviewed with patient the role of combined OCPs in decreasing risk of ovarian cancer.  This is typically recommended in patient's with increased risk of epithelial ovarian cancer until risk reducing surgery is considered.  She does not have a increased risk of ovarian cancer.  Her history of an ovarian dermoid tumor does not increase her risk of cancer. We discussed that her history of bilateral salpingectomy and history of breastfeeding also diminish her risk of ovarian cancer.    - we discussed risks of ocp use.  Reassured patient that data on risk of breast cancer is not conclusive and is unlikely to be significant.  We discussed risk of DVT.     - Patient is overall not interested in starting OCPs.  Given our discussion I think this is reasonable.  She will let us know if she has any further questions.    On the day of this encounter at least 25 minutes of time was spent in consultation with ftelephone discussion, review of historical information, documentation and post visit activities including communication with other providers and coordination of patient care.    Carmen Jewell MD

## 2021-03-18 PROBLEM — M62.81 MUSCLE WEAKNESS (GENERALIZED): Status: RESOLVED | Noted: 2021-01-05 | Resolved: 2021-03-18

## 2021-03-18 NOTE — PROGRESS NOTES
Subjective:  HPI  Physical Exam                    Objective:  System    Physical Exam    General     ROS    Assessment/Plan:    DISCHARGE REPORT    Progress reporting period is from 1/5/2021 to 1/26/2021.     SUBJECTIVE    Subjective: Significant improvement with pelvic floor and radiating leg symptoms since initial session. Noticed that she was clenching her buttocks all throughout the day and symptoms resolved when she down trained them. Had an MRI which showed degenerative.   Current Pain level: 0/10   Initial Pain level: 2/10     OBJECTIVE    Objective: Reviewed pelvic floor exercises and given low back exercises on stability ball. Discussed bladder training techniques to decrease urinary frequency that she has had for years.     ASSESSMENT/PLAN    STG/LTGs have been met or progress has been made towards goals:  Yes (See Goal flow sheet completed today.)  Assessment of Progress: The patient's condition is improving.  The patient's condition has potential to improve.  Self Management Plans:  Patient is independent in a home treatment program.  Patient is independent in self management of symptoms.    Laurie continues to require the following intervention to meet STG and LTG's: PT intervention is no longer required to meet STG/LTG.    Recommendations:    This patient is ready to be discharged from therapy and continue their home treatment program.    Please refer to the daily flowsheet for treatment today, total treatment time and time spent performing 1:1 timed codes.

## 2021-03-24 NOTE — PATIENT INSTRUCTIONS
Recommendations:  1. Contact OB for guidance on possible thrush       Contact your Peds to let them know you are being treated for truch   2. Monitor breast milk volume to maintain 25-30 ounces per day  3. Gradually decrease frequency of pumping by 5 to 10 minutes every 3-4 days       Wait another 2 days before further decreasing  4. Length of time pumping usually is manageable at 10-15 minutes per session   5. Monitor your nipple size when continuing pumping as it may increase in size and you will need to change the breast shield size          Lactation Consultant: BEAU Akers, RN, IBCLC    [email or web address]     Total Patient Care Time: 30 minutes, of which >75% of time spent on breastfeeding counseling.  
[phone] and [phone]

## 2021-05-05 ENCOUNTER — OFFICE VISIT (OUTPATIENT)
Dept: FAMILY MEDICINE | Facility: CLINIC | Age: 41
End: 2021-05-05
Payer: COMMERCIAL

## 2021-05-05 VITALS
SYSTOLIC BLOOD PRESSURE: 120 MMHG | DIASTOLIC BLOOD PRESSURE: 74 MMHG | HEIGHT: 65 IN | OXYGEN SATURATION: 98 % | HEART RATE: 78 BPM | WEIGHT: 179 LBS | RESPIRATION RATE: 12 BRPM | TEMPERATURE: 98.6 F | BODY MASS INDEX: 29.82 KG/M2

## 2021-05-05 DIAGNOSIS — Z11.59 NEED FOR HEPATITIS C SCREENING TEST: ICD-10-CM

## 2021-05-05 DIAGNOSIS — J02.9 SORE THROAT: ICD-10-CM

## 2021-05-05 DIAGNOSIS — R49.8 VOCAL FATIGUE: ICD-10-CM

## 2021-05-05 LAB
DEPRECATED S PYO AG THROAT QL EIA: NEGATIVE
HCV AB SERPL QL IA: NONREACTIVE
SPECIMEN SOURCE: NORMAL
SPECIMEN SOURCE: NORMAL
STREP GROUP A PCR: NOT DETECTED

## 2021-05-05 PROCEDURE — 99N1174 PR STATISTIC STREP A RAPID: Performed by: FAMILY MEDICINE

## 2021-05-05 PROCEDURE — 99213 OFFICE O/P EST LOW 20 MIN: CPT | Performed by: FAMILY MEDICINE

## 2021-05-05 PROCEDURE — 86803 HEPATITIS C AB TEST: CPT | Performed by: FAMILY MEDICINE

## 2021-05-05 PROCEDURE — 87651 STREP A DNA AMP PROBE: CPT | Performed by: FAMILY MEDICINE

## 2021-05-05 PROCEDURE — 36415 COLL VENOUS BLD VENIPUNCTURE: CPT | Performed by: FAMILY MEDICINE

## 2021-05-05 RX ORDER — IBUPROFEN 200 MG
400 TABLET ORAL EVERY 4 HOURS PRN
COMMUNITY
Start: 2021-05-05 | End: 2023-01-26

## 2021-05-05 ASSESSMENT — MIFFLIN-ST. JEOR: SCORE: 1492.82

## 2021-05-05 NOTE — PATIENT INSTRUCTIONS
I think you may have muscle strain related to singing. I will review your lab studies via [facility]. If the follow up strep test is negative, I think the ENT specialist will need to be involved in your assessment.

## 2021-05-05 NOTE — PROGRESS NOTES
"    Assessment & Plan     Vocal fatigue  I encouraged patient to rest her voice is much as possible until she can follow-up with specialists.  - OTOLARYNGOLOGY REFERRAL    Need for hepatitis C screening test  Negative screening test.  - Hepatitis C Screen Reflex to HCV RNA Quant and Genotype  - Group A Streptococcus PCR Throat Swab    Sore throat    - Streptococcus A Rapid Scr w Reflx to PCR  - OTOLARYNGOLOGY REFERRAL        20 minutes spent on the date of the encounter doing chart review, history and exam, documentation and further activities per the note       BMI:   Estimated body mass index is 29.79 kg/m  as calculated from the following:    Height as of this encounter: 1.651 m (5' 5\").    Weight as of this encounter: 81.2 kg (179 lb).           Return in about 9 months (around 2/5/2022) for Routine preventive.    Reese Berrios DO  [facility] CHING Sullivan is a 40 year old who presents for the following health issues     HPI     Acute Illness  Acute illness concerns: sore throat  Onset/Duration: 1 month  Symptoms:  Fever: no  Chills/Sweats: no  Headache (location?): no  Sinus Pressure: no  Conjunctivitis:  no  Ear Pain: no  Rhinorrhea: no  Congestion: no  Sore Throat: YES  Cough: no  Wheeze: no  Decreased Appetite: no  Nausea: no  Vomiting: no  Diarrhea: no  Dysuria/Freq.: no  Dysuria or Hematuria: no  Fatigue/Achiness: no  Sick/Strep Exposure: no  Therapies tried and outcome: tea, trying not to sing in class past week, no caffeine      Review of Systems   Patient is seen for chief concern of middle throat discomfort.    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     She has no associated nasal congestion, or recent fever.         Objective    /74 (Cuff Size: Adult Regular)   Pulse 78   Temp 98.6  F (37  C) (Tympanic)   Resp 12   Ht 1.651 m (5' 5\")   Wt 81.2 kg (179 lb)   SpO2 98%   " BMI 29.79 kg/m    Body mass index is 29.79 kg/m .  Physical Exam   Vital signs reviewed.  Patient is in no acute appearing distress.  Breathing appears nonlabored.  Patient is alert and oriented ×3.  Patient is very pleasant, making good eye contact and responding with clear fluent speech.  She can phonate normally.    ENT: Ear exam shows bilateral tympanic membranes to be clear without injection, nasal turbinates show no injection or edema, no pharyngeal injection or exudate.    Neck exam: Patient has mild tenderness in bilateral submental area with no palpable abnormality.  No palpable or visible thyroid abnormality.  No lymphadenopathy.  Neck is supple.    Heart: Heart rate is regular without murmur.  Lungs: Lungs are clear to auscultation with good airflow bilaterally.    Results for orders placed or performed in visit on 05/05/21   Hepatitis C Screen Reflex to HCV RNA Quant and Genotype     Status: None   Result Value Ref Range    Hepatitis C Antibody Nonreactive NR^Nonreactive   Streptococcus A Rapid Scr w Reflx to PCR     Status: None    Specimen: Throat   Result Value Ref Range    Strep Specimen Description Throat     Streptococcus Group A Rapid Screen Negative NEG^Negative   Group A Streptococcus PCR Throat Swab     Status: None    Specimen: Throat   Result Value Ref Range    Specimen Description Throat     Strep Group A PCR Not Detected NDET^Not Detected   The negative rapid strep study was reviewed with patient at time of exam.    The negative follow-up strep screen and negative hepatitis C study was reviewed with patient via result note.

## 2021-06-07 NOTE — TELEPHONE ENCOUNTER
Refill request received for levothyroxine. Last annual exam 11/2019. Last fill of levothyroxine 01/2021 and was filled 90 day supply with no refills. [employer] message sent to patient to see if she is continuing to take this medication and if so, if someone else is now managing this for her. Refill denied at this time.

## 2021-07-15 ENCOUNTER — OFFICE VISIT (OUTPATIENT)
Dept: FAMILY MEDICINE | Facility: CLINIC | Age: 41
End: 2021-07-15
Payer: COMMERCIAL

## 2021-07-15 VITALS
HEIGHT: 65 IN | SYSTOLIC BLOOD PRESSURE: 124 MMHG | TEMPERATURE: 97.9 F | DIASTOLIC BLOOD PRESSURE: 78 MMHG | OXYGEN SATURATION: 100 % | WEIGHT: 159 LBS | HEART RATE: 82 BPM | BODY MASS INDEX: 26.49 KG/M2

## 2021-07-15 DIAGNOSIS — K62.89 RECTAL PAIN: Primary | ICD-10-CM

## 2021-07-15 PROCEDURE — 99213 OFFICE O/P EST LOW 20 MIN: CPT | Performed by: FAMILY MEDICINE

## 2021-07-15 ASSESSMENT — MIFFLIN-ST. JEOR: SCORE: 1402.1

## 2021-07-15 NOTE — PROGRESS NOTES
"    Assessment & Plan     Rectal pain: will refer to colorectal for further evaluation and recommendations on symptoms.  - Colorectal Surgery Referral; Future     BMI:   Estimated body mass index is 26.46 kg/m  as calculated from the following:    Height as of this encounter: 1.651 m (5' 5\").    Weight as of this encounter: 72.1 kg (159 lb).       Return in about 1 month (around 8/15/2021) for follow up if symptoms not improving.    Luciano Pedraza DO  [facility] DOUGIE Sullivan is a 40 year old who presents for the following health issues     HPI     Concern - Rectal pain  Onset: ongoing issue since February  Description: rectal pain - no issues with bleeding or constipation  Intensity: moderate  Progression of Symptoms:  Was getting better and now is getting worse again  Accompanying Signs & Symptoms:   Previous history of similar problem:   Precipitating factors:        Worsened by:   Alleviating factors:        Improved by:   Therapies tried and outcome:  went to PT and it improved and now its getting worse again and the PT is no longer helping      She is still having numbness in the backs of both legs -- from rectum to mid posterior thigh. Sometimes fels like she needs to have a bowel movement but doesn't actually have to go. Doesn't take any medication. Urinating fine. No dysuria, hematuria. No change in symptoms with bowel movements.        Review of Systems   Constitutional, HEENT, cardiovascular, pulmonary, gi and gu systems are negative, except as otherwise noted.      Objective    /78 (BP Location: Left arm, Cuff Size: Adult Regular)   Pulse 82   Temp 97.9  F (36.6  C) (Tympanic)   Ht 1.651 m (5' 5\")   Wt 72.1 kg (159 lb)   SpO2 100%   BMI 26.46 kg/m    Body mass index is 26.46 kg/m .  Physical Exam   GENERAL: healthy, alert and no distress  RECTAL (female): normal sphincter tone, no rectal masses          "

## 2021-07-28 ENCOUNTER — TRANSFERRED RECORDS (OUTPATIENT)
Dept: HEALTH INFORMATION MANAGEMENT | Facility: CLINIC | Age: 41
End: 2021-07-28

## 2021-07-28 PROCEDURE — 88112 CYTOPATH CELL ENHANCE TECH: CPT | Mod: TC,ORL | Performed by: COLON & RECTAL SURGERY

## 2021-07-29 ENCOUNTER — OFFICE VISIT (OUTPATIENT)
Dept: OBGYN | Facility: CLINIC | Age: 41
End: 2021-07-29
Payer: COMMERCIAL

## 2021-07-29 VITALS
SYSTOLIC BLOOD PRESSURE: 110 MMHG | WEIGHT: 157.3 LBS | HEIGHT: 65 IN | DIASTOLIC BLOOD PRESSURE: 64 MMHG | BODY MASS INDEX: 26.21 KG/M2

## 2021-07-29 DIAGNOSIS — Z01.419 ENCOUNTER FOR GYNECOLOGICAL EXAMINATION WITHOUT ABNORMAL FINDING: Primary | ICD-10-CM

## 2021-07-29 DIAGNOSIS — N81.11 MIDLINE CYSTOCELE: ICD-10-CM

## 2021-07-29 PROCEDURE — 99395 PREV VISIT EST AGE 18-39: CPT | Performed by: OBSTETRICS & GYNECOLOGY

## 2021-07-29 PROCEDURE — 99214 OFFICE O/P EST MOD 30 MIN: CPT | Mod: 25 | Performed by: OBSTETRICS & GYNECOLOGY

## 2021-07-29 ASSESSMENT — MIFFLIN-ST. JEOR: SCORE: 1394.39

## 2021-07-29 NOTE — NURSING NOTE
"Chief Complaint   Patient presents with     Physical     last pap was 18- NIL with negative HPV.      Pelvic Pain     \"fullness\" ongoing for the past few months, has increased exercising regime and has noticed it more.        Initial /64   Ht 1.651 m (5' 5\")   Wt 71.4 kg (157 lb 4.8 oz)   LMP 2021 (Exact Date)   BMI 26.18 kg/m   Estimated body mass index is 26.18 kg/m  as calculated from the following:    Height as of this encounter: 1.651 m (5' 5\").    Weight as of this encounter: 71.4 kg (157 lb 4.8 oz).  BP completed using cuff size: regular    Questioned patient about current smoking habits.  Pt. has never smoked.          The following HM Due: NONE    Ronit Cummings CMA             "

## 2021-07-29 NOTE — PROGRESS NOTES
"Annual pelvic and breast exam    Ms Laurie Diaz 40 year old presents for her annual breast and pelvic exam.     She also would like to discuss vaginal pressure and prolapse. It usually occurs with exercise and has also noticed that it has made it difficult to insert a tampon. She reports normal bladder and bowel habits. She had a divorce so for the moment she is not sexually active. She reports monthly interval menses without any issues.       Past Medical History:   Diagnosis Date     Anxiety 09/19/2018     Complication of anesthesia      Hypothyroidism      Motion sickness      Paresthesia      PONV (postoperative nausea and vomiting)      Rectal bleeding - resolved, likely secondary to fissure 05/06/2019       Past Surgical History:   Procedure Laterality Date     DAVINCI SALPINGECTOMY Left 11/24/2020    Procedure: Robotic assisted laparoscopic left ovarian cystectomy, bilateral salpingectomy;  Surgeon: Rosalba Pierre MD;  Location: [facility] OR     WRIST SURGERY  08/2015    Wrist surgery for torn ligaments       Current Outpatient Medications   Medication     levothyroxine (SYNTHROID/LEVOTHROID) 50 MCG tablet     ibuprofen (ADVIL/MOTRIN) 200 MG tablet     No current facility-administered medications for this visit.       Not on File    Social History     Tobacco Use     Smoking status: Never Smoker     Smokeless tobacco: Never Used   Substance Use Topics     Alcohol use: No     Alcohol/week: 0.0 standard drinks     Comment: infrequent     Drug use: No       Family History   Problem Relation Age of Onset     Hypothyroidism Mother      Thyroid Disease Mother      Hypertension Father      Heart Disease Father         heart attack     Diabetes Father      No Known Problems Sister      No Known Problems Daughter      No Known Problems Son        ROS: 10 point review of systems negative except for pertinent positives stated in the HPI      Exam:   /64   Ht 1.651 m (5' 5\")   Wt 71.4 kg (157 lb 4.8 oz)  "  LMP 07/12/2021 (Exact Date)   BMI 26.18 kg/m    General Appearance: Well nourished, well developed female, NAD, AOx3  Neurological: Mental Status Normal  HEET: Atraumatic, normocephalic  Breasts: normal without suspicious masses, skin changes or axillary nodes, symmetric fibrous changes in both upper outer quadrants.  Pelvic: Normal external female genitalia.  No external lesions, normal hair distribution, no adenopathy. Speculum exam reveals vaginal epithelium well rugated with no abnormal discharge. Cervix appears smooth, pink, with no visible lesions. On valsalva, there is a midline grade 1 cystocele and rectocele. Bimanual exam reveals normal size uterus, anteverted, non-tender, and mobile. No adnexal masses or tenderness. No cervical motion tenderness.     A/P:   1) Annual breast and pelvic exam  -- normal breast exam  -- not due for Pap smear this visit    2) Midline cystocele, rectocele  -- discussed pathophysiology of these conditions   -- reviewed therapy options spanning pelvic physical therapy, pessary therapy, and surgical interventions such as sacro-colpopexy   -- at this time, patient will proceed with pelvic physical therapy; referral order placed; she will await efficacy of pelvic physical therapy before deciding on whether to proceed with pessary management or not    Total time spent was 30 minutes on the date of the encounter in chart review, patient visit, review of tests, documentation and counseling on the above medical condition, not including time spent on preventative care visit.     Rosalba Pierre MD  [facility]

## 2021-08-03 ENCOUNTER — LAB REQUISITION (OUTPATIENT)
Dept: LAB | Facility: CLINIC | Age: 41
End: 2021-08-03
Payer: COMMERCIAL

## 2021-08-04 LAB
PATH REPORT.COMMENTS IMP SPEC: NORMAL
PATH REPORT.FINAL DX SPEC: NORMAL
PATH REPORT.GROSS SPEC: NORMAL
PATH REPORT.RELEVANT HX SPEC: NORMAL

## 2021-08-04 PROCEDURE — 88112 CYTOPATH CELL ENHANCE TECH: CPT | Mod: 26

## 2021-08-12 ENCOUNTER — TRANSFERRED RECORDS (OUTPATIENT)
Dept: HEALTH INFORMATION MANAGEMENT | Facility: CLINIC | Age: 41
End: 2021-08-12

## 2021-08-20 ENCOUNTER — LAB REQUISITION (OUTPATIENT)
Dept: LAB | Facility: CLINIC | Age: 41
End: 2021-08-20
Payer: COMMERCIAL

## 2021-08-20 PROCEDURE — 88305 TISSUE EXAM BY PATHOLOGIST: CPT | Performed by: PATHOLOGY

## 2021-08-20 PROCEDURE — 88305 TISSUE EXAM BY PATHOLOGIST: CPT | Mod: TC,ORL | Performed by: COLON & RECTAL SURGERY

## 2021-08-20 PROCEDURE — 88305 TISSUE EXAM BY PATHOLOGIST: CPT | Mod: 26 | Performed by: PATHOLOGY

## 2021-08-23 LAB
PATH REPORT.COMMENTS IMP SPEC: NORMAL
PATH REPORT.COMMENTS IMP SPEC: NORMAL
PATH REPORT.FINAL DX SPEC: NORMAL
PATH REPORT.GROSS SPEC: NORMAL
PATH REPORT.RELEVANT HX SPEC: NORMAL
PHOTO IMAGE: NORMAL

## 2021-08-30 DIAGNOSIS — E03.9 ACQUIRED HYPOTHYROIDISM: ICD-10-CM

## 2021-09-01 ENCOUNTER — THERAPY VISIT (OUTPATIENT)
Dept: PHYSICAL THERAPY | Facility: CLINIC | Age: 41
End: 2021-09-01
Payer: COMMERCIAL

## 2021-09-01 DIAGNOSIS — M62.89 PELVIC FLOOR DYSFUNCTION: ICD-10-CM

## 2021-09-01 DIAGNOSIS — M62.81 MUSCLE WEAKNESS (GENERALIZED): Primary | ICD-10-CM

## 2021-09-01 PROCEDURE — 97110 THERAPEUTIC EXERCISES: CPT | Mod: GP | Performed by: PHYSICAL THERAPIST

## 2021-09-01 PROCEDURE — 97112 NEUROMUSCULAR REEDUCATION: CPT | Mod: GP | Performed by: PHYSICAL THERAPIST

## 2021-09-01 RX ORDER — LEVOTHYROXINE SODIUM 50 UG/1
50 TABLET ORAL DAILY
Qty: 90 TABLET | Refills: 0 | Status: SHIPPED | OUTPATIENT
Start: 2021-09-01 | End: 2021-12-06

## 2021-09-01 NOTE — TELEPHONE ENCOUNTER
Prescription approved per [facility] Refill Protocol.    Lucero Apple RN  [facility]

## 2021-09-25 ENCOUNTER — APPOINTMENT (OUTPATIENT)
Dept: ULTRASOUND IMAGING | Facility: CLINIC | Age: 41
End: 2021-09-25
Attending: EMERGENCY MEDICINE
Payer: COMMERCIAL

## 2021-09-25 ENCOUNTER — HOSPITAL ENCOUNTER (EMERGENCY)
Facility: CLINIC | Age: 41
Discharge: HOME | End: 2021-09-25
Attending: EMERGENCY MEDICINE | Admitting: EMERGENCY MEDICINE
Payer: COMMERCIAL

## 2021-09-25 ENCOUNTER — NURSE TRIAGE (OUTPATIENT)
Dept: NURSING | Facility: CLINIC | Age: 41
End: 2021-09-25

## 2021-09-25 VITALS
HEIGHT: 65 IN | WEIGHT: 160 LBS | OXYGEN SATURATION: 100 % | TEMPERATURE: 98.7 F | RESPIRATION RATE: 16 BRPM | DIASTOLIC BLOOD PRESSURE: 85 MMHG | SYSTOLIC BLOOD PRESSURE: 135 MMHG | HEART RATE: 83 BPM | BODY MASS INDEX: 26.66 KG/M2

## 2021-09-25 DIAGNOSIS — R93.89 ABNORMAL PELVIC ULTRASOUND: ICD-10-CM

## 2021-09-25 DIAGNOSIS — R10.31 RIGHT LOWER QUADRANT PAIN: ICD-10-CM

## 2021-09-25 LAB
ALBUMIN SERPL-MCNC: 3.9 G/DL (ref 3.4–5)
ALBUMIN UR-MCNC: NEGATIVE MG/DL
ALP SERPL-CCNC: 68 U/L (ref 40–150)
ALT SERPL W P-5'-P-CCNC: 21 U/L (ref 0–50)
ANION GAP SERPL CALCULATED.3IONS-SCNC: 7 MMOL/L (ref 3–14)
APPEARANCE UR: CLEAR
AST SERPL W P-5'-P-CCNC: 12 U/L (ref 0–45)
B-HCG FREE SERPL-ACNC: <5 IU/L (ref 0–5)
BASOPHILS # BLD AUTO: 0.1 10E3/UL (ref 0–0.2)
BASOPHILS NFR BLD AUTO: 1 %
BILIRUB SERPL-MCNC: 1 MG/DL (ref 0.2–1.3)
BILIRUB UR QL STRIP: NEGATIVE
BUN SERPL-MCNC: 12 MG/DL (ref 7–30)
CALCIUM SERPL-MCNC: 8.8 MG/DL (ref 8.5–10.1)
CHLORIDE BLD-SCNC: 109 MMOL/L (ref 94–109)
CO2 SERPL-SCNC: 25 MMOL/L (ref 20–32)
COLOR UR AUTO: NORMAL
CREAT SERPL-MCNC: 0.56 MG/DL (ref 0.52–1.04)
EOSINOPHIL # BLD AUTO: 0.5 10E3/UL (ref 0–0.7)
EOSINOPHIL NFR BLD AUTO: 5 %
ERYTHROCYTE [DISTWIDTH] IN BLOOD BY AUTOMATED COUNT: 12.5 % (ref 10–15)
GFR SERPL CREATININE-BSD FRML MDRD: >90 ML/MIN/1.73M2
GLUCOSE BLD-MCNC: 97 MG/DL (ref 70–99)
GLUCOSE UR STRIP-MCNC: NEGATIVE MG/DL
HCT VFR BLD AUTO: 41.1 % (ref 35–47)
HGB BLD-MCNC: 14.4 G/DL (ref 11.7–15.7)
HGB UR QL STRIP: NEGATIVE
IMM GRANULOCYTES # BLD: 0 10E3/UL
IMM GRANULOCYTES NFR BLD: 0 %
KETONES UR STRIP-MCNC: NEGATIVE MG/DL
LEUKOCYTE ESTERASE UR QL STRIP: NEGATIVE
LIPASE SERPL-CCNC: 140 U/L (ref 73–393)
LYMPHOCYTES # BLD AUTO: 3.2 10E3/UL (ref 0.8–5.3)
LYMPHOCYTES NFR BLD AUTO: 31 %
MCH RBC QN AUTO: 33.3 PG (ref 26.5–33)
MCHC RBC AUTO-ENTMCNC: 35 G/DL (ref 31.5–36.5)
MCV RBC AUTO: 95 FL (ref 78–100)
MONOCYTES # BLD AUTO: 0.7 10E3/UL (ref 0–1.3)
MONOCYTES NFR BLD AUTO: 7 %
NEUTROPHILS # BLD AUTO: 5.9 10E3/UL (ref 1.6–8.3)
NEUTROPHILS NFR BLD AUTO: 56 %
NITRATE UR QL: NEGATIVE
NRBC # BLD AUTO: 0 10E3/UL
NRBC BLD AUTO-RTO: 0 /100
PH UR STRIP: 7 [PH] (ref 5–7)
PLATELET # BLD AUTO: 287 10E3/UL (ref 150–450)
POTASSIUM BLD-SCNC: 3.6 MMOL/L (ref 3.4–5.3)
PROT SERPL-MCNC: 7.2 G/DL (ref 6.8–8.8)
RBC # BLD AUTO: 4.32 10E6/UL (ref 3.8–5.2)
RBC URINE: <1 /HPF
SODIUM SERPL-SCNC: 141 MMOL/L (ref 133–144)
SP GR UR STRIP: 1.01 (ref 1–1.03)
UROBILINOGEN UR STRIP-MCNC: NORMAL MG/DL
WBC # BLD AUTO: 10.5 10E3/UL (ref 4–11)
WBC URINE: <1 /HPF

## 2021-09-25 PROCEDURE — 84702 CHORIONIC GONADOTROPIN TEST: CPT

## 2021-09-25 PROCEDURE — 76830 TRANSVAGINAL US NON-OB: CPT

## 2021-09-25 PROCEDURE — 99285 EMERGENCY DEPT VISIT HI MDM: CPT | Mod: 25

## 2021-09-25 PROCEDURE — 85025 COMPLETE CBC W/AUTO DIFF WBC: CPT | Performed by: EMERGENCY MEDICINE

## 2021-09-25 PROCEDURE — 81001 URINALYSIS AUTO W/SCOPE: CPT | Performed by: EMERGENCY MEDICINE

## 2021-09-25 PROCEDURE — 36415 COLL VENOUS BLD VENIPUNCTURE: CPT | Performed by: EMERGENCY MEDICINE

## 2021-09-25 PROCEDURE — 80053 COMPREHEN METABOLIC PANEL: CPT | Performed by: EMERGENCY MEDICINE

## 2021-09-25 PROCEDURE — 83690 ASSAY OF LIPASE: CPT | Performed by: EMERGENCY MEDICINE

## 2021-09-25 PROCEDURE — 76770 US EXAM ABDO BACK WALL COMP: CPT

## 2021-09-25 RX ORDER — ACETAMINOPHEN 325 MG/1
650 TABLET ORAL ONCE
Status: DISCONTINUED | OUTPATIENT
Start: 2021-09-25 | End: 2021-09-25 | Stop reason: HOSPADM

## 2021-09-25 RX ORDER — KETOROLAC TROMETHAMINE 15 MG/ML
15 INJECTION, SOLUTION INTRAMUSCULAR; INTRAVENOUS ONCE
Status: DISCONTINUED | OUTPATIENT
Start: 2021-09-25 | End: 2021-09-25 | Stop reason: HOSPADM

## 2021-09-25 ASSESSMENT — ENCOUNTER SYMPTOMS
DIZZINESS: 1
DYSURIA: 0
FEVER: 0
VOMITING: 0
NAUSEA: 0
DIARRHEA: 0
ABDOMINAL PAIN: 1

## 2021-09-25 ASSESSMENT — MIFFLIN-ST. JEOR: SCORE: 1406.64

## 2021-09-25 NOTE — TELEPHONE ENCOUNTER
"Right abdominal pain for last week.  Constant and 6/10.  Had some blood in toilet bowel while having stool Thursday and Friday, but not today.  She had spoken with colorectal clinic who said it was probably hemorrhoids, but triage protocol for abdominal pain and bleeding recommends ER.  Patient agrees with plan.    Erin Basurto RN  [facility]        Reason for Disposition    Blood in bowel movements   (Exception: blood on surface of BM with constipation)    Additional Information    Negative: Shock suspected (e.g., cold/pale/clammy skin, too weak to stand, low BP, rapid pulse)    Negative: Difficult to awaken or acting confused (e.g., disoriented, slurred speech)    Negative: Passed out (i.e., lost consciousness, collapsed and was not responding)    Negative: Sounds like a life-threatening emergency to the triager    Negative: [1] SEVERE pain (e.g., excruciating) AND [2] present > 1 hour    Negative: [1] SEVERE pain AND [2] age > 60    Negative: [1] Vomiting AND [2] contains red blood or black (\"coffee ground\") material  (Exception: few red streaks in vomit that only happened once)    Protocols used: ABDOMINAL PAIN - FEMALE-A-AH      "

## 2021-09-25 NOTE — ED TRIAGE NOTES
Patient comes in for evaluation of pain in lower right side for about a week, a few days later started noticing some blood in stools, thinks she may have had some vaginal bleeding today. Dizzy with position changes. Denies nausea. ABCs intact.

## 2021-09-25 NOTE — ED PROVIDER NOTES
"  History   Chief Complaint:  Abdominal Pain       The history is provided by the patient.      Laurie Diaz is a 40 year old female with history of hypothyroiism who presents with abdominal pain. Laurie has had dull, 5-6/10 right lower quadrant abdominal pain for about a week. Occasionally this radiates to her right lower back. She had 2 days of bright red blood with wiping and in the toilet water. She had a colonoscopy about a month ago so she contacted GI who felt it was likely hemorrhoids. She has since had a bowel movement without hematochezia. Today she noticed a small amount of blood in her underwear and is unsure if it was from her vagina or possibly hematuria. She denies dysuria or vaginal discharge. She denies anorexia, diarrhea, nausea, vomiting, or fever. She called her primary care to schedule an appointment and was directed to the ER. She has tried no analgesics for her pain as she is \"not really a medication person\".    She mentions she has had 1 month of feeling briefly dizzy when changing positions from stting to standing.    Review of Systems   Constitutional: Negative for appetite change and fever.   Gastrointestinal: Positive for abdominal pain and blood in stool (resolved). Negative for diarrhea, nausea and vomiting.   Genitourinary: Positive for vaginal bleeding (unclear). Negative for dysuria and vaginal discharge.   Neurological: Positive for dizziness.   All other systems reviewed and are negative.    Allergies:  Tetracyclines & Related    Medications:  Levothyroxine    Past Medical History:    Anxiety   Complication of anesthesia   Hypothyroidism    Paresthesia   Rectal bleeding  Denies history of kidney stones     Past Surgical History:    Bilateral Salpingectomy  Left ovarian cystectomy  Left wrist arthroplasty    Family History:    Hypothyroidism: Mother  Hypertension: Father  MI: Father  Diabetes: Father    Social History:  PCP: [facility]  Presents to the ED with alone. " "    Physical Exam     Patient Vitals for the past 24 hrs:   BP Temp Temp src Pulse Resp SpO2 Height Weight   09/25/21 1700 135/85 -- -- 83 -- 100 % -- --   09/25/21 1634 (!) 140/96 -- -- -- -- -- -- --   09/25/21 1630 132/85 -- -- 78 -- 100 % -- --   09/25/21 1606 -- -- -- -- -- -- 1.651 m (5' 5\") 72.6 kg (160 lb)   09/25/21 1535 (!) 142/86 98.7  F (37.1  C) Temporal 75 16 100 % -- --     Lying BP: 132/85 HR: 78 bpm  Sitting BP: 130/96 HR: 73 bpm  Standing BP: 138/94 HR: 82 bpm    Physical Exam  General: Well-developed and well-nourished. Well appearing young  woman. Cooperative.  Head:  Atraumatic.  Eyes:  Conjunctivae, lids, and sclerae are normal.  Neck:  Supple. Normal range of motion.  CV:  Regular rate and rhythm. Normal heart sounds with no murmurs, rubs, or gallops detected.  Resp:  No respiratory distress. Clear to auscultation bilaterally without decreased breath sounds, wheezing, rales, or rhonchi.  GI:  Soft. Non-distended. Mild tenderness to palpation in the right lower quadrant and suprapubis.   MS:  Normal ROM.   Skin:  Warm. Non-diaphoretic. No pallor.  Neuro:  Awake. A&Ox3. Normal strength.  Psych: Normal mood and affect. Normal speech.  Vitals reviewed.    Emergency Department Course   Imaging:  US Pelvic Complete with Transvaginal  1.  Question collapsing cyst bilateral ovaries. These are somewhat irregular and are indeterminate. Follow-up pelvic ultrasound in 6-10 weeks, at a different stage of the patient's cycle, is recommended to ensure resolution of these findings.  2.  Small amount of complex fluid is seen in the endometrial canal of the uterine fundus. Endometrium is otherwise unremarkable.  3.  No other abnormalities are seen. Appendicitis is not excluded on the basis of the study.    Reading as per radiology.     US Renal Complete  1.  Normal kidney ultrasound.    Reading as per radiology.     Laboratory:  CBC: WBC 10.5, HGB 14.4,    CMP: Glucose 97, o/w WNL (Creatinine: " 0.56)    UA: o/w Negative  Lipase: 140  ISTAT HCG quantitative pregnancy POCT: <5.0    Emergency Department Course:  Reviewed:  I reviewed the patient's nursing notes, vitals, past medical records, and Care Everywhere.     Assessments:  1612 I performed an assessment and examination of the patient as noted above.   1658 I rechecked and updated the patient on findings and treatment plan. She wants to go forward with ultrasound because she is not comfortable with CT.   1903 I rechecked the patient and discussed radiology findings. The patient continues to decline CT.     Consults:  1900 I spoke with Dr. Lyons, radiology, regarding the patient's history and findings.     Interventions:  Patient declined Tylenol (659 mg PO)  Patient declined Toradol (15 mg, IV)    Disposition:  The patient was discharged home.     Impression & Plan   Medical Decision Making:  Laurie is a 40 year old woman who has had about 1 week of right lower quadrant pain. She did have some bright red blood with her bowel movements for a couple of days but actually talked with GI (as she has had a recent colonoscopy) who remarked it was likely due to hemorrhoids. She had a bowel movement since without bright red blood per rectum. She also noticed a small amount of blood in her underwear today and she is uncertain if this was vaginal or possibly hematuria. She denies any other acute symptoms but has had about a month of positional dizziness. Her exam is remarkable for mild right lower quadrant and suprapubic tenderness. There is no evidence of hematuria or UTI. There is no pancreatitis, hepatitis, biliary obstruction, kidney injury, electrolyte derangements, anemia, or leukocytosis. My differential includes, but is not limited to, appendicitis, ovarian pathology, or ureterolithiasis. I recommended CT scan to evaluate for all of these amongst other pathologies. Laurie is very nervous about the radiation and agrees instead to pelvic ultrasound and renal  ultrasound. Renal ultrasound was unremarkable and this makes ureterolithiasis even more unlikely. Pelvic ultrasound reveals a collapsing cyst bilaterally which are somewhat irregular but indeterminant. Radiology recommends follow-up ultrasound in 6 to 12 weeks at a different stage of cycle. There is also small amount of complex fluid in the endometrial canal. I spoke with the radiologist who does not feel either of these is the source for her pain and feel they will likely both resolve with time on repeat ultrasound. Laurie and I had a long discussion that at this point the source for her abdominal pain is unclear. I have not definitively ruled out appendicitis although my suspicion for this is low as her tenderness is quite mild and she has had symptoms for 1 week. I offered to complete CT scan to look for this or other causes of her pain but she declines and would prefer to monitor her pain at home and return if it is worsening. I think this is a reasonable course of action in order to minimize radiation. I have recommended she use Tylenol or ibuprofen at home but she does not like medications and actually declined Tylenol and Toradol here. I encouraged her to follow-up with her primary care provider regarding her pain and either her PCP or her gynecologist can help arrange her outpatient repeat pelvic ultrasound. All of her questions were answered and she verbalized understanding. Amenable to discharge.    Of note, we did complete orthostatics given her positional dizziness and these were unremarkable. This is of uncertain etiology but felt to be benign and unrelated to her abdominal pain.    Covid-19  Laurie Diaz was evaluated during a global COVID-19 pandemic, which necessitated consideration that the patient might be at risk for infection with the SARS-CoV-2 virus that causes COVID-19.   Applicable protocols for evaluation were followed during the patient's care.   COVID-19 was considered as part of the  patient's evaluation.     Diagnosis:    ICD-10-CM    1. Right lower quadrant pain  R10.31    2. Abnormal pelvic ultrasound  R93.89     Collapsing cysts bilaterally.       Scribe Disclosure:  I, Billy Guillermo, am serving as a scribe at 4:12 PM on 9/25/2021 to document services personally performed by Amanda Garner MD based on my observations and the provider's statements to me.       Amanda Garner MD  09/26/21 1300

## 2021-09-26 ASSESSMENT — ENCOUNTER SYMPTOMS
BLOOD IN STOOL: 1
APPETITE CHANGE: 0

## 2021-09-26 NOTE — DISCHARGE INSTRUCTIONS
You will need a repeat ultrasound of your pelvis in 6 to 10 weeks at a different stage in your cycle to look at the cysts again. Your regular doctor or gynecologist can help arrange this.  The findings on your ultrasound and unlikely to be the cause of your pain.  We have not ruled out appendicitis so if you are having worsening pain or new concerns you need to return for CT scan.  I would recommend you try Tylenol or ibuprofen for your pain and follow closely with your primary care provider.

## 2021-09-27 DIAGNOSIS — Z87.42 HX OF OVARIAN CYST: Primary | ICD-10-CM

## 2021-10-04 ENCOUNTER — HEALTH MAINTENANCE LETTER (OUTPATIENT)
Age: 41
End: 2021-10-04

## 2021-11-08 ENCOUNTER — HOSPITAL ENCOUNTER (OUTPATIENT)
Dept: ULTRASOUND IMAGING | Facility: CLINIC | Age: 41
Discharge: HOME | End: 2021-11-08
Attending: OBSTETRICS & GYNECOLOGY | Admitting: OBSTETRICS & GYNECOLOGY
Payer: COMMERCIAL

## 2021-11-08 DIAGNOSIS — Z87.42 HX OF OVARIAN CYST: ICD-10-CM

## 2021-11-08 PROCEDURE — 76830 TRANSVAGINAL US NON-OB: CPT

## 2021-11-10 DIAGNOSIS — B37.31 YEAST INFECTION OF THE VAGINA: Primary | ICD-10-CM

## 2021-11-10 RX ORDER — FLUCONAZOLE 150 MG/1
150 TABLET ORAL DAILY
Qty: 3 TABLET | Refills: 0 | Status: SHIPPED | OUTPATIENT
Start: 2021-11-10 | End: 2021-11-13

## 2021-12-02 ENCOUNTER — TRANSFERRED RECORDS (OUTPATIENT)
Dept: HEALTH INFORMATION MANAGEMENT | Facility: CLINIC | Age: 41
End: 2021-12-02
Payer: COMMERCIAL

## 2021-12-24 ENCOUNTER — OFFICE VISIT (OUTPATIENT)
Dept: OBGYN | Facility: CLINIC | Age: 41
End: 2021-12-24
Payer: COMMERCIAL

## 2021-12-24 VITALS
HEIGHT: 65 IN | DIASTOLIC BLOOD PRESSURE: 70 MMHG | SYSTOLIC BLOOD PRESSURE: 118 MMHG | WEIGHT: 143 LBS | BODY MASS INDEX: 23.82 KG/M2

## 2021-12-24 DIAGNOSIS — N81.11 MIDLINE CYSTOCELE: ICD-10-CM

## 2021-12-24 DIAGNOSIS — Z46.89 ENCOUNTER FOR FITTING AND ADJUSTMENT OF PESSARY: Primary | ICD-10-CM

## 2021-12-24 PROCEDURE — 99213 OFFICE O/P EST LOW 20 MIN: CPT | Mod: 25 | Performed by: OBSTETRICS & GYNECOLOGY

## 2021-12-24 PROCEDURE — A4562 PESSARY, NON RUBBER,ANY TYPE: HCPCS | Performed by: OBSTETRICS & GYNECOLOGY

## 2021-12-24 PROCEDURE — 57160 INSERT PESSARY/OTHER DEVICE: CPT | Performed by: OBSTETRICS & GYNECOLOGY

## 2021-12-24 RX ORDER — OXYQUINOLINE/BORIC ACID 0.025 %
1 JELLY WITH APPLICATOR (GRAM) VAGINAL DAILY PRN
Qty: 113.4 G | Refills: 0 | Status: SHIPPED | OUTPATIENT
Start: 2021-12-24 | End: 2021-12-28

## 2021-12-24 ASSESSMENT — MIFFLIN-ST. JEOR: SCORE: 1324.52

## 2021-12-24 NOTE — NURSING NOTE
"Chief Complaint   Patient presents with     Pessary Check/Fit/Insert       Initial /70 (BP Location: Left arm, Patient Position: Chair, Cuff Size: Adult Regular)   Ht 1.651 m (5' 5\")   Wt 64.9 kg (143 lb)   LMP 2021 (Approximate)   Breastfeeding No   BMI 23.80 kg/m   Estimated body mass index is 23.8 kg/m  as calculated from the following:    Height as of this encounter: 1.651 m (5' 5\").    Weight as of this encounter: 64.9 kg (143 lb).  BP completed using cuff size: regular    Questioned patient about current smoking habits.  Pt. has never smoked.          The following HM Due: NONE  Bertha Burks CMA    "

## 2021-12-25 NOTE — PROGRESS NOTES
"Pessary fitting    Ms. Laurie Diaz 41 year old returns for pessary fitting visit.   She was seen by me on 7/29/21 for an annual pelvic and breast exam in which she expressed complaints about vaginal pressure and prolapse. Her evaluation led to the diagnosis of a grade 1 cystocele and rectocele. She was counseled at that time about her options and elected to proceed with pelvic physical therapy.   Today, she reports that pelvic physical therapy was not helpful in addressing her issues. She reports that the prolapse sensation is more pronounced when she is exercising such as running. She also complains mild discomfort with intercourse to which she is attributing to the vaginal prolapse. Due to this, she requests that she address her concerns with a pessary. She also will contemplate surgical treatment if she feels the pessary is not addressing her concerns adequately.     /70 (BP Location: Left arm, Patient Position: Chair, Cuff Size: Adult Regular)   Ht 1.651 m (5' 5\")   Wt 64.9 kg (143 lb)   LMP 12/13/2021 (Approximate)   Breastfeeding No   BMI 23.80 kg/m      General Appearance: NAD  Pelvic:Normal external female genitalia.  No external lesions, normal hair distribution, no adenopathy. Speculum exam reveals vaginal epithelium well rugated with no abnormal discharge. Cervix appears smooth, pink, with no visible lesions. Bimanual exam deferred.     Procedure: Pessary fitting    The patient was placed in dorsal lithotomy position with her feet placed on stirrups. A sample test kit size 2 ring pessary was fited into the vagina in the routine fashion. She was asked to valsalva and the pessary was noted to remain in the vagina in both the supine and upright position. The test kit pessary was removed and the new pessary of the same size inserted in the usual fashion. Patient tolerated the procedure well.     A/P: 1) Midline cystocele, rectocele 2) Pessary fitting  -- provided instruction to patient on " removal and reinsertion of pessary for independent maintenance at home  -- briefly reviewed again surgical intervention given her intent on pursuit of this if the pessary is not successful with her condition; informed patient that I would refer her to see Dr. Jacqueline Hargrove is she chose to request a referral   -- recommended that she return in 6 months to a year for surveillance of vaginal epithelium to ensure no erosions or lesions from pessary use    Total time spent was 20 minutes on the date of the encounter in chart review, patient visit, review of tests, documentation and counseling on the above medical conditions, excluding the time spent on the procedure.     Rosalba Pierre MD  [facility]

## 2021-12-28 ENCOUNTER — VIRTUAL VISIT (OUTPATIENT)
Dept: FAMILY MEDICINE | Facility: CLINIC | Age: 41
End: 2021-12-28
Payer: COMMERCIAL

## 2021-12-28 DIAGNOSIS — Z53.9 ERRONEOUS ENCOUNTER--DISREGARD: Primary | ICD-10-CM

## 2021-12-28 RX ORDER — OXYQUINOLINE SULFATE AND SODIUM LAURYL SULFATE .25; .1 MG/G; MG/G
JELLY VAGINAL
COMMUNITY
Start: 2021-12-24 | End: 2022-01-10

## 2021-12-28 NOTE — PROGRESS NOTES
"Laurie is a 41 year old who is being evaluated via a billable video visit.      How would you like to obtain your AVS? MyChart  If the video visit is dropped, the invitation should be resent by: Text to cell phone: [phone]  Will anyone else be joining your video visit? No  {If patient encounters technical issues they should call [phone] :[id number]}    Video Start Time: {video visit start/end time for provider to select:513119}    {PROVIDER CHARTING PREFERENCE:087218}    Subjective   Laurie is a 41 year old who presents for the following health issues  accompanied by her self.    History of Present Illness       She eats 2-3 servings of fruits and vegetables daily.She consumes 0 sweetened beverage(s) daily.She exercises with enough effort to increase her heart rate 20 to 29 minutes per day.  She exercises with enough effort to increase her heart rate 4 days per week.   She is taking medications regularly.       Concern - Abdominal Right Side pain  Onset: Ongoing x 3 months, was seen at [facility] ER 09/25/21  Description: dull   Intensity: mild, 2/10  Progression of Symptoms:  same and constant  Accompanying Signs & Symptoms: none  Previous history of similar problem: none  Precipitating factors:        Worsened by: none  Alleviating factors:        Improved by: none  Therapies tried and outcome: Heating pad    Not nausea, no vomiting, normal BM's.    Review of Systems   {ROS COMP (Optional):329074}      Objective           Vitals:  No vitals were obtained today due to virtual visit.    Physical Exam   {video visit exam brief selected:556388::\"GENERAL: Healthy, alert and no distress\",\"EYES: Eyes grossly normal to inspection.  No discharge or erythema, or obvious scleral/conjunctival abnormalities.\",\"RESP: No audible wheeze, cough, or visible cyanosis.  No visible retractions or increased work of breathing.  \",\"SKIN: Visible skin clear. No significant rash, abnormal pigmentation or lesions.\",\"NEURO: Cranial " "nerves grossly intact.  Mentation and speech appropriate for age.\",\"PSYCH: Mentation appears normal, affect normal/bright, judgement and insight intact, normal speech and appearance well-groomed.\"}    {Diagnostic Test Results (Optional):010790}    {AMBULATORY ATTESTATION (Optional):443496}        Video-Visit Details    Type of service:  Video Visit    Video End Time:{video visit start/end time for provider to select:845784}    Originating Location (pt. Location): {video visit patient location:358619::\"Home\"}    Distant Location (provider location):  [facility]     Platform used for Video Visit: {Virtual Visit Platforms:013914::\"[employer]\"}  "

## 2021-12-30 ENCOUNTER — TRANSFERRED RECORDS (OUTPATIENT)
Dept: HEALTH INFORMATION MANAGEMENT | Facility: CLINIC | Age: 41
End: 2021-12-30
Payer: COMMERCIAL

## 2022-01-06 ENCOUNTER — VIRTUAL VISIT (OUTPATIENT)
Dept: FAMILY MEDICINE | Facility: CLINIC | Age: 42
End: 2022-01-06
Payer: COMMERCIAL

## 2022-01-06 DIAGNOSIS — R10.9 RIGHT SIDED ABDOMINAL PAIN: Primary | ICD-10-CM

## 2022-01-06 PROCEDURE — 99213 OFFICE O/P EST LOW 20 MIN: CPT | Mod: GT | Performed by: FAMILY MEDICINE

## 2022-01-06 NOTE — PROGRESS NOTES
Laurie is a 41 year old who is being evaluated via a billable video visit.      How would you like to obtain your AVS? MyChart  If the video visit is dropped, the invitation should be resent by: Text to cell phone: [phone]  Will anyone else be joining your video visit? No    Video Start Time: 8:22 AM    Assessment & Plan     Right sided abdominal pain: unclear etiology. She is not experiencing any red flag type symptoms like unexplained weight loss, night sweats, lower extremity swelling. She actually points to her RUQ where pain is located and so I wonder if she may have gallstones/sludge that is the cause. We did discuss CT scan and radiation risks but I think we can gain the same info from an ultrasound so we will start with that.   - US Abdomen Limited; Future        No follow-ups on file.    Luciano Pedraza, [facility] [place]    Subjective   Laurie is a 41 year old who presents for the following health issues     HPI     ED/UC Followup:    Facility:  [facility]   Date of visit: 9/25/21  Reason for visit: Right lower right quadrant   Current Status: Pretty consistent, not sure what it could be related to.      She was seen in the ED on 9/25/2021 for 1 week history of right lower quadrant abdominal pain.  She had been seen by GI and had a recent colonoscopy which showed hemorrhoids.  Exam was positive for mild right lower quadrant and suprapubic tenderness.  UA was negative for infection.  Her blood work was unremarkable; No leukocytosis or elevated lipase.  A renal ultrasound was completed and was normal.  A pelvic ultrasound was also completed and showed the following:    IMPRESSION:  1.  Question collapsing cyst bilateral ovaries. These are somewhat irregular and are indeterminate. Follow-up pelvic ultrasound in 6-10 weeks, at a different stage of the patient's cycle, is recommended to ensure resolution of these findings.  2.  Small amount of complex fluid is seen in the  endometrial canal of the uterine fundus. Endometrium is otherwise unremarkable.  3.  No other abnormalities are seen. Appendicitis is not excluded on the basis of the study    A follow up pelvic ultrasound was then completed on 11/8/2021 which showed:    IMPRESSION:  1.  Trace pelvic free fluid likely physiologic. Otherwise normal  pelvic ultrasound.        Still having on goinig pain on the right side. Just a dull pain. Sometimes gets worse but can't really attribute it to anything. Sometimes constipated and taxed metamucil. Doesn't notice any change with bowel movements. No real change in pain with eating but did notice pain all night after eating pizza. She denies any night sweats, unexplained weight loss, lower extremity edema.           Review of Systems   Constitutional, HEENT, cardiovascular, pulmonary, gi and gu systems are negative, except as otherwise noted.      Objective           Vitals:  No vitals were obtained today due to virtual visit.    Physical Exam   GENERAL: Healthy, alert and no distress  EYES: Eyes grossly normal to inspection.  No discharge or erythema, or obvious scleral/conjunctival abnormalities.  RESP: No audible wheeze, cough, or visible cyanosis.  No visible retractions or increased work of breathing.    SKIN: Visible skin clear. No significant rash, abnormal pigmentation or lesions.  NEURO: Cranial nerves grossly intact.  Mentation and speech appropriate for age.  PSYCH: Mentation appears normal, affect normal/bright, judgement and insight intact, normal speech and appearance well-groomed.          Video-Visit Details    Type of service:  Video Visit    Video End Time:8:32 AM    Originating Location (pt. Location): Home    Distant Location (provider location):  [facility]     Platform used for Video Visit: [employer]

## 2022-01-08 ENCOUNTER — MYC MEDICAL ADVICE (OUTPATIENT)
Dept: OBGYN | Facility: CLINIC | Age: 42
End: 2022-01-08
Payer: COMMERCIAL

## 2022-01-08 DIAGNOSIS — Z46.89 ENCOUNTER FOR FITTING AND ADJUSTMENT OF PESSARY: Primary | ICD-10-CM

## 2022-01-11 RX ORDER — OXYQUINOLINE SULFATE AND SODIUM LAURYL SULFATE .25; .1 MG/G; MG/G
1 JELLY VAGINAL DAILY PRN
Qty: 113.4 G | Refills: 1 | Status: SHIPPED | OUTPATIENT
Start: 2022-01-11 | End: 2023-07-17

## 2022-01-13 ENCOUNTER — HOSPITAL ENCOUNTER (OUTPATIENT)
Dept: ULTRASOUND IMAGING | Facility: CLINIC | Age: 42
Discharge: HOME | End: 2022-01-13
Attending: FAMILY MEDICINE | Admitting: FAMILY MEDICINE
Payer: COMMERCIAL

## 2022-01-13 ENCOUNTER — LAB (OUTPATIENT)
Dept: LAB | Facility: CLINIC | Age: 42
End: 2022-01-13
Payer: COMMERCIAL

## 2022-01-13 DIAGNOSIS — E03.9 ACQUIRED HYPOTHYROIDISM: ICD-10-CM

## 2022-01-13 DIAGNOSIS — R10.9 RIGHT SIDED ABDOMINAL PAIN: ICD-10-CM

## 2022-01-13 PROCEDURE — 76705 ECHO EXAM OF ABDOMEN: CPT

## 2022-01-13 PROCEDURE — 36415 COLL VENOUS BLD VENIPUNCTURE: CPT

## 2022-01-13 PROCEDURE — 84443 ASSAY THYROID STIM HORMONE: CPT

## 2022-01-14 LAB — TSH SERPL DL<=0.005 MIU/L-ACNC: 1.76 MU/L (ref 0.4–4)

## 2022-01-20 ENCOUNTER — TRANSFERRED RECORDS (OUTPATIENT)
Dept: HEALTH INFORMATION MANAGEMENT | Facility: CLINIC | Age: 42
End: 2022-01-20
Payer: COMMERCIAL

## 2022-01-20 ENCOUNTER — MYC MEDICAL ADVICE (OUTPATIENT)
Dept: FAMILY MEDICINE | Facility: CLINIC | Age: 42
End: 2022-01-20
Payer: COMMERCIAL

## 2022-01-25 ENCOUNTER — HOSPITAL ENCOUNTER (OUTPATIENT)
Dept: ULTRASOUND IMAGING | Facility: CLINIC | Age: 42
Discharge: HOME | End: 2022-01-25
Attending: OTOLARYNGOLOGY | Admitting: OTOLARYNGOLOGY
Payer: COMMERCIAL

## 2022-01-25 DIAGNOSIS — R07.0 THROAT DISCOMFORT: ICD-10-CM

## 2022-01-25 PROCEDURE — 76536 US EXAM OF HEAD AND NECK: CPT

## 2022-02-07 ENCOUNTER — TRANSFERRED RECORDS (OUTPATIENT)
Dept: HEALTH INFORMATION MANAGEMENT | Facility: CLINIC | Age: 42
End: 2022-02-07
Payer: COMMERCIAL

## 2022-02-21 ENCOUNTER — HOSPITAL ENCOUNTER (OUTPATIENT)
Dept: GENERAL RADIOLOGY | Facility: CLINIC | Age: 42
Discharge: HOME | End: 2022-02-21
Admitting: INTERNAL MEDICINE
Payer: COMMERCIAL

## 2022-02-21 DIAGNOSIS — R09.A2 GLOBUS SENSATION: ICD-10-CM

## 2022-02-21 PROCEDURE — 255N000001 HC RX 255: Performed by: RADIOLOGY

## 2022-02-21 PROCEDURE — 74220 X-RAY XM ESOPHAGUS 1CNTRST: CPT

## 2022-02-21 RX ADMIN — ANTACID/ANTIFLATULENT 4 G: 380; 550; 10; 10 GRANULE, EFFERVESCENT ORAL at 13:17

## 2022-03-03 ENCOUNTER — TRANSFERRED RECORDS (OUTPATIENT)
Dept: HEALTH INFORMATION MANAGEMENT | Facility: CLINIC | Age: 42
End: 2022-03-03
Payer: COMMERCIAL

## 2022-03-31 ENCOUNTER — MYC MEDICAL ADVICE (OUTPATIENT)
Dept: FAMILY MEDICINE | Facility: CLINIC | Age: 42
End: 2022-03-31
Payer: COMMERCIAL

## 2022-03-31 DIAGNOSIS — R10.9 RIGHT SIDED ABDOMINAL PAIN: Primary | ICD-10-CM

## 2022-04-04 NOTE — TELEPHONE ENCOUNTER
Please see my chart message below     Please review and advise     Thank you     Azul Frausto RN, BSN  [facility]

## 2022-04-15 ENCOUNTER — HOSPITAL ENCOUNTER (OUTPATIENT)
Dept: CT IMAGING | Facility: CLINIC | Age: 42
Discharge: HOME | End: 2022-04-15
Attending: FAMILY MEDICINE | Admitting: FAMILY MEDICINE
Payer: COMMERCIAL

## 2022-04-15 DIAGNOSIS — R10.9 RIGHT SIDED ABDOMINAL PAIN: ICD-10-CM

## 2022-04-15 PROCEDURE — 74177 CT ABD & PELVIS W/CONTRAST: CPT

## 2022-04-15 PROCEDURE — 250N000009 HC RX 250: Performed by: FAMILY MEDICINE

## 2022-04-15 PROCEDURE — 250N000011 HC RX IP 250 OP 636: Performed by: FAMILY MEDICINE

## 2022-04-15 RX ORDER — IOPAMIDOL 755 MG/ML
500 INJECTION, SOLUTION INTRAVASCULAR ONCE
Status: COMPLETED | OUTPATIENT
Start: 2022-04-15 | End: 2022-04-15

## 2022-04-15 RX ADMIN — SODIUM CHLORIDE 48 ML: 9 INJECTION, SOLUTION INTRAVENOUS at 08:01

## 2022-04-15 RX ADMIN — IOPAMIDOL 72 ML: 755 INJECTION, SOLUTION INTRAVENOUS at 08:01

## 2022-05-02 ENCOUNTER — TRANSFERRED RECORDS (OUTPATIENT)
Dept: HEALTH INFORMATION MANAGEMENT | Facility: CLINIC | Age: 42
End: 2022-05-02
Payer: COMMERCIAL

## 2022-05-05 ENCOUNTER — TRANSFERRED RECORDS (OUTPATIENT)
Dept: HEALTH INFORMATION MANAGEMENT | Facility: CLINIC | Age: 42
End: 2022-05-05
Payer: COMMERCIAL

## 2022-05-11 ENCOUNTER — TRANSFERRED RECORDS (OUTPATIENT)
Dept: HEALTH INFORMATION MANAGEMENT | Facility: CLINIC | Age: 42
End: 2022-05-11
Payer: COMMERCIAL

## 2022-08-11 ENCOUNTER — OFFICE VISIT (OUTPATIENT)
Dept: FAMILY MEDICINE | Facility: CLINIC | Age: 42
End: 2022-08-11
Payer: COMMERCIAL

## 2022-08-11 VITALS
BODY MASS INDEX: 25.83 KG/M2 | RESPIRATION RATE: 12 BRPM | OXYGEN SATURATION: 100 % | HEIGHT: 65 IN | DIASTOLIC BLOOD PRESSURE: 70 MMHG | TEMPERATURE: 98.6 F | SYSTOLIC BLOOD PRESSURE: 112 MMHG | WEIGHT: 155 LBS | HEART RATE: 71 BPM

## 2022-08-11 DIAGNOSIS — Z13.220 SCREENING FOR HYPERLIPIDEMIA: ICD-10-CM

## 2022-08-11 DIAGNOSIS — Z00.00 ROUTINE GENERAL MEDICAL EXAMINATION AT A HEALTH CARE FACILITY: Primary | ICD-10-CM

## 2022-08-11 DIAGNOSIS — E03.9 ACQUIRED HYPOTHYROIDISM: ICD-10-CM

## 2022-08-11 DIAGNOSIS — Z12.4 SCREENING FOR CERVICAL CANCER: ICD-10-CM

## 2022-08-11 DIAGNOSIS — Z13.1 SCREENING FOR DIABETES MELLITUS: ICD-10-CM

## 2022-08-11 PROCEDURE — 87624 HPV HI-RISK TYP POOLED RSLT: CPT | Performed by: FAMILY MEDICINE

## 2022-08-11 PROCEDURE — 99395 PREV VISIT EST AGE 18-39: CPT | Performed by: FAMILY MEDICINE

## 2022-08-11 PROCEDURE — G0145 SCR C/V CYTO,THINLAYER,RESCR: HCPCS | Performed by: FAMILY MEDICINE

## 2022-08-11 ASSESSMENT — ENCOUNTER SYMPTOMS
MYALGIAS: 0
WEAKNESS: 0
EYE PAIN: 0
FREQUENCY: 0
NAUSEA: 0
DIARRHEA: 0
HEMATURIA: 0
NERVOUS/ANXIOUS: 1
CONSTIPATION: 0
ARTHRALGIAS: 0
HEMATOCHEZIA: 0
BREAST MASS: 0
FEVER: 0
DYSURIA: 0
HEARTBURN: 0
COUGH: 0
SORE THROAT: 0
DIZZINESS: 0
ABDOMINAL PAIN: 0
HEADACHES: 0
PALPITATIONS: 0
SHORTNESS OF BREATH: 0
CHILLS: 0
PARESTHESIAS: 0
JOINT SWELLING: 0

## 2022-08-11 ASSESSMENT — PAIN SCALES - GENERAL: PAINLEVEL: NO PAIN (0)

## 2022-08-11 NOTE — PROGRESS NOTES
SUBJECTIVE:   CC: Laurie Diaz is an 41 year old woman who presents for preventive health visit.     Patient has been advised of split billing requirements and indicates understanding: Yes     Healthy Habits:     Getting at least 3 servings of Calcium per day:  Yes    Bi-annual eye exam:  Yes    Dental care twice a year:  Yes    Sleep apnea or symptoms of sleep apnea:  Daytime drowsiness    Diet:  Regular (no restrictions)    Frequency of exercise:  4-5 days/week    Duration of exercise:  15-30 minutes    Taking medications regularly:  Yes    Medication side effects:  None    PHQ-2 Total Score: 0    Additional concerns today:  No      Today's PHQ-2 Score:   PHQ-2 ( 1999 [employer]) 8/11/2022   Q1: Little interest or pleasure in doing things 0   Q2: Feeling down, depressed or hopeless 0   PHQ-2 Score 0   PHQ-2 Total Score (12-17 Years)- Positive if 3 or more points; Administer PHQ-A if positive -   Q1: Little interest or pleasure in doing things Not at all   Q2: Feeling down, depressed or hopeless Not at all   PHQ-2 Score 0       Abuse: Current or Past (Physical, Sexual or Emotional) - NO  Do you feel safe in your environment? YES      Social History     Tobacco Use     Smoking status: Never Smoker     Smokeless tobacco: Never Used   Substance Use Topics     Alcohol use: No     Alcohol/week: 0.0 standard drinks     Comment: infrequent     If you drink alcohol do you typically have >3 drinks per day or >7 drinks per week? No    Alcohol Use 8/11/2022   Prescreen: >3 drinks/day or >7 drinks/week? No   Prescreen: >3 drinks/day or >7 drinks/week? -       Reviewed orders with patient.  Reviewed health maintenance and updated orders accordingly - Yes  Lab work is in process    Breast Cancer Screening:    FHS-7:   Breast CA Risk Assessment (FHS-7) 8/11/2022   Did any of your first-degree relatives have breast or ovarian cancer? No   Did any of your relatives have bilateral breast cancer? Unknown   Did any man in your family  have breast cancer? No   Did any woman in your family have breast and ovarian cancer? Unknown   Did any woman in your family have breast cancer before age 50 y? No   Do you have 2 or more relatives with breast and/or ovarian cancer? Yes   Do you have 2 or more relatives with breast and/or bowel cancer? No       Pertinent mammograms are reviewed under the imaging tab.    History of abnormal Pap smear: NO - age 30-65 PAP every 5 years with negative HPV co-testing recommended  PAP / HPV Latest Ref Rng & Units 11/20/2018 7/20/2015   PAP (Historical) - NIL nil   HPV16 NEG:Negative Negative -   HPV18 NEG:Negative Negative -   HRHPV NEG:Negative Negative -     Reviewed and updated as needed this visit by clinical staff   Tobacco  Allergies  Meds   Med Hx  Surg Hx  Fam Hx  Soc Hx          Reviewed and updated as needed this visit by Provider   Tobacco                 Past Medical History:   Diagnosis Date     Anxiety 09/19/2018     Complication of anesthesia      Hypothyroidism      Motion sickness      Paresthesia      PONV (postoperative nausea and vomiting)      Rectal bleeding - resolved, likely secondary to fissure 05/06/2019      Past Surgical History:   Procedure Laterality Date     DAVINCI SALPINGECTOMY Left 11/24/2020    Procedure: Robotic assisted laparoscopic left ovarian cystectomy, bilateral salpingectomy;  Surgeon: Rosalba Pierre MD;  Location: [facility] OR     WRIST SURGERY  08/2015    Wrist surgery for torn ligaments       Review of Systems   Constitutional: Negative for chills and fever.   HENT: Negative for congestion, ear pain, hearing loss and sore throat.    Eyes: Negative for pain and visual disturbance.   Respiratory: Negative for cough and shortness of breath.    Cardiovascular: Negative for chest pain, palpitations and peripheral edema.   Gastrointestinal: Negative for abdominal pain, constipation, diarrhea, heartburn, hematochezia and nausea.   Breasts:  Positive for tenderness.  "Negative for breast mass and discharge.   Genitourinary: Positive for urgency. Negative for dysuria, frequency, genital sores, hematuria, pelvic pain, vaginal bleeding and vaginal discharge.   Musculoskeletal: Negative for arthralgias, joint swelling and myalgias.   Skin: Negative for rash.   Neurological: Negative for dizziness, weakness, headaches and paresthesias.   Psychiatric/Behavioral: Negative for mood changes. The patient is nervous/anxious.          OBJECTIVE:   /70   Pulse 71   Temp 98.6  F (37  C) (Tympanic)   Resp 12   Ht 1.651 m (5' 5\")   Wt 70.3 kg (155 lb)   LMP 07/25/2022 (Approximate)   SpO2 100%   BMI 25.79 kg/m    Physical Exam  GENERAL: healthy, alert and no distress  EYES: Eyes grossly normal to inspection  HENT: ear canals and TM's normal, nose and mouth without ulcers or lesions  NECK: no adenopathy and no asymmetry, masses, or scars  RESP: lungs clear to auscultation - no rales, rhonchi or wheezes  CV: regular rate and rhythm, normal S1 S2, no S3 or S4, no murmur, click or rub  ABDOMEN: soft, nontender, no hepatosplenomegaly, no masses and bowel sounds normal   (female): normal female external genitalia, normal urethral meatus , vaginal mucosa pink, moist, well rugated and normal cervix  MS: no gross musculoskeletal defects noted, no edema  SKIN: no suspicious lesions or rashes  PSYCH: mentation appears normal, affect normal/bright    Diagnostic Test Results:  Labs reviewed in Epic    ASSESSMENT/PLAN:       ICD-10-CM    1. Routine general medical examination at a health care facility  Z00.00    2. Acquired hypothyroidism  E03.9 TSH with free T4 reflex   3. Screening for diabetes mellitus  Z13.1 Basic metabolic panel  (Ca, Cl, CO2, Creat, Gluc, K, Na, BUN)   4. Screening for hyperlipidemia  Z13.220 Lipid panel reflex to direct LDL Fasting   5. Screening for cervical cancer  Z12.4 Pap screen with HPV - recommended age 30 - 65 years       Patient has been advised of split " "billing requirements and indicates understanding: Yes    COUNSELING:  Reviewed preventive health counseling, as reflected in patient instructions    Estimated body mass index is 25.79 kg/m  as calculated from the following:    Height as of this encounter: 1.651 m (5' 5\").    Weight as of this encounter: 70.3 kg (155 lb).    She reports that she has never smoked. She has never used smokeless tobacco.      Counseling Resources:  ATP IV Guidelines  Pooled Cohorts Equation Calculator  Breast Cancer Risk Calculator  BRCA-Related Cancer Risk Assessment: FHS-7 Tool  FRAX Risk Assessment  ICSI Preventive Guidelines  Dietary Guidelines for Americans, 2010  USDA's MyPlate  ASA Prophylaxis  Lung CA Screening    Luciano Pedraza, [facility]  "

## 2022-08-12 ENCOUNTER — LAB (OUTPATIENT)
Dept: LAB | Facility: CLINIC | Age: 42
End: 2022-08-12
Payer: COMMERCIAL

## 2022-08-12 DIAGNOSIS — Z13.1 SCREENING FOR DIABETES MELLITUS: ICD-10-CM

## 2022-08-12 DIAGNOSIS — E03.9 ACQUIRED HYPOTHYROIDISM: ICD-10-CM

## 2022-08-12 DIAGNOSIS — Z13.220 SCREENING FOR HYPERLIPIDEMIA: ICD-10-CM

## 2022-08-12 PROCEDURE — 84443 ASSAY THYROID STIM HORMONE: CPT

## 2022-08-12 PROCEDURE — 80061 LIPID PANEL: CPT

## 2022-08-12 PROCEDURE — 36415 COLL VENOUS BLD VENIPUNCTURE: CPT

## 2022-08-12 PROCEDURE — 80048 BASIC METABOLIC PNL TOTAL CA: CPT

## 2022-08-13 LAB
ANION GAP SERPL CALCULATED.3IONS-SCNC: 6 MMOL/L (ref 3–14)
BUN SERPL-MCNC: 14 MG/DL (ref 7–30)
CALCIUM SERPL-MCNC: 9.1 MG/DL (ref 8.5–10.1)
CHLORIDE BLD-SCNC: 108 MMOL/L (ref 94–109)
CHOLEST SERPL-MCNC: 155 MG/DL
CO2 SERPL-SCNC: 25 MMOL/L (ref 20–32)
CREAT SERPL-MCNC: 0.53 MG/DL (ref 0.52–1.04)
FASTING STATUS PATIENT QL REPORTED: NORMAL
GFR SERPL CREATININE-BSD FRML MDRD: >90 ML/MIN/1.73M2
GLUCOSE BLD-MCNC: 87 MG/DL (ref 70–99)
HDLC SERPL-MCNC: 72 MG/DL
LDLC SERPL CALC-MCNC: 73 MG/DL
NONHDLC SERPL-MCNC: 83 MG/DL
POTASSIUM BLD-SCNC: 4.6 MMOL/L (ref 3.4–5.3)
SODIUM SERPL-SCNC: 139 MMOL/L (ref 133–144)
TRIGL SERPL-MCNC: 48 MG/DL
TSH SERPL DL<=0.005 MIU/L-ACNC: 1.88 MU/L (ref 0.4–4)

## 2022-08-15 LAB
BKR LAB AP GYN ADEQUACY: NORMAL
BKR LAB AP GYN INTERPRETATION: NORMAL
BKR LAB AP HPV REFLEX: NORMAL
BKR LAB AP LMP: NORMAL
BKR LAB AP PREVIOUS ABNORMAL: NORMAL
PATH REPORT.COMMENTS IMP SPEC: NORMAL
PATH REPORT.COMMENTS IMP SPEC: NORMAL
PATH REPORT.RELEVANT HX SPEC: NORMAL

## 2022-08-18 LAB
HUMAN PAPILLOMA VIRUS 16 DNA: NEGATIVE
HUMAN PAPILLOMA VIRUS 18 DNA: NEGATIVE
HUMAN PAPILLOMA VIRUS FINAL DIAGNOSIS: NORMAL
HUMAN PAPILLOMA VIRUS OTHER HR: NEGATIVE

## 2022-09-11 ENCOUNTER — HEALTH MAINTENANCE LETTER (OUTPATIENT)
Age: 42
End: 2022-09-11

## 2022-11-28 ENCOUNTER — MYC MEDICAL ADVICE (OUTPATIENT)
Dept: FAMILY MEDICINE | Facility: CLINIC | Age: 42
End: 2022-11-28

## 2022-12-02 ENCOUNTER — MYC MEDICAL ADVICE (OUTPATIENT)
Dept: OBGYN | Facility: CLINIC | Age: 42
End: 2022-12-02

## 2022-12-02 DIAGNOSIS — Z12.31 ENCOUNTER FOR SCREENING MAMMOGRAM FOR BREAST CANCER: Primary | ICD-10-CM

## 2022-12-02 DIAGNOSIS — Z87.42 HX OF OVARIAN CYST: ICD-10-CM

## 2022-12-02 NOTE — TELEPHONE ENCOUNTER
Please address the my chart message.  Would you like her to make an appt to see a provider?  TAWANA Lopez RN

## 2022-12-28 ENCOUNTER — HOSPITAL ENCOUNTER (OUTPATIENT)
Dept: MAMMOGRAPHY | Facility: CLINIC | Age: 42
Discharge: HOME | End: 2022-12-28
Attending: OBSTETRICS & GYNECOLOGY
Payer: COMMERCIAL

## 2022-12-28 ENCOUNTER — HOSPITAL ENCOUNTER (OUTPATIENT)
Dept: ULTRASOUND IMAGING | Facility: CLINIC | Age: 42
Discharge: HOME | End: 2022-12-28
Attending: OBSTETRICS & GYNECOLOGY
Payer: COMMERCIAL

## 2022-12-28 DIAGNOSIS — Z12.31 ENCOUNTER FOR SCREENING MAMMOGRAM FOR BREAST CANCER: ICD-10-CM

## 2022-12-28 DIAGNOSIS — Z87.42 HX OF OVARIAN CYST: ICD-10-CM

## 2022-12-28 PROCEDURE — 76830 TRANSVAGINAL US NON-OB: CPT

## 2022-12-28 PROCEDURE — 77067 SCR MAMMO BI INCL CAD: CPT

## 2023-01-26 ENCOUNTER — OFFICE VISIT (OUTPATIENT)
Dept: FAMILY MEDICINE | Facility: CLINIC | Age: 43
End: 2023-01-26
Payer: COMMERCIAL

## 2023-01-26 VITALS
WEIGHT: 168 LBS | HEIGHT: 65 IN | HEART RATE: 75 BPM | RESPIRATION RATE: 16 BRPM | TEMPERATURE: 98.5 F | SYSTOLIC BLOOD PRESSURE: 136 MMHG | BODY MASS INDEX: 27.99 KG/M2 | OXYGEN SATURATION: 100 % | DIASTOLIC BLOOD PRESSURE: 86 MMHG

## 2023-01-26 DIAGNOSIS — H69.93 DYSFUNCTION OF BOTH EUSTACHIAN TUBES: Primary | ICD-10-CM

## 2023-01-26 DIAGNOSIS — R51.9 NONINTRACTABLE HEADACHE, UNSPECIFIED CHRONICITY PATTERN, UNSPECIFIED HEADACHE TYPE: ICD-10-CM

## 2023-01-26 PROCEDURE — 99213 OFFICE O/P EST LOW 20 MIN: CPT | Performed by: FAMILY MEDICINE

## 2023-01-26 RX ORDER — FLUTICASONE PROPIONATE 50 MCG
2 SPRAY, SUSPENSION (ML) NASAL DAILY
Qty: 16 G | Refills: 0 | Status: SHIPPED | OUTPATIENT
Start: 2023-01-26 | End: 2023-02-20

## 2023-01-26 ASSESSMENT — ANXIETY QUESTIONNAIRES
6. BECOMING EASILY ANNOYED OR IRRITABLE: NOT AT ALL
7. FEELING AFRAID AS IF SOMETHING AWFUL MIGHT HAPPEN: NOT AT ALL
1. FEELING NERVOUS, ANXIOUS, OR ON EDGE: SEVERAL DAYS
3. WORRYING TOO MUCH ABOUT DIFFERENT THINGS: SEVERAL DAYS
GAD7 TOTAL SCORE: 3
GAD7 TOTAL SCORE: 3
2. NOT BEING ABLE TO STOP OR CONTROL WORRYING: SEVERAL DAYS
5. BEING SO RESTLESS THAT IT IS HARD TO SIT STILL: NOT AT ALL
GAD7 TOTAL SCORE: 3
8. IF YOU CHECKED OFF ANY PROBLEMS, HOW DIFFICULT HAVE THESE MADE IT FOR YOU TO DO YOUR WORK, TAKE CARE OF THINGS AT HOME, OR GET ALONG WITH OTHER PEOPLE?: NOT DIFFICULT AT ALL
4. TROUBLE RELAXING: NOT AT ALL
IF YOU CHECKED OFF ANY PROBLEMS ON THIS QUESTIONNAIRE, HOW DIFFICULT HAVE THESE PROBLEMS MADE IT FOR YOU TO DO YOUR WORK, TAKE CARE OF THINGS AT HOME, OR GET ALONG WITH OTHER PEOPLE: NOT DIFFICULT AT ALL
7. FEELING AFRAID AS IF SOMETHING AWFUL MIGHT HAPPEN: NOT AT ALL

## 2023-01-26 ASSESSMENT — ENCOUNTER SYMPTOMS: HEADACHES: 1

## 2023-01-26 ASSESSMENT — PATIENT HEALTH QUESTIONNAIRE - PHQ9
SUM OF ALL RESPONSES TO PHQ QUESTIONS 1-9: 2
SUM OF ALL RESPONSES TO PHQ QUESTIONS 1-9: 2
10. IF YOU CHECKED OFF ANY PROBLEMS, HOW DIFFICULT HAVE THESE PROBLEMS MADE IT FOR YOU TO DO YOUR WORK, TAKE CARE OF THINGS AT HOME, OR GET ALONG WITH OTHER PEOPLE: NOT DIFFICULT AT ALL

## 2023-01-26 NOTE — PROGRESS NOTES
Assessment & Plan     Dysfunction of both eustachian tubes  Possibly related due to underlying sinusitis, given symptom chronicity recommend starting Flonase and Augmentin.  ENT referral if not improving.  - fluticasone (FLONASE) 50 MCG/ACT nasal spray  Dispense: 16 g; Refill: 0  - amoxicillin-clavulanate (AUGMENTIN) 875-125 MG tablet  Dispense: 14 tablet; Refill: 0    Nonintractable headache, unspecified chronicity pattern, unspecified headache type  Differentials including tension headache; increased life stress, single mom.  Versus sinus headaches.        Return in about 1 week (around 2/2/2023) for If symptoms do not improve or gets worse..    Gigi Garrett MD  [facility]    Alyse Sullivan is a 42 year old, presenting for the following health issues:  Ear Problem (Bilateral ear pressure, causing HA's)      History of Present Illness       Reason for visit:  Ear discomfort headache  Symptom onset:  1-2 weeks ago  Symptom intensity:  Mild  Symptom progression:  Staying the same  Had these symptoms before:  No  What makes it worse:  Laying down    She eats 2-3 servings of fruits and vegetables daily.She consumes 0 sweetened beverage(s) daily.She exercises with enough effort to increase her heart rate 10 to 19 minutes per day.  She exercises with enough effort to increase her heart rate 4 days per week.   She is taking medications regularly.    Today's PHQ-9         PHQ-9 Total Score: 2    PHQ-9 Q9 Thoughts of better off dead/self-harm past 2 weeks :   Not at all    How difficult have these problems made it for you to do your work, take care of things at home, or get along with other people: Not difficult at all  Today's JAKE-7 Score: 3       Patient is a very pleasant 42-year-old female presents with 2 weeks of headaches, pressure in both ears with a whooshing disequilibrium feeling between her ears.    Child recently sick with a cold.    Review of Systems   Neurological: Positive for  "headaches.            Objective    /86 (BP Location: Right arm, Patient Position: Chair, Cuff Size: Adult Regular)   Pulse 75   Temp 98.5  F (36.9  C) (Oral)   Resp 16   Ht 1.651 m (5' 5\")   Wt 76.2 kg (168 lb)   LMP 01/10/2023   SpO2 100%   BMI 27.96 kg/m    Body mass index is 27.96 kg/m .  Physical Exam  Vitals reviewed.   Constitutional:       Appearance: She is not ill-appearing.   HENT:      Ears:      Comments: Bilateral tympanic membranes appear erythematous, bulging.  Bilateral mastoids normal.  No pre or postauricular adenopathy  Cardiovascular:      Rate and Rhythm: Normal rate and regular rhythm.   Pulmonary:      Effort: Pulmonary effort is normal.      Breath sounds: Normal breath sounds.                            "

## 2023-02-27 ENCOUNTER — TRANSFERRED RECORDS (OUTPATIENT)
Dept: HEALTH INFORMATION MANAGEMENT | Facility: CLINIC | Age: 43
End: 2023-02-27

## 2023-03-17 ENCOUNTER — E-VISIT (OUTPATIENT)
Dept: FAMILY MEDICINE | Facility: CLINIC | Age: 43
End: 2023-03-17
Payer: COMMERCIAL

## 2023-03-17 DIAGNOSIS — B37.31 CANDIDAL VULVOVAGINITIS: Primary | ICD-10-CM

## 2023-03-17 PROCEDURE — 99421 OL DIG E/M SVC 5-10 MIN: CPT | Performed by: FAMILY MEDICINE

## 2023-03-17 RX ORDER — FLUCONAZOLE 150 MG/1
150 TABLET ORAL
Qty: 3 TABLET | Refills: 0 | Status: SHIPPED | OUTPATIENT
Start: 2023-03-17 | End: 2023-03-24

## 2023-06-20 ENCOUNTER — HOSPITAL ENCOUNTER (OUTPATIENT)
Dept: MRI IMAGING | Facility: CLINIC | Age: 43
Discharge: HOME | End: 2023-06-20
Attending: FAMILY MEDICINE | Admitting: FAMILY MEDICINE
Payer: COMMERCIAL

## 2023-06-20 DIAGNOSIS — M54.31 RIGHT SIDED SCIATICA: ICD-10-CM

## 2023-06-20 PROCEDURE — 72148 MRI LUMBAR SPINE W/O DYE: CPT

## 2023-06-22 ENCOUNTER — MYC MEDICAL ADVICE (OUTPATIENT)
Dept: FAMILY MEDICINE | Facility: CLINIC | Age: 43
End: 2023-06-22
Payer: COMMERCIAL

## 2023-06-28 NOTE — TELEPHONE ENCOUNTER
Please see my chart message below     Please review and advise     Thank you     Azul Frausto RN, BSN  [facility]

## 2023-06-29 DIAGNOSIS — M54.31 RIGHT SIDED SCIATICA: ICD-10-CM

## 2023-06-29 DIAGNOSIS — M43.16 ANTEROLISTHESIS OF LUMBAR SPINE: Primary | ICD-10-CM

## 2023-06-30 ASSESSMENT — ENCOUNTER SYMPTOMS
TREMORS: 0
LOSS OF CONSCIOUSNESS: 0
NUMBNESS: 1
STIFFNESS: 1
MUSCLE CRAMPS: 1
DIZZINESS: 0
SPEECH CHANGE: 0
HEADACHES: 0
NECK PAIN: 1
HOARSE VOICE: 0
DISTURBANCES IN COORDINATION: 0
TINGLING: 1
SORE THROAT: 0
TASTE DISTURBANCE: 0
SEIZURES: 0
ARTHRALGIAS: 0
TROUBLE SWALLOWING: 0
NECK MASS: 0
JOINT SWELLING: 0
SMELL DISTURBANCE: 0
PARALYSIS: 0
SINUS PAIN: 0
WEAKNESS: 0
MEMORY LOSS: 0
MYALGIAS: 1
SINUS CONGESTION: 0
BACK PAIN: 1

## 2023-07-03 ENCOUNTER — OFFICE VISIT (OUTPATIENT)
Dept: PHYSICAL MEDICINE AND REHAB | Facility: CLINIC | Age: 43
End: 2023-07-03
Attending: FAMILY MEDICINE
Payer: COMMERCIAL

## 2023-07-03 VITALS
HEIGHT: 65 IN | SYSTOLIC BLOOD PRESSURE: 128 MMHG | DIASTOLIC BLOOD PRESSURE: 77 MMHG | HEART RATE: 77 BPM | BODY MASS INDEX: 28.64 KG/M2 | WEIGHT: 171.9 LBS

## 2023-07-03 DIAGNOSIS — M43.16 ANTEROLISTHESIS OF LUMBAR SPINE: ICD-10-CM

## 2023-07-03 DIAGNOSIS — M54.31 RIGHT SIDED SCIATICA: ICD-10-CM

## 2023-07-03 PROCEDURE — 99204 OFFICE O/P NEW MOD 45 MIN: CPT | Performed by: NURSE PRACTITIONER

## 2023-07-03 ASSESSMENT — PAIN SCALES - GENERAL: PAINLEVEL: MODERATE PAIN (4)

## 2023-07-03 NOTE — LETTER
7/3/2023         RE: Laurie Diaz  [address] [address]        Dear Colleague,    Thank you for referring your patient, Laurie Diaz, to the [facility]. Please see a copy of my visit note below.    ASSESSMENT: Laurie Diaz is a 42 year old female who presents for consultation at the request of PCP Luciano Pedraza, with a past medical history significant for hypothyroidism who presents today for new patient evaluation of:    -lumbar anterolisthesis    Laurie c/o 6 mos of R lumbar radiculopathy without response to tylenol or medrol dose pack.     Patient is neurologically intact on exam. No myelopathic or red flag symptoms.  We reviewed her lumbar MRI. She has progressed mild to moderate bordering on moderate bilateral lateral recess stenosis and moderate left and mild/mild to moderate right neural foraminal stenosis at L4-L5. While her described distribution of leg pain doesn't exactly match classic L5 dermatome, I do expect her R leg pain is related. There was no neural foraminal narrowing at L5/S1. We talked about the edema L4-5 facets, which is likely reactive in nature. The radiologist's report of a new L4-5 listhesis I think is an overread. It is quite slight, if present. I do not feel any additional imaging is indicated at this time. We will watch the numbness/tingling in the left toes. We could consider an EMG if progresses.  I recommend exhausting conservative care in a stepwise approach. If leg pain does not respond to full course of PT, could plan on lumbar CHRISTIANO. Will see Laurie after 6wks of PT and can discuss CHRISTIANO in more detail at that time.         6/30/2023     8:15 AM   OSWESTRY DISABILITY INDEX   Count 10   Sum 13   Oswestry Score (%) 26 %            Diagnoses and all orders for this visit:  Anterolisthesis of lumbar spine  -     Spine  Referral  Right sided sciatica  -     Spine  Referral  -     Physical Therapy Referral;  Future      PLAN:  Reviewed spine anatomy and disease process. Discussed diagnosis and treatment options with the patient today. A shared decision making model was used.  The patient's values and choices were respected. The following represents what was discussed and decided upon by the provider and the patient.      -DIAGNOSTIC TESTS:  Images were personally reviewed and interpreted and explained to patient today using a spine model.   --I do not recommend adding any additional imaging at this time.    -PHYSICAL THERAPY:   -I referred Laurie for a full course of physical therapy   Discussed the importance of core strengthening, ROM, stretching exercises with the patient and how each of these entities is important in decreasing pain.  Explained to the patient that the purpose of physical therapy is to teach the patient a home exercise program.  These exercises need to be performed every day in order to decrease pain and prevent future occurrences of pain.        -MEDICATIONS:     Discussed medication options today with patient. At this point, would suggest PRN OTC NSAID such as ibuprofen or aleve. I do not feel starting treatment with a chronic NSAID would be of benefit given longevity of pain and lack of response to Medrol pack. Patient verbalized understanding.    -INTERVENTIONS:    We briefly discussed the idea of a lumbar CHRISTIANO if her leg pain were to either not improve or if it were to worsen prior to next appt. Did not review risks of CHRISTIANO. Did briefly review benefits of injections with patient today.    -PATIENT EDUCATION:  Total time of 15 minutes, on the day of service, spent with the patient, reviewing the chart, placing orders, and documenting.   -Today we also discussed the pros and cons of the current treatment plan.    -FOLLOW-UP:   with me in person after 6 wks of PT. Can cancel this appt if doing much better.    Advised patient to call the Spine Center if symptoms worsen, new numbness or weakness  develops in the legs, or if they develop new or worsening problems controlling bladder or bowel function.   ______________________________________________________________________    SUBJECTIVE:    HPI:  Laurie Diaz  Is a 42 year old female hx hypothyroidism who presents today for new patient evaluation of low back pain radiating into the right leg. Symptoms have been going on for the last 8+ mos since fall of 2022. No injury noted. Came on gradually. Describes the pain as burning. Pain radiates from the right buttock into the right calf and sometimes the bottom of the right foot. Sometimes radiates into the back of the thigh, but not as bad. The pain is worse after exercise and first thing in the morning. She runs frequetly on a treadmill, bought an elliptical and now exercising is becoming unbearable. Kept thinking it would go away, but it hasn't. On her left side, the central two toes are numb. She has tried eat and ice, tylenol, stretches, rest, and medrol dose pack at the direction of her PCP after a recent virtual visit on 6/13, which did not provide any relief.     No history back surgery or injections.  Denies radicular leg numbness, weakness, or change in bowel or bladder control.  She is here with her two kids today.    -Treatment to Date:     -Medications:  -tylenol  -medrol dose pack    Current Outpatient Medications   Medication     levothyroxine (SYNTHROID/LEVOTHROID) 50 MCG tablet     clobetasol (TEMOVATE) 0.05 % external cream     fluticasone (FLONASE) 50 MCG/ACT nasal spray     methylPREDNISolone (MEDROL DOSEPAK) 4 MG tablet therapy pack     pantoprazole (PROTONIX) 40 MG EC tablet     TRIMO-MCLAUGHLIN 0.025-0.01 % GEL vaginal gel     No current facility-administered medications for this visit.       No Known Allergies    Past Medical History:   Diagnosis Date     Anxiety 09/19/2018     Complication of anesthesia      Hypothyroidism      Motion sickness      Paresthesia      PONV (postoperative nausea and  vomiting)      Rectal bleeding - resolved, likely secondary to fissure 05/06/2019        Patient Active Problem List   Diagnosis     Acquired hypothyroidism     Complex cyst of left ovary     Request for sterilization     Pelvic floor dysfunction       Past Surgical History:   Procedure Laterality Date     DAVINCI SALPINGECTOMY Left 11/24/2020    Procedure: Robotic assisted laparoscopic left ovarian cystectomy, bilateral salpingectomy;  Surgeon: Rosalba Pierre MD;  Location: [facility] OR     WRIST SURGERY  08/2015    Wrist surgery for torn ligaments       Family History   Problem Relation Age of Onset     Hypothyroidism Mother      Thyroid Disease Mother      Hypertension Father      Heart Disease Father         heart attack     Diabetes Father      No Known Problems Sister      No Known Problems Daughter      No Known Problems Son      Ovarian Cancer Paternal Aunt      Ovarian Cancer Paternal Grandmother        Reviewed past medical, surgical, and family history with patient found on new patient intake packet located in EMR [employer] tab.     SOCIAL HX: nonsmoker, alconol occasional    ROS: no . Specifically negative for bowel/bladder dysfunction, balance changes, headache, dizziness, foot drop, fevers, chills, appetite changes, nausea/vomiting, unexplained weight loss. Otherwise 13 systems reviewed are negative. Please see the patient's intake questionnaire from today for details.      Answers for HPI/ROS submitted by the patient on 6/30/2023  General Symptoms: No  Skin Symptoms: No  HENT Symptoms: Yes  EYE SYMPTOMS: No  HEART SYMPTOMS: No  LUNG SYMPTOMS: No  INTESTINAL SYMPTOMS: No  URINARY SYMPTOMS: No  GYNECOLOGIC SYMPTOMS: No  BREAST SYMPTOMS: No  SKELETAL SYMPTOMS: Yes  BLOOD SYMPTOMS: No  NERVOUS SYSTEM SYMPTOMS: Yes  MENTAL HEALTH SYMPTOMS: No  Ear pain: Yes  Ear discharge: No  Hearing loss: No  Tinnitus: No  Nosebleeds: No  Congestion: No  Sinus pain: No  Trouble swallowing: No   Voice hoarseness:  "No  Mouth sores: No  Sore throat: No  Tooth pain: No  Gum tenderness: No  Bleeding gums: No  Change in taste: No  Change in sense of smell: No  Dry mouth: No  Hearing aid used: No  Neck lump: No  Back pain: Yes  Muscle aches: Yes  Neck pain: Yes  Swollen joints: No  Joint pain: No  Bone pain: No  Muscle cramps: Yes  Joint stiffness: Yes  Bone fracture: No  Trouble with coordination: No  Dizziness or trouble with balance: No  Fainting or black-out spells: No  Memory loss: No  Headache: No  Seizures: No  Speech problems: No  Tingling: Yes  Tremor: No  Weakness: No  Difficulty walking: No  Paralysis: No  Numbness: Yes    OBJECTIVE:  /77   Pulse 77   Ht 5' 5\" (1.651 m)   Wt 171 lb 14.4 oz (78 kg)   BMI 28.61 kg/m      PHYSICAL EXAMINATION:    --CONSTITUTIONAL:  Vital signs as above.  No acute distress.  The patient is well nourished and well groomed.  --PSYCHIATRIC:  Appropriate mood and affect. The patient is awake, alert, oriented to person, place, time and answering questions appropriately with clear speech.    --SKIN:  Skin over the face, bilateral lower extremities, and posterior torso is clean, dry, intact without rashes.    --RESPIRATORY: Normal rhythm and effort. No abnormal accessory muscle breathing patterns noted.   --ABDOMINAL:  Non-distended.    --GROSS MOTOR: Gait is non-antalgic. Easily arises from a seated position. Toe walking and heel walking are normal without significant difficulty.      --LOWER EXTREMITY MOTOR TESTING:  Hip flexion: left 5/5, right 5/5  Quads: left 5/5, right 5/5  Hamstrings: left 5/5, right 5/5  Dorsiflexion: left 5/5, right 5/5  Plantar flexion: left 5/5, right 5/5    Great toe MTP extension/EHL: left 5/5, right 5/5    --NEUROLOGICAL:  2/4 patellar, medial hamstring, and achilles reflexes bilaterally. Sensation to light touch is decreased to light touch left 2-3rd toes. Otherwise intact throughout both lower extremities. No clonus.      --STANDING EXAMINATION:  Normal " lumbar lordosis noted, no lateral shift.    --MUSCULOSKELETAL: Lumbar spine inspection reveals no evidence of deformity. Range of motion is not limited in lumbar flexion, extension, lateral rotation. No point tenderness to palpation lumbar spine. No paraspinal musculature tenderness. Straight leg raising is negative.    --HIPS: Full range of motion bilaterally. Negative hip joint tenderness to palp.    --SACROILIAC JOINT: Some tenderness to R SI joint area    --VASCULAR:  Bilateral lower extremities are warm without any discoloration.  There is no pitting edema of the bilateral lower extremities.    RESULTS:   Prior medical records from [facility] Everywhere were reviewed today.    Imaging: Spine imaging was personally reviewed and interpreted today. The images were shown to the patient and the findings were explained using a spine model.      MR Lumbar Spine w/o Contrast    Result Date: 6/20/2023  MRI LUMBAR SPINE WITHOUT CONTRAST   6/20/2023 10:14 AM HISTORY: 6-month history of right-sided sciatica symptoms. TECHNIQUE: Multiplanar multisequence MRI of the lumbar spine without contrast. COMPARISON: MRI lumbar spine 1/19/2021. FINDINGS: Nomenclature is based on 5 lumbar vertebral bodies. Normal vertebral body heights. New mild anterolisthesis of L4 on L5 measuring up to approximately 2 mm. Sagittal alignment otherwise normal. Patchy edema-like marrow signal changes centered about the bilateral L4-L5 facet joints, nonspecific, but likely reactive in the setting of degenerative facet disease with possible active/inflammatory component. These signal changes were also present on the previous study. Bone marrow signal otherwise appears normal. Normal appearance of the distal spinal cord with the conus terminating at L1. The paraspinous soft tissues and bony pelvis appear grossly unremarkable. Segmental analysis: T12-L1: Normal disc height and signal. No herniation. Normal facets. No spinal canal or neural  foraminal stenosis. No change. L1-L2: Normal disc height and signal. No herniation. Normal facets. No spinal canal or neural foraminal stenosis. No change. L2-L3: Normal disc height and signal. No herniation. Normal facets. No significant spinal canal or neural foraminal stenosis. L3-L4: Normal disc height and signal. No herniation. Mild facet arthropathy. No significant spinal canal stenosis. No significant neural foraminal stenosis. Overall, no change. L4-L5: Disc desiccation with mild disc height loss, new/slightly progressed. There is slight uncovering of the posterior aspect of the disc related to degenerative spondylolisthesis. There is a diffuse disc bulge, with possible superimposed small left foraminal disc herniation component (series 2 image 16) versus eccentric to the left disc bulging component. Moderate to severe bilateral facet arthropathy, as before. There is progressed mild to moderate bordering on moderate bilateral lateral recess narrowing with slight contour deformity of the traversing bilateral L5 nerve roots (series 6 image 43). No significant central spinal canal stenosis. There has been interval progression of moderate left and mild bordering on mild to moderate right neural foraminal stenosis. There is some contour deformity of the exiting left L4 nerve root and contact of the exiting right L4 nerve root. L5-S1: Normal disc height and signal. No herniation. Mild to moderate facet arthropathy. No significant spinal canal or neural foraminal stenosis. Overall, no change.     IMPRESSION: 1. New mild grade 1 degenerative anterolisthesis of L4 on L5. Persistent patchy edema-like marrow signal change centered about the L4-L5 facet joints, nonspecific, but presumably reactive in the setting of degenerative facet disease with possible active/inflammatory component. There is associated progressed mild to moderate bordering on moderate bilateral lateral recess stenosis and moderate left and mild/mild  to moderate right neural foraminal stenosis at L4-L5. 2. Otherwise, no significant change. Please see the body of report for additional details. ALENA DOHERTY MD   SYSTEM ID:  [id number]        [facility]  [facility]  O. [phone]      Again, thank you for allowing me to participate in the care of your patient.        Sincerely,        NIRAJ Robert CNP

## 2023-07-03 NOTE — PATIENT INSTRUCTIONS
"Radicular Pain    Radicular pain in either the arm or leg is usually from a bulging disc in the spine. A portion of the herniated disc may press against the nerves as the nerves exit the spine. This causes pain which is felt down the arm or leg. Other causes of radicular pain may include:  Fractures.  Heart disease.  Cancer.  An abnormal and usually degenerative state of the nervous system or nerves (neuropathy).    In most cases, radicular pain is treated without imaging unless symptoms do not start to improve. If that is the case, your provider may order a CT or MRI scan to determine the cause.     Nerves in the cervical spine (neck) may cause radicular pain into the outer shoulder and down the arm. It can spread down to the thumb and fingers. The symptoms vary depending on which nerve root has been affected. In most cases, radicular pain improves with conservative treatment such as physical therapy, cervical traction, medications, and epidural steroid injections. A program for neck rehabilitation with stretching and strengthening exercises is an important part of management. Treatment may take months, and surgery may be considered as a last resort if the symptoms do not improve.    Nerves in the lumbar spine (lower back) may cause radicular pain into the hip and down the leg. The commonly used term for this type of pain is \"sciatica\". Conservative treatment is also recommended for this problem. Most patients feel better after 2 to 4 weeks of rest and other supportive care. You should avoid bending, lifting, and all other activities which can make your pain worse. Physical therapy, traction, medications, and epidural steroid injections can be good options to help with your recovery. A program for back injury rehabilitation with stretching and strengthening exercises is an important part of management. Surgery may be considered as a last resort if symptoms do not improve with conservative treatment.     You may " take over-the-counter or prescription medicines for pain, discomfort, or fever as directed by your caregiver. Muscle relaxants may help by relieving more severe pain and spasm. Neuropathic medication (such as gabapentin, lyrica, or cymbalta) can help decrease your symptoms of nerve pain as well. Cold or massage can also give significant relief. Spinal manipulation is not recommended as it can increase the degree of disc protrusion. We do not recommend taking narcotic medication such as percocet, oxycodone, norco, dilaudid, or others unless pain is severe and not controlled with any other oral options.    Epidural steroid injections are often effective treatment for radicular pain. These injections deliver strong anti-inflammatory medicine to the area directly around the nerve root in the space between your back bones (vertebrae). Your provider can give you more information about steroid injections. These injections are most effective when given within two weeks of the onset of acute pain. You should see your provider for follow up care as recommended.     In most cases, radicular pain is treated without imaging unless symptoms do not start to improve. If that is the case, your provider may order a CT or MRI scan to determine the cause.     SEEK IMMEDIATE MEDICAL CARE IF:  You develop increased pain, weakness, or numbness in your arm or leg.  You develop difficulty with bladder or bowel control.  You develop abdominal pain.    Document Released: 01/25/2006 Document Revised: 03/11/2013 Document Reviewed: 04/12/2010  Patient Information  2013 [employer].       ~You have been referred for Physical Therapy to [facility]. They will call you to schedule an appointment.      Scheduling phone number is [phone] for [facility], or [facility].  If you have not heard from the scheduling office within 2 business days, please call [phone] for ALL other  locations.    Discussed the importance of core strengthening, ROM, stretching exercises and how each of these entities is important in decreasing pain and improving long term spine health.  The purpose of physical therapy is to teach you an individualized home exercise program.  These exercises need to be performed every day in order to decrease pain and prevent future occurrences of pain.             Importance of specialized Physical Therapy:     Today, we discussed the importance of core strengthening, ROM, and stretching exercises, and how each of these are key in decreasing pain.   The purpose of physical therapy is to teach patients a home exercise program individualized to them and their specific health concerns.  These exercises need to be performed every day in order to decrease pain and prevent future occurrences of pain.

## 2023-07-03 NOTE — PROGRESS NOTES
ASSESSMENT: Laurie Diaz is a 42 year old female who presents for consultation at the request of PCP Luciano Pedraza, with a past medical history significant for hypothyroidism who presents today for new patient evaluation of:    -lumbar anterolisthesis    Laurie c/o 6 mos of R lumbar radiculopathy without response to tylenol or medrol dose pack.     Patient is neurologically intact on exam. No myelopathic or red flag symptoms.  We reviewed her lumbar MRI. She has progressed mild to moderate bordering on moderate bilateral lateral recess stenosis and moderate left and mild/mild to moderate right neural foraminal stenosis at L4-L5. While her described distribution of leg pain doesn't exactly match classic L5 dermatome, I do expect her R leg pain is related. There was no neural foraminal narrowing at L5/S1. We talked about the edema L4-5 facets, which is likely reactive in nature. The radiologist's report of a new L4-5 listhesis I think is an overread. It is quite slight, if present. I do not feel any additional imaging is indicated at this time. We will watch the numbness/tingling in the left toes. We could consider an EMG if progresses.  I recommend exhausting conservative care in a stepwise approach. If leg pain does not respond to full course of PT, could plan on lumbar CHRISTIANO. Will see Laurie after 6wks of PT and can discuss CHRISTIANO in more detail at that time.         6/30/2023     8:15 AM   OSWESTRY DISABILITY INDEX   Count 10   Sum 13   Oswestry Score (%) 26 %            Diagnoses and all orders for this visit:  Anterolisthesis of lumbar spine  -     Spine  Referral  Right sided sciatica  -     Spine  Referral  -     Physical Therapy Referral; Future      PLAN:  Reviewed spine anatomy and disease process. Discussed diagnosis and treatment options with the patient today. A shared decision making model was used.  The patient's values and choices were respected. The following represents what was  discussed and decided upon by the provider and the patient.      -DIAGNOSTIC TESTS:  Images were personally reviewed and interpreted and explained to patient today using a spine model.   --I do not recommend adding any additional imaging at this time.    -PHYSICAL THERAPY:   -I referred Laurie for a full course of physical therapy   Discussed the importance of core strengthening, ROM, stretching exercises with the patient and how each of these entities is important in decreasing pain.  Explained to the patient that the purpose of physical therapy is to teach the patient a home exercise program.  These exercises need to be performed every day in order to decrease pain and prevent future occurrences of pain.        -MEDICATIONS:     Discussed medication options today with patient. At this point, would suggest PRN OTC NSAID such as ibuprofen or aleve. I do not feel starting treatment with a chronic NSAID would be of benefit given longevity of pain and lack of response to Medrol pack. Patient verbalized understanding.    -INTERVENTIONS:    We briefly discussed the idea of a lumbar CHRISTIANO if her leg pain were to either not improve or if it were to worsen prior to next appt. Did not review risks of CHRISTIANO. Did briefly review benefits of injections with patient today.    -PATIENT EDUCATION:  Total time of 15 minutes, on the day of service, spent with the patient, reviewing the chart, placing orders, and documenting.   -Today we also discussed the pros and cons of the current treatment plan.    -FOLLOW-UP:   with me in person after 6 wks of PT. Can cancel this appt if doing much better.    Advised patient to call the [facility] Center if symptoms worsen, new numbness or weakness develops in the legs, or if they develop new or worsening problems controlling bladder or bowel function.   ______________________________________________________________________    SUBJECTIVE:    HPI:  Laurie Diaz  Is a 42 year old female hx hypothyroidism  who presents today for new patient evaluation of low back pain radiating into the right leg. Symptoms have been going on for the last 8+ mos since fall of 2022. No injury noted. Came on gradually. Describes the pain as burning. Pain radiates from the right buttock into the right calf and sometimes the bottom of the right foot. Sometimes radiates into the back of the thigh, but not as bad. The pain is worse after exercise and first thing in the morning. She runs frequetly on a treadmill, bought an elliptical and now exercising is becoming unbearable. Kept thinking it would go away, but it hasn't. On her left side, the central two toes are numb. She has tried eat and ice, tylenol, stretches, rest, and medrol dose pack at the direction of her PCP after a recent virtual visit on 6/13, which did not provide any relief.     No history back surgery or injections.  Denies radicular leg numbness, weakness, or change in bowel or bladder control.  She is here with her two kids today.    -Treatment to Date:     -Medications:  -tylenol  -medrol dose pack    Current Outpatient Medications   Medication    levothyroxine (SYNTHROID/LEVOTHROID) 50 MCG tablet    clobetasol (TEMOVATE) 0.05 % external cream    fluticasone (FLONASE) 50 MCG/ACT nasal spray    methylPREDNISolone (MEDROL DOSEPAK) 4 MG tablet therapy pack    pantoprazole (PROTONIX) 40 MG EC tablet    TRIMO-MCLAUGHLIN 0.025-0.01 % GEL vaginal gel     No current facility-administered medications for this visit.       No Known Allergies    Past Medical History:   Diagnosis Date    Anxiety 09/19/2018    Complication of anesthesia     Hypothyroidism     Motion sickness     Paresthesia     PONV (postoperative nausea and vomiting)     Rectal bleeding - resolved, likely secondary to fissure 05/06/2019        Patient Active Problem List   Diagnosis    Acquired hypothyroidism    Complex cyst of left ovary    Request for sterilization    Pelvic floor dysfunction       Past Surgical History:    Procedure Laterality Date    DAVINCI SALPINGECTOMY Left 11/24/2020    Procedure: Robotic assisted laparoscopic left ovarian cystectomy, bilateral salpingectomy;  Surgeon: Rosalba Pierre MD;  Location: [facility] OR    WRIST SURGERY  08/2015    Wrist surgery for torn ligaments       Family History   Problem Relation Age of Onset    Hypothyroidism Mother     Thyroid Disease Mother     Hypertension Father     Heart Disease Father         heart attack    Diabetes Father     No Known Problems Sister     No Known Problems Daughter     No Known Problems Son     Ovarian Cancer Paternal Aunt     Ovarian Cancer Paternal Grandmother        Reviewed past medical, surgical, and family history with patient found on new patient intake packet located in EMR [employer] tab.     SOCIAL HX: nonsmoker, alconol occasional    ROS: no . Specifically negative for bowel/bladder dysfunction, balance changes, headache, dizziness, foot drop, fevers, chills, appetite changes, nausea/vomiting, unexplained weight loss. Otherwise 13 systems reviewed are negative. Please see the patient's intake questionnaire from today for details.      Answers for HPI/ROS submitted by the patient on 6/30/2023  General Symptoms: No  Skin Symptoms: No  HENT Symptoms: Yes  EYE SYMPTOMS: No  HEART SYMPTOMS: No  LUNG SYMPTOMS: No  INTESTINAL SYMPTOMS: No  URINARY SYMPTOMS: No  GYNECOLOGIC SYMPTOMS: No  BREAST SYMPTOMS: No  SKELETAL SYMPTOMS: Yes  BLOOD SYMPTOMS: No  NERVOUS SYSTEM SYMPTOMS: Yes  MENTAL HEALTH SYMPTOMS: No  Ear pain: Yes  Ear discharge: No  Hearing loss: No  Tinnitus: No  Nosebleeds: No  Congestion: No  Sinus pain: No  Trouble swallowing: No   Voice hoarseness: No  Mouth sores: No  Sore throat: No  Tooth pain: No  Gum tenderness: No  Bleeding gums: No  Change in taste: No  Change in sense of smell: No  Dry mouth: No  Hearing aid used: No  Neck lump: No  Back pain: Yes  Muscle aches: Yes  Neck pain: Yes  Swollen joints: No  Joint pain: No  Bone pain:  "No  Muscle cramps: Yes  Joint stiffness: Yes  Bone fracture: No  Trouble with coordination: No  Dizziness or trouble with balance: No  Fainting or black-out spells: No  Memory loss: No  Headache: No  Seizures: No  Speech problems: No  Tingling: Yes  Tremor: No  Weakness: No  Difficulty walking: No  Paralysis: No  Numbness: Yes    OBJECTIVE:  /77   Pulse 77   Ht 5' 5\" (1.651 m)   Wt 171 lb 14.4 oz (78 kg)   BMI 28.61 kg/m      PHYSICAL EXAMINATION:    --CONSTITUTIONAL:  Vital signs as above.  No acute distress.  The patient is well nourished and well groomed.  --PSYCHIATRIC:  Appropriate mood and affect. The patient is awake, alert, oriented to person, place, time and answering questions appropriately with clear speech.    --SKIN:  Skin over the face, bilateral lower extremities, and posterior torso is clean, dry, intact without rashes.    --RESPIRATORY: Normal rhythm and effort. No abnormal accessory muscle breathing patterns noted.   --ABDOMINAL:  Non-distended.    --GROSS MOTOR: Gait is non-antalgic. Easily arises from a seated position. Toe walking and heel walking are normal without significant difficulty.      --LOWER EXTREMITY MOTOR TESTING:  Hip flexion: left 5/5, right 5/5  Quads: left 5/5, right 5/5  Hamstrings: left 5/5, right 5/5  Dorsiflexion: left 5/5, right 5/5  Plantar flexion: left 5/5, right 5/5    Great toe MTP extension/EHL: left 5/5, right 5/5    --NEUROLOGICAL:  2/4 patellar, medial hamstring, and achilles reflexes bilaterally. Sensation to light touch is decreased to light touch left 2-3rd toes. Otherwise intact throughout both lower extremities. No clonus.      --STANDING EXAMINATION:  Normal lumbar lordosis noted, no lateral shift.    --MUSCULOSKELETAL: Lumbar spine inspection reveals no evidence of deformity. Range of motion is not limited in lumbar flexion, extension, lateral rotation. No point tenderness to palpation lumbar spine. No paraspinal musculature tenderness. Straight " leg raising is negative.    --HIPS: Full range of motion bilaterally. Negative hip joint tenderness to palp.    --SACROILIAC JOINT: Some tenderness to R SI joint area    --VASCULAR:  Bilateral lower extremities are warm without any discoloration.  There is no pitting edema of the bilateral lower extremities.    RESULTS:   Prior medical records from [facility] and Care Everywhere were reviewed today.    Imaging: Spine imaging was personally reviewed and interpreted today. The images were shown to the patient and the findings were explained using a spine model.      MR Lumbar Spine w/o Contrast    Result Date: 6/20/2023  MRI LUMBAR SPINE WITHOUT CONTRAST   6/20/2023 10:14 AM HISTORY: 6-month history of right-sided sciatica symptoms. TECHNIQUE: Multiplanar multisequence MRI of the lumbar spine without contrast. COMPARISON: MRI lumbar spine 1/19/2021. FINDINGS: Nomenclature is based on 5 lumbar vertebral bodies. Normal vertebral body heights. New mild anterolisthesis of L4 on L5 measuring up to approximately 2 mm. Sagittal alignment otherwise normal. Patchy edema-like marrow signal changes centered about the bilateral L4-L5 facet joints, nonspecific, but likely reactive in the setting of degenerative facet disease with possible active/inflammatory component. These signal changes were also present on the previous study. Bone marrow signal otherwise appears normal. Normal appearance of the distal spinal cord with the conus terminating at L1. The paraspinous soft tissues and bony pelvis appear grossly unremarkable. Segmental analysis: T12-L1: Normal disc height and signal. No herniation. Normal facets. No spinal canal or neural foraminal stenosis. No change. L1-L2: Normal disc height and signal. No herniation. Normal facets. No spinal canal or neural foraminal stenosis. No change. L2-L3: Normal disc height and signal. No herniation. Normal facets. No significant spinal canal or neural foraminal stenosis. L3-L4:  Normal disc height and signal. No herniation. Mild facet arthropathy. No significant spinal canal stenosis. No significant neural foraminal stenosis. Overall, no change. L4-L5: Disc desiccation with mild disc height loss, new/slightly progressed. There is slight uncovering of the posterior aspect of the disc related to degenerative spondylolisthesis. There is a diffuse disc bulge, with possible superimposed small left foraminal disc herniation component (series 2 image 16) versus eccentric to the left disc bulging component. Moderate to severe bilateral facet arthropathy, as before. There is progressed mild to moderate bordering on moderate bilateral lateral recess narrowing with slight contour deformity of the traversing bilateral L5 nerve roots (series 6 image 43). No significant central spinal canal stenosis. There has been interval progression of moderate left and mild bordering on mild to moderate right neural foraminal stenosis. There is some contour deformity of the exiting left L4 nerve root and contact of the exiting right L4 nerve root. L5-S1: Normal disc height and signal. No herniation. Mild to moderate facet arthropathy. No significant spinal canal or neural foraminal stenosis. Overall, no change.     IMPRESSION: 1. New mild grade 1 degenerative anterolisthesis of L4 on L5. Persistent patchy edema-like marrow signal change centered about the L4-L5 facet joints, nonspecific, but presumably reactive in the setting of degenerative facet disease with possible active/inflammatory component. There is associated progressed mild to moderate bordering on moderate bilateral lateral recess stenosis and moderate left and mild/mild to moderate right neural foraminal stenosis at L4-L5. 2. Otherwise, no significant change. Please see the body of report for additional details. ALENA DOHERTY MD   SYSTEM ID:  [id number]        Raven PAREDES-C  [facility]  O. [phone]

## 2023-07-07 ENCOUNTER — THERAPY VISIT (OUTPATIENT)
Dept: PHYSICAL THERAPY | Facility: CLINIC | Age: 43
End: 2023-07-07
Attending: NURSE PRACTITIONER
Payer: COMMERCIAL

## 2023-07-07 DIAGNOSIS — M54.41 CHRONIC BILATERAL LOW BACK PAIN WITH RIGHT-SIDED SCIATICA: ICD-10-CM

## 2023-07-07 DIAGNOSIS — M54.31 RIGHT SIDED SCIATICA: ICD-10-CM

## 2023-07-07 DIAGNOSIS — G89.29 CHRONIC BILATERAL LOW BACK PAIN WITH RIGHT-SIDED SCIATICA: ICD-10-CM

## 2023-07-07 PROCEDURE — 97112 NEUROMUSCULAR REEDUCATION: CPT | Mod: GP | Performed by: SPECIALIST/TECHNOLOGIST

## 2023-07-07 PROCEDURE — 97110 THERAPEUTIC EXERCISES: CPT | Mod: GP | Performed by: SPECIALIST/TECHNOLOGIST

## 2023-07-07 PROCEDURE — 97530 THERAPEUTIC ACTIVITIES: CPT | Mod: GP | Performed by: SPECIALIST/TECHNOLOGIST

## 2023-07-07 PROCEDURE — 97161 PT EVAL LOW COMPLEX 20 MIN: CPT | Mod: GP | Performed by: SPECIALIST/TECHNOLOGIST

## 2023-07-07 NOTE — PROGRESS NOTES
PHYSICAL THERAPY EVALUATION  Type of Visit: Evaluation    See electronic medical record for Abuse and Falls Screening details.    Subjective      Presenting condition or subjective complaint: Sciatic Pain  Date of onset: 07/03/23    Relevant medical history: Pain at night or rest; Thyroid problems   Dates & types of surgery: 2015 wrist, 2020 ovarian cyst and fallopian tube remboval    Prior diagnostic imaging/testing results: MRI     Prior therapy history for the same diagnosis, illness or injury: No      Prior Level of Function   Transfers: Independent  Ambulation: Independent  ADL: Independent      Living Environment  Social support: With a significant other or spouse   Type of home: House   Stairs to enter the home: Yes   Is there a railing: Yes   Ramp: No   Stairs inside the home: Yes 20 Is there a railing: Yes   Help at home: None  Equipment owned:       Employment: Yes Teacher  Hobbies/Interests:      Patient goals for therapy: Sleep, move without pain, exercise    Pain assessment: Pain present     Objective   LUMBAR SPINE EVALUATION  PAIN: Pain is Exacerbated By: Movement  GAIT:   Weightbearing Status: WBAT  Assistive Device(s): None  Gait Deviations: WNL  BALANCE/PROPRIOCEPTION: WNL  WEIGHTBEARING ALIGNMENT: WNL  NON-WEIGHTBEARING ALIGNMENT: WNL   ROM: AROM WNL  PELVIC/SI SCREEN: WNL  STRENGTH: WNL; Reproduction of pain with resisted hip abduction and hip extension  MYOTOMES: WNL  DTR S: WNL  CORD SIGNS: WNL  DERMATOMES: WNL  NEURAL TENSION: Lumbar WNL  FLEXIBILITY: WNL  LUMBAR/HIP Special Tests: Some hip pain with FADIR, Lumbar AROM WNL and pain free   PELVIS/SI SPECIAL TESTS: WNL  PALPATION: Lumbar Palpation WNL, Only tender spot is deep gluteal region in R hip     Pt Lumbar MRI results were unremarkable with mild degenerative type changes. Based on exam today, I believe symptoms are related to the R posterior hip and not the lumbar spine. Will treat pt with combined core and hip exercises.     Assessment  & Plan   CLINICAL IMPRESSIONS   Medical Diagnosis: Right Sided Sciatica    Treatment Diagnosis: Low Back Pain   Impression/Assessment: Patient is a 42 year old female with Right Hip/low back pain complaints.  The following significant findings have been identified: Pain, Impaired muscle performance and Decreased activity tolerance. These impairments interfere with their ability to perform self care tasks, work tasks, recreational activities, household chores, driving , household mobility and community mobility as compared to previous level of function.     Clinical Decision Making (Complexity):   Clinical Presentation: Stable/Uncomplicated  Clinical Presentation Rationale: based on medical and personal factors listed in PT evaluation  Clinical Decision Making (Complexity): Low complexity    PLAN OF CARE  Treatment Interventions:  Interventions: Manual Therapy, Neuromuscular Re-education, Therapeutic Activity, Therapeutic Exercise, Self-Care/Home Management    Long Term Goals     PT Goal 1  Goal Description: Sleep without painful nightly disturbances  Rationale: to maximize safety and independence within the home;to maximize safety and independence with self cares;to maximize safety and independence with performance of ADLs and functional tasks  Target Date: 09/15/23      Frequency of Treatment: 1x/2wks  Duration of Treatment: 10 weeks    Recommended Referrals to Other Professionals:   Education Assessment:   Learner/Method: Patient    Risks and benefits of evaluation/treatment have been explained.   Patient/Family/caregiver agrees with Plan of Care.     Evaluation Time:     PT Eval, Low Complexity Minutes (49184): 10      Signing Clinician: Jean Bragg PT

## 2023-07-17 ENCOUNTER — OFFICE VISIT (OUTPATIENT)
Dept: OBGYN | Facility: CLINIC | Age: 43
End: 2023-07-17
Payer: COMMERCIAL

## 2023-07-17 VITALS — BODY MASS INDEX: 28.29 KG/M2 | WEIGHT: 170 LBS | SYSTOLIC BLOOD PRESSURE: 118 MMHG | DIASTOLIC BLOOD PRESSURE: 70 MMHG

## 2023-07-17 DIAGNOSIS — N92.0 INTERMENSTRUAL SPOTTING: Primary | ICD-10-CM

## 2023-07-17 LAB
HCG INTACT+B SERPL-ACNC: <1 MIU/ML
PROLACTIN SERPL 3RD IS-MCNC: 19 NG/ML (ref 5–23)
TSH SERPL DL<=0.005 MIU/L-ACNC: 2.86 UIU/ML (ref 0.3–4.2)

## 2023-07-17 PROCEDURE — 88175 CYTOPATH C/V AUTO FLUID REDO: CPT | Performed by: OBSTETRICS & GYNECOLOGY

## 2023-07-17 PROCEDURE — 84146 ASSAY OF PROLACTIN: CPT | Performed by: OBSTETRICS & GYNECOLOGY

## 2023-07-17 PROCEDURE — 84443 ASSAY THYROID STIM HORMONE: CPT | Performed by: OBSTETRICS & GYNECOLOGY

## 2023-07-17 PROCEDURE — 87624 HPV HI-RISK TYP POOLED RSLT: CPT | Performed by: OBSTETRICS & GYNECOLOGY

## 2023-07-17 PROCEDURE — 99214 OFFICE O/P EST MOD 30 MIN: CPT | Performed by: OBSTETRICS & GYNECOLOGY

## 2023-07-17 PROCEDURE — 87591 N.GONORRHOEAE DNA AMP PROB: CPT | Performed by: OBSTETRICS & GYNECOLOGY

## 2023-07-17 PROCEDURE — 84702 CHORIONIC GONADOTROPIN TEST: CPT | Performed by: OBSTETRICS & GYNECOLOGY

## 2023-07-17 PROCEDURE — 87491 CHLMYD TRACH DNA AMP PROBE: CPT | Performed by: OBSTETRICS & GYNECOLOGY

## 2023-07-17 PROCEDURE — 36415 COLL VENOUS BLD VENIPUNCTURE: CPT | Performed by: OBSTETRICS & GYNECOLOGY

## 2023-07-17 NOTE — PROGRESS NOTES
SUBJECTIVE:   CC: AUB                                               Laurie Diaz is a 42 year old  female who presents to clinic today for initial evaluation of AUB. Patient's last menstrual period was 2023. she reports spotting and cramping x6 days, not aligning with menses.  She is s/p bilateral salpingectomy.   She has never had spotting like this before. It is accompanied by cramping and breast tenderness.   She is nervous about cancer and wants everything done to evaluate for endometrial, cervical, and ovarian cancer.   Hx hypothyroidism, no recent labs.    She does have a 2nd degree relative with ovarian cancer    Lab Results   Component Value Date    PAP NIL, HPV neg 22    PAP NIL 2018    PAP nil 2015      Problem list and histories reviewed & adjusted, as indicated.  Additional history: as documented.    Patient Active Problem List   Diagnosis     Acquired hypothyroidism     Complex cyst of left ovary     Request for sterilization     Pelvic floor dysfunction     Right sided sciatica     Chronic bilateral low back pain with right-sided sciatica     Past Surgical History:   Procedure Laterality Date     DAVINCI SALPINGECTOMY Left 2020    Procedure: Robotic assisted laparoscopic left ovarian cystectomy, bilateral salpingectomy;  Surgeon: Rosalba Pierre MD;  Location: [facility] OR     WRIST SURGERY  2015    Wrist surgery for torn ligaments      Social History     Tobacco Use     Smoking status: Never     Smokeless tobacco: Never   Substance Use Topics     Alcohol use: No     Alcohol/week: 0.0 standard drinks of alcohol     Comment: infrequent      Problem (# of Occurrences) Relation (Name,Age of Onset)    Diabetes (1) Father (Jose Herrera)    Heart Disease (1) Father (Jose Herrera): heart attack    Hypertension (1) Father (Jose Herrera)    Thyroid Disease (1) Mother (Adina Herrera)    Ovarian Cancer (2) Paternal Aunt, Paternal Grandmother    Hypothyroidism (1) Mother  (Adina Herrera)    No Known Problems (3) Sister, Daughter, Son            clobetasol (TEMOVATE) 0.05 % external cream,   levothyroxine (SYNTHROID/LEVOTHROID) 50 MCG tablet, Take 1 tablet (50 mcg) by mouth daily    No current facility-administered medications on file prior to visit.    No Known Allergies    OBJECTIVE:   /70   Wt 77.1 kg (170 lb)   LMP 2023   BMI 28.29 kg/m     Const: sitting in chair in no acute distress, comfortable  Eyes: no scleral icterus, EOMI  CV: regular rate, well perfused  Pulm: no increased work of breathing, no cough  Skin: warm and dry, no rashes/lesions  Psych: mood stable, appropriate affect  Abd: soft, no hepatosplenomegaly, non-tender to palpation  Neuro: A+Ox3   : External genitalia normal well-estrogenized, healthy tissue.  No obvious excoriations, lesions, or rashes. Bartholins, urethra, normal.  Normal moist pink vaginal mucosa.  SSE: Normal cervix, moderate blood in the vault eminating from external os, no evidence of polyp or ectropion  Bimanual: No CMT, small mobile uterus. No adnexal masses or tenderness appreciated, some fullness of left adnexa.     ASSESSMENT/PLAN:                                                    Laurie Diaz is a 42 year old female  here for initial gyn evaluation of midcycle bleeding. We discussed the potential etiologies of AUB including hormonal changes (PCOS/oligoovulation, perimenopause), thyroid abnormality, structural abnormalities (polyps, fibroids), endometrial hyperplasia or carcinoma, adenomyosis. Patient counselled on evaluation.    1. Intermenstrual spotting  - will proceed with the following evaluation  - Pap imaged thin layer diagnostic reflex to HPV if ASCUS  - NEISSERIA GONORRHOEA PCR  - CHLAMYDIA TRACHOMATIS PCR  - TSH with free T4 reflex  - HCG quantitative pregnancy  - Prolactin     Plan endometrial biopsy at follow up if no other explanation for AUB detected.  Return to clinic after above work up, in the next  month.      Nevin Natarajan MD  Obstetrics and Gynecology   [facility]

## 2023-07-18 ENCOUNTER — ANCILLARY PROCEDURE (OUTPATIENT)
Dept: ULTRASOUND IMAGING | Facility: CLINIC | Age: 43
End: 2023-07-18
Attending: OBSTETRICS & GYNECOLOGY
Payer: COMMERCIAL

## 2023-07-18 DIAGNOSIS — N92.0 INTERMENSTRUAL SPOTTING: ICD-10-CM

## 2023-07-18 LAB
C TRACH DNA SPEC QL NAA+PROBE: NEGATIVE
N GONORRHOEA DNA SPEC QL NAA+PROBE: NEGATIVE

## 2023-07-18 PROCEDURE — 76856 US EXAM PELVIC COMPLETE: CPT

## 2023-07-19 ENCOUNTER — OFFICE VISIT (OUTPATIENT)
Dept: OBGYN | Facility: CLINIC | Age: 43
End: 2023-07-19
Payer: COMMERCIAL

## 2023-07-19 VITALS
DIASTOLIC BLOOD PRESSURE: 81 MMHG | BODY MASS INDEX: 28.16 KG/M2 | SYSTOLIC BLOOD PRESSURE: 137 MMHG | WEIGHT: 169.2 LBS | OXYGEN SATURATION: 100 % | HEART RATE: 75 BPM

## 2023-07-19 DIAGNOSIS — N92.0 INTERMENSTRUAL SPOTTING: Primary | ICD-10-CM

## 2023-07-19 LAB — HGB BLD-MCNC: 13.8 G/DL (ref 11.7–15.7)

## 2023-07-19 PROCEDURE — 58100 BIOPSY OF UTERUS LINING: CPT | Performed by: OBSTETRICS & GYNECOLOGY

## 2023-07-19 PROCEDURE — 99214 OFFICE O/P EST MOD 30 MIN: CPT | Mod: 25 | Performed by: OBSTETRICS & GYNECOLOGY

## 2023-07-19 PROCEDURE — 88305 TISSUE EXAM BY PATHOLOGIST: CPT | Performed by: PATHOLOGY

## 2023-07-19 PROCEDURE — 36415 COLL VENOUS BLD VENIPUNCTURE: CPT | Performed by: OBSTETRICS & GYNECOLOGY

## 2023-07-19 PROCEDURE — 85018 HEMOGLOBIN: CPT | Performed by: OBSTETRICS & GYNECOLOGY

## 2023-07-19 NOTE — PROGRESS NOTES
Laurie is a 42 year old  who is here today with complaint of abnormal uterine bleeding.    Her LMP started 2023.   She states she has had intermenstrual bleeding that started as light spotting, when she wiped.  On Thursday last week she had cramps, bloating and passed a clot.  She also had bleeding a couple  Of days ago and she bled through her shorts.  She did not need to wear paper products.  Other than when she bled through her shorts, she did not have heavy bleeding.  She does not have any known aggravating or alleviating factors.  She did not have any dizziness, shortness of breath, no new fatigue symptoms or chest pain.  She did have associated breast tenderness.        EXAM: US PELVIC TRANSABDOMINAL AND TRANSVAGINAL  LOCATION: [facility]  DATE: 2023  INDICATION:  Intermenstrual spotting  COMPARISON: Ultrasound 2022 and 2021, CT 04/15/2022  TECHNIQUE: Transabdominal scans were performed. Endovaginal ultrasound was performed to better visualize the adnexa.  FINDINGS:  UTERUS: 9.3 x 5.1 x 6.6 cm. Normal size, contour and position. At the anterior aspect of the mid to upper segment there is an isoechoic solid nodular area measuring 2.8 x 2.2 x 2.9 cm. It abuts the adjacent endometrium. This is not definitely seen on comparison ultrasound or CT imaging.  ENDOMETRIUM: 7 mm. Normal smooth endometrium.  RIGHT OVARY: 3.7 x 2.1 x 2.6 cm. Normal. Incidental 2.6 cm dominant follicle.  LEFT OVARY: 2.5 x 1.2 x 1.6 cm. Normal.  No significant free fluid.                                                              IMPRESSION:  1.  Questionable partially submucosal 2.9 cm fibroid, not visualized on prior studies. No endometrial thickening and no evidence of an endometrial polyp.      EXAM: US PELVIC TRANSABDOMINAL AND TRANSVAGINAL  LOCATION: [facility]  DATE/TIME: 2022 6:02 PM  INDICATION:  Hx of ovarian cyst history of left ovarian  cystectomy, bilateral salpingectomy  COMPARISON: CT abdomen pelvis 04/15/2022.  TECHNIQUE: Transabdominal scans were performed. Endovaginal ultrasound was performed to better visualize the adnexa.  FINDINGS:  UTERUS: 9.3 x 6.5 x 4.8 cm. Normal in size and position with no masses.  ENDOMETRIUM: 13 mm. Normal smooth endometrium.  RIGHT OVARY: 2.9 x 2.3 x 2.0 cm. Normal. Contains an approximate 1.3 cm dominant follicle.  LEFT OVARY: 3.4 x 1.8 x 2.1 cm. Normal.  No significant free fluid.                                                               IMPRESSION:  1.  Normal pelvic ultrasound.        Component      Latest Ref Rng 2023  2:50 PM 2023  3:00 PM 2023  3:13 PM   N Gonorrhea PCR      Negative  Negative      Chlamydia Trachomatis PCR      Negative  Negative      TSH      0.30 - 4.20 uIU/mL   2.86    hCG Quantitative      <5 mIU/mL  <1     Prolactin      5 - 23 ng/mL   19            Past Medical History:   Diagnosis Date    Anxiety 2018    Complication of anesthesia     Hypothyroidism     Motion sickness     Paresthesia     PONV (postoperative nausea and vomiting)     Rectal bleeding - resolved, likely secondary to fissure 2019       Past Surgical History:   Procedure Laterality Date    DAVINCI SALPINGECTOMY Left 2020    Procedure: Robotic assisted laparoscopic left ovarian cystectomy, bilateral salpingectomy;  Surgeon: Rosalba Pierre MD;  Location:  OR    WRIST SURGERY  2015    Wrist surgery for torn ligaments       OB History    Para Term  AB Living   3 2 2 0 1 2   SAB IAB Ectopic Multiple Live Births   0 0 0 0 2      # Outcome Date GA Lbr Sergey/2nd Weight Sex Delivery Anes PTL Lv   3 Term 10/10/19 39w0d 01:15 / 00:10 3.35 kg (7 lb 6.2 oz) M Vag-Spont EPI N MARK      Name: GRACIELA,MALE-OLEGARIO      Apgar1: 8  Apgar5: 9   2 AB 10/2018 8w0d    SAB      1 Term 16 40w2d 10:43 / 01:38 3.74 kg (8 lb 3.9 oz) F Vag-Spont Local, EPI N MARK      Name: Gaviota       Apgar1: 8  Apgar5: 9       Gynecological History            Patient's last menstrual period was 07/01/2023.    No STD/No PID/No IUD     See above HPI        No Known Allergies    Current Outpatient Medications   Medication Sig Dispense Refill    clobetasol (TEMOVATE) 0.05 % external cream       levothyroxine (SYNTHROID/LEVOTHROID) 50 MCG tablet Take 1 tablet (50 mcg) by mouth daily 90 tablet 0       Social History     Socioeconomic History    Marital status:      Spouse name: Not on file    Number of children: 2    Years of education: Not on file    Highest education level: Not on file   Occupational History    Occupation: teacher   Tobacco Use    Smoking status: Never    Smokeless tobacco: Never   Vaping Use    Vaping Use: Never used   Substance and Sexual Activity    Alcohol use: No     Alcohol/week: 0.0 standard drinks of alcohol     Comment: infrequent    Drug use: No    Sexual activity: Yes     Partners: Male   Other Topics Concern    Parent/sibling w/ CABG, MI or angioplasty before 65F 55M? 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 [employer]     Social Determinants of Health     Financial Resource Strain: Not on file   Food Insecurity: Not on file   Transportation Needs: Not on file   Physical Activity: Not on file   Stress: Not on file   Social Connections: Not on file   Intimate Partner Violence: Not on file   Housing Stability: Not on file       Family History   Problem Relation Age of Onset    Hypothyroidism Mother     Thyroid Disease Mother     Hypertension Father     Heart Disease Father         heart attack    Diabetes Father     No Known Problems Sister     No Known Problems Daughter     No Known Problems Son     Ovarian Cancer Paternal Aunt     Ovarian Cancer Paternal Grandmother          Review of Systems:  10 point ROS of systems including Constitutional, Eyes, Respiratory, Cardiovascular,  Gastroenterology, Genitourinary, Integumentary, Muscularskeletal, Psychiatric were all negative except for pertinent positives noted in my HPI and in the PMH.          EXAM:  MA present for exam and procedure   /81 (BP Location: Right arm, Cuff Size: Adult Regular)   Pulse 75   Wt 76.7 kg (169 lb 3.2 oz)   LMP 07/01/2023   SpO2 100%   BMI 28.16 kg/m    Body mass index is 28.16 kg/m .  General Appearance:  healthy, alert, active, no distress  Skin:  Normal skin turgor  Neuro:  Alert, cranial nerves grossly intact  HEENT: NCAT  Neck:  No masses or lesions carotids are +2/4. No bruits heard  Lungs:  Good respiratory effort   Abdomen: Soft, nontender.  Normal bowel sounds.  No masses  Vulva: No external lesions, normal hair distribution, no adenopathy  BUS:  Normal, no masses noted  Urethral meatus:  No masses noted  Urethra:  No hypermobility  Vagina: Moist, pink, some blood in the vagina, well rugated, no lesions  Cervix: Smooth, pink, no visible lesions  Uterus: Normal size, anteverted, non-tender, mobile  Ovaries: No mass, non-tender, mobile  Extremities:  No clubbing, cyanosis or edema.      Lab performed today and results reviewed today with Laurie.  Component      Latest Ref Rng 7/19/2023  8:42 AM   Hemoglobin      11.7 - 15.7 g/dL 13.8          ASSESSMENT:  Intermenstrual bleeding   Right ovarian cyst.         PLAN:  Hgb is ordered.   We discussed the bleeding profiles as people age and approach menopause.  Even though menstrual changes and irregularities are common and expected, they may also indicate or precipitate endometrial abnormalities.   The patient and I discussed the options for evaluation.  The EMB, SIS and the D&C were reviewed with her.  The EMB will not remove a polyp, but will give a tissue sample.  The SIS will further evaluate the lining, but no tissue sample, and should she have a suspected polyp, it would not be removed.  The D&C is more expensive but is currently the gold standard.     Together we reviewed the risks and benefits of medical versus surgical therapy.  Medical therapy reviewed included hormonal manipulation with OCP's, Patch, Ring, Depo, or IUD.   We reviewed the aspects of fibroid embolization.  At this time the   We reviewed endometrial ablation versus hysterectomy.  Discussed that endometrial ablation is minimally invasive compared to hysterectomy but may not be definitive.  Patient is considering the OCPs.  The prescription is sent to the pharmacy for Lo/Ovral.     The right ovarian cyst is benign, and with the size, no further follow up is needed.  We reviewed the ovarian cyst management noted below.    Management Recommendation:  Simple Cyst:  Reproductive Age Patient     < 5 cm: No follow up.     5-7 cm: Yearly US.     > 7 cm: MRI.    Postmenopausal Patient  < 1 cm: No follow up.  1-7 cm: Yearly US.  > 7 cm: MRI.    Hemorrhagic Cyst:  Reproductive Age Patient     < 5 cm: No follow up.     > 5 cm: Follow up US in 6-12 weeks.     Endometrioma: Any Age, Any Size: Follow up US in 6-12 weeks. If stable and no surgical intervention, then yearly US.    Complex or Indeterminate Cysts  Premenopausal:  Less than or equal to 3 cm: Consider physiologic.  Greater than 3 cm: 8-12 week follow up.  Cyst with single thin wall septation or calcification in the wall:  Postmenopausal patient      1-7 cm:  yearly ultrasound     Dermoid: Any Age: If not surgically removed, yearly US to ensure stability.       Reference: Asymptomatic Ovarian and Other Adnexal Cysts Imaged at US. Radiology: Volume 256: Number 3-September 2010      Premenopausal asymptomatic simple cyst:  *  <= 3 cm: Normal, no follow-up.  *  >3 to <= 5 cm: Benign finding in the physiologic size range. No follow-up is needed.  *  >5 to <= 7 cm: Benign simple cyst. Clinically inconsequential finding. Follow-up pelvic ultrasound in 6 months is recommended.  *  >7 cm: Benign simple cyst. Follow-up pelvic ultrasound in 6 months is  recommended.    Postmenopausal asymptomatic simple cyst:  </= 1 cm: Normal, no follow-up.  >1cm to </= 3 cm: Clinically inconsequential finding. No follow-up  needed.  >3 to </=5 cm: Benign simple cyst. Clinically inconsequential finding.  Follow-up pelvic ultrasound in 6 months recommended.  >5 cm: Benign simple cyst. Follow-up pelvic ultrasound in 6 months  recommended.     Postmenopausal complex cyst    Any size:  surgical consultation.      REFERENCE:  Management of Asymptomatic Ovarian and Other Adnexal Cysts Imaged at  US: Society of Radiologists in Ultrasound Consensus Conference  Statement. Radiology September 2010; [id number].     Simple Adnexal Cysts: SRU Consensus Conference Update on Follow-up and  Reporting. Radiology September 2019. [id number].    Julian Sargent MD          PROCEDURE NOTE:  The procedure was reviewed with patient.  After consenting to the procedure she was placed into the dorsal lithotomy position.  The examination was performed.  The speculum was placed into the vagina.  The cervix was prepped with Betadine.  The anterior lip of the cervix was grasped with the Allis tenaculum.  The uterus was sounded and the Pipelle was used to sample the endometrium.    Instruments were removed and the specimen was sent to pathology.  Results to the patient in 1-2 weeks when they are returned.    Julian Sargent MD

## 2023-07-20 ENCOUNTER — MYC MEDICAL ADVICE (OUTPATIENT)
Dept: OBGYN | Facility: CLINIC | Age: 43
End: 2023-07-20
Payer: COMMERCIAL

## 2023-07-20 LAB
PATH REPORT.COMMENTS IMP SPEC: NORMAL
PATH REPORT.COMMENTS IMP SPEC: NORMAL
PATH REPORT.FINAL DX SPEC: NORMAL
PATH REPORT.GROSS SPEC: NORMAL
PATH REPORT.MICROSCOPIC SPEC OTHER STN: NORMAL
PATH REPORT.RELEVANT HX SPEC: NORMAL
PHOTO IMAGE: NORMAL

## 2023-07-21 LAB
BKR LAB AP GYN ADEQUACY: NORMAL
BKR LAB AP GYN INTERPRETATION: NORMAL
BKR LAB AP HPV REFLEX: NORMAL
BKR LAB AP PREVIOUS ABNORMAL: NORMAL
PATH REPORT.COMMENTS IMP SPEC: NORMAL
PATH REPORT.COMMENTS IMP SPEC: NORMAL
PATH REPORT.RELEVANT HX SPEC: NORMAL

## 2023-07-23 ENCOUNTER — MYC REFILL (OUTPATIENT)
Dept: FAMILY MEDICINE | Facility: CLINIC | Age: 43
End: 2023-07-23
Payer: COMMERCIAL

## 2023-07-23 DIAGNOSIS — E03.9 ACQUIRED HYPOTHYROIDISM: ICD-10-CM

## 2023-07-24 RX ORDER — LEVOTHYROXINE SODIUM 50 UG/1
50 TABLET ORAL DAILY
Qty: 90 TABLET | Refills: 0 | Status: SHIPPED | OUTPATIENT
Start: 2023-07-24 | End: 2023-10-16

## 2023-07-25 ENCOUNTER — MYC MEDICAL ADVICE (OUTPATIENT)
Dept: OBGYN | Facility: CLINIC | Age: 43
End: 2023-07-25
Payer: COMMERCIAL

## 2023-07-25 NOTE — TELEPHONE ENCOUNTER
Pt last seen 7/19/2023 for spotting and EMB.    Pt asking for EMB results- RN routing to provider for review and interpretation.    Amanda Holcomb RN on 7/25/2023 at 9:27 AM

## 2023-08-11 ENCOUNTER — OFFICE VISIT (OUTPATIENT)
Dept: FAMILY MEDICINE | Facility: CLINIC | Age: 43
End: 2023-08-11
Payer: COMMERCIAL

## 2023-08-11 VITALS
DIASTOLIC BLOOD PRESSURE: 82 MMHG | SYSTOLIC BLOOD PRESSURE: 120 MMHG | OXYGEN SATURATION: 99 % | BODY MASS INDEX: 28.49 KG/M2 | RESPIRATION RATE: 16 BRPM | HEIGHT: 65 IN | HEART RATE: 84 BPM | WEIGHT: 171 LBS | TEMPERATURE: 99 F

## 2023-08-11 DIAGNOSIS — Z13.220 SCREENING FOR HYPERLIPIDEMIA: ICD-10-CM

## 2023-08-11 DIAGNOSIS — Z13.1 SCREENING FOR DIABETES MELLITUS: ICD-10-CM

## 2023-08-11 DIAGNOSIS — R09.A2 GLOBUS SENSATION: ICD-10-CM

## 2023-08-11 DIAGNOSIS — Z00.00 ROUTINE GENERAL MEDICAL EXAMINATION AT A HEALTH CARE FACILITY: Primary | ICD-10-CM

## 2023-08-11 PROCEDURE — 99396 PREV VISIT EST AGE 40-64: CPT | Performed by: FAMILY MEDICINE

## 2023-08-11 RX ORDER — FLUOXETINE 10 MG/1
CAPSULE ORAL
Qty: 53 CAPSULE | Refills: 0 | Status: SHIPPED | OUTPATIENT
Start: 2023-08-11 | End: 2023-09-06

## 2023-08-11 ASSESSMENT — ENCOUNTER SYMPTOMS
HEADACHES: 0
EYE PAIN: 0
NAUSEA: 0
HEMATOCHEZIA: 0
PARESTHESIAS: 0
HEARTBURN: 1
SORE THROAT: 0
JOINT SWELLING: 0
COUGH: 0
CHILLS: 0
DYSURIA: 0
DIARRHEA: 0
PALPITATIONS: 0
BREAST MASS: 0
HEMATURIA: 0
WEAKNESS: 0
ABDOMINAL PAIN: 0
NERVOUS/ANXIOUS: 1
MYALGIAS: 1
SHORTNESS OF BREATH: 0
FEVER: 0
DIZZINESS: 0
ARTHRALGIAS: 0
CONSTIPATION: 0
FREQUENCY: 1

## 2023-08-11 ASSESSMENT — PATIENT HEALTH QUESTIONNAIRE - PHQ9
SUM OF ALL RESPONSES TO PHQ QUESTIONS 1-9: 2
10. IF YOU CHECKED OFF ANY PROBLEMS, HOW DIFFICULT HAVE THESE PROBLEMS MADE IT FOR YOU TO DO YOUR WORK, TAKE CARE OF THINGS AT HOME, OR GET ALONG WITH OTHER PEOPLE: NOT DIFFICULT AT ALL
SUM OF ALL RESPONSES TO PHQ QUESTIONS 1-9: 2

## 2023-08-11 ASSESSMENT — ANXIETY QUESTIONNAIRES
GAD7 TOTAL SCORE: 5
5. BEING SO RESTLESS THAT IT IS HARD TO SIT STILL: NOT AT ALL
6. BECOMING EASILY ANNOYED OR IRRITABLE: NOT AT ALL
2. NOT BEING ABLE TO STOP OR CONTROL WORRYING: SEVERAL DAYS
3. WORRYING TOO MUCH ABOUT DIFFERENT THINGS: SEVERAL DAYS
IF YOU CHECKED OFF ANY PROBLEMS ON THIS QUESTIONNAIRE, HOW DIFFICULT HAVE THESE PROBLEMS MADE IT FOR YOU TO DO YOUR WORK, TAKE CARE OF THINGS AT HOME, OR GET ALONG WITH OTHER PEOPLE: NOT DIFFICULT AT ALL
1. FEELING NERVOUS, ANXIOUS, OR ON EDGE: SEVERAL DAYS
GAD7 TOTAL SCORE: 5
4. TROUBLE RELAXING: SEVERAL DAYS
7. FEELING AFRAID AS IF SOMETHING AWFUL MIGHT HAPPEN: SEVERAL DAYS

## 2023-08-11 NOTE — PROGRESS NOTES
SUBJECTIVE:   CC: Laurie is an 42 year old who presents for preventive health visit.         8/11/2023     8:35 AM   Additional Questions   Roomed by Wilda DACOSTA CMA       Healthy Habits:     Getting at least 3 servings of Calcium per day:  Yes    Bi-annual eye exam:  Yes    Dental care twice a year:  Yes    Sleep apnea or symptoms of sleep apnea:  None    Diet:  Regular (no restrictions)    Frequency of exercise:  2-3 days/week    Duration of exercise:  Less than 15 minutes    Taking medications regularly:  Yes    Medication side effects:  None    Additional concerns today:  No    Today's PHQ-9 Score:       8/11/2023     8:32 AM   PHQ-9 SCORE   PHQ-9 Total Score MyChart 2 (Minimal depression)   PHQ-9 Total Score 2       Hypothyroidism Follow-up    Since last visit, patient describes the following symptoms: Weight stable, no hair loss, no skin changes, no constipation, no loose stools    Have you ever done Advance Care Planning? (For example, a Health Directive, POLST, or a discussion with a medical provider or your loved ones about your wishes): No, advance care planning information given to patient to review.  Patient declined advance care planning discussion at this time.    Social History     Tobacco Use    Smoking status: Never    Smokeless tobacco: Never   Substance Use Topics    Alcohol use: No     Alcohol/week: 0.0 standard drinks of alcohol     Comment: infrequent           8/11/2023     8:34 AM   Alcohol Use   Prescreen: >3 drinks/day or >7 drinks/week? No     Reviewed orders with patient.  Reviewed health maintenance and updated orders accordingly - Yes  Labs reviewed in EPIC    Breast Cancer Screening:    FHS-7:       8/11/2022     9:50 AM 12/28/2022     4:25 PM 8/11/2023     8:35 AM   Breast CA Risk Assessment (FHS-7)   Did any of your first-degree relatives have breast or ovarian cancer? No No Yes   Did any of your relatives have bilateral breast cancer? Unknown No No   Did any man in your family have  breast cancer? No No No   Did any woman in your family have breast and ovarian cancer? Unknown No No   Did any woman in your family have breast cancer before age 50 y? No No    Do you have 2 or more relatives with breast and/or ovarian cancer? Yes Yes    Do you have 2 or more relatives with breast and/or bowel cancer? No No        Mammogram Screening - Offered annual screening and updated Health Maintenance for [place] plan based on risk factor consideration  Pertinent mammograms are reviewed under the imaging tab.    History of abnormal Pap smear: NO - age 30-65 PAP every 5 years with negative HPV co-testing recommended      Latest Ref Rng & Units 7/17/2023     2:50 PM 8/11/2022    10:37 AM 11/20/2018     5:11 PM   PAP / HPV   PAP  Negative for Intraepithelial Lesion or Malignancy (NILM)  Negative for Intraepithelial Lesion or Malignancy (NILM)     HPV 16 DNA Negative Negative  Negative  Negative    HPV 18 DNA Negative Negative  Negative  Negative    Other HR HPV Negative Negative  Negative  Negative      Reviewed and updated as needed this visit by clinical staff    Allergies  Meds            Reviewed and updated as needed this visit by Provider                     Review of Systems   Constitutional:  Negative for chills and fever.   HENT:  Negative for congestion, ear pain, hearing loss and sore throat.    Eyes:  Negative for pain and visual disturbance.   Respiratory:  Negative for cough and shortness of breath.    Cardiovascular:  Negative for chest pain, palpitations and peripheral edema.   Gastrointestinal:  Positive for heartburn. Negative for abdominal pain, constipation, diarrhea, hematochezia and nausea.   Breasts:  Negative for tenderness, breast mass and discharge.   Genitourinary:  Positive for frequency and vaginal bleeding. Negative for dysuria, genital sores, hematuria, pelvic pain, urgency and vaginal discharge.   Musculoskeletal:  Positive for myalgias. Negative for arthralgias and joint  "swelling.   Skin:  Negative for rash.   Neurological:  Negative for dizziness, weakness, headaches and paresthesias.   Psychiatric/Behavioral:  Negative for mood changes. The patient is nervous/anxious.       OBJECTIVE:   /82   Pulse 84   Temp 99  F (37.2  C) (Tympanic)   Resp 16   Ht 1.651 m (5' 5\")   Wt 77.6 kg (171 lb)   LMP  (LMP Unknown)   SpO2 99%   BMI 28.46 kg/m    Physical Exam    GENERAL: healthy, alert and no distress  EYES: Eyes grossly normal to inspection  HENT: ear canals and TM's normal, nose and mouth without ulcers or lesions  NECK: no adenopathy and no asymmetry, masses, or scars  RESP: lungs clear to auscultation - no rales, rhonchi or wheezes  CV: regular rates and rhythm, normal S1 S2, no S3 or S4, no murmur, click or rub, peripheral pulses strong, and no peripheral edema  MS: no gross musculoskeletal defects noted, no edema  PSYCH: mentation appears normal, affect normal/bright    Diagnostic Test Results:  Labs reviewed in Epic    ASSESSMENT/PLAN:   1. Routine general medical examination at a health care facility  Health maintenance reviewed and updated. Emphasized importance of balanced diet and regular exercise. She does have some numbness of her 2nd and 3rd toes on the left foot. No injury or trauma. Currently doing physical therapy for right sided sciatica. Continue to monitor. If symptoms not improving, consider EMG on left leg      2. Globus sensation  Previously saw GI, had normal endoscopy. GI doctor had talked about treating anxiety to help. She does feel like a more anxious person. Will try starting fluoxetine. Follow up in 1 month.  - FLUoxetine (PROZAC) 10 MG capsule; Take 1 capsule (10 mg) by mouth daily for 7 days, THEN 2 capsules (20 mg) daily for 23 days.  Dispense: 53 capsule; Refill: 0    3. Screening for diabetes mellitus  - Comprehensive metabolic panel (BMP + Alb, Alk Phos, ALT, AST, Total. Bili, TP); Future    4. Screening for hyperlipidemia  - Lipid panel " "reflex to direct LDL Fasting; Future      Patient has been advised of split billing requirements and indicates understanding: Yes      COUNSELING:  Reviewed preventive health counseling, as reflected in patient instructions      BMI:   Estimated body mass index is 28.46 kg/m  as calculated from the following:    Height as of this encounter: 1.651 m (5' 5\").    Weight as of this encounter: 77.6 kg (171 lb).   Weight management plan: Discussed healthy diet and exercise guidelines      She reports that she has never smoked. She has never used smokeless tobacco.          Luciano Pedraza [facility] PRIOR PlanoAnswers submitted by the patient for this visit:  Patient Health Questionnaire (Submitted on 8/11/2023)  If you checked off any problems, how difficult have these problems made it for you to do your work, take care of things at home, or get along with other people?: Not difficult at all  PHQ9 TOTAL SCORE: 2  JAKE-7 (Submitted on 8/11/2023)  JAKE 7 TOTAL SCORE: 5    "

## 2023-08-16 ENCOUNTER — LAB (OUTPATIENT)
Dept: LAB | Facility: CLINIC | Age: 43
End: 2023-08-16
Payer: COMMERCIAL

## 2023-08-16 DIAGNOSIS — Z13.1 SCREENING FOR DIABETES MELLITUS: ICD-10-CM

## 2023-08-16 DIAGNOSIS — Z13.220 SCREENING FOR HYPERLIPIDEMIA: ICD-10-CM

## 2023-08-16 LAB
ALBUMIN SERPL BCG-MCNC: 4.4 G/DL (ref 3.5–5.2)
ALP SERPL-CCNC: 64 U/L (ref 35–104)
ALT SERPL W P-5'-P-CCNC: 14 U/L (ref 0–50)
ANION GAP SERPL CALCULATED.3IONS-SCNC: 11 MMOL/L (ref 7–15)
AST SERPL W P-5'-P-CCNC: 19 U/L (ref 0–45)
BILIRUB SERPL-MCNC: 0.9 MG/DL
BUN SERPL-MCNC: 7 MG/DL (ref 6–20)
CALCIUM SERPL-MCNC: 9 MG/DL (ref 8.6–10)
CHLORIDE SERPL-SCNC: 107 MMOL/L (ref 98–107)
CHOLEST SERPL-MCNC: 132 MG/DL
CREAT SERPL-MCNC: 0.54 MG/DL (ref 0.51–0.95)
DEPRECATED HCO3 PLAS-SCNC: 23 MMOL/L (ref 22–29)
GFR SERPL CREATININE-BSD FRML MDRD: >90 ML/MIN/1.73M2
GLUCOSE SERPL-MCNC: 89 MG/DL (ref 70–99)
HDLC SERPL-MCNC: 66 MG/DL
LDLC SERPL CALC-MCNC: 56 MG/DL
NONHDLC SERPL-MCNC: 66 MG/DL
POTASSIUM SERPL-SCNC: 3.8 MMOL/L (ref 3.4–5.3)
PROT SERPL-MCNC: 6.9 G/DL (ref 6.4–8.3)
SODIUM SERPL-SCNC: 141 MMOL/L (ref 136–145)
TRIGL SERPL-MCNC: 50 MG/DL

## 2023-08-16 PROCEDURE — 80053 COMPREHEN METABOLIC PANEL: CPT

## 2023-08-16 PROCEDURE — 36415 COLL VENOUS BLD VENIPUNCTURE: CPT

## 2023-08-16 PROCEDURE — 80061 LIPID PANEL: CPT

## 2023-08-20 PROBLEM — M54.41 CHRONIC BILATERAL LOW BACK PAIN WITH RIGHT-SIDED SCIATICA: Status: RESOLVED | Noted: 2023-07-07 | Resolved: 2023-08-20

## 2023-08-20 PROBLEM — G89.29 CHRONIC BILATERAL LOW BACK PAIN WITH RIGHT-SIDED SCIATICA: Status: RESOLVED | Noted: 2023-07-07 | Resolved: 2023-08-20

## 2023-09-02 DIAGNOSIS — R09.A2 GLOBUS SENSATION: ICD-10-CM

## 2023-09-06 NOTE — TELEPHONE ENCOUNTER
1st attempt: LVM for patient to call back and schedule    Thalia Garcia, [facility]

## 2023-09-06 NOTE — TELEPHONE ENCOUNTER
Refill signed. Please have patient schedule follow up on medication to see how she is doing.    Luciano Pedraza,   9/6/2023 9:20 AM

## 2023-09-13 ENCOUNTER — MYC MEDICAL ADVICE (OUTPATIENT)
Dept: FAMILY MEDICINE | Facility: CLINIC | Age: 43
End: 2023-09-13
Payer: COMMERCIAL

## 2023-10-15 DIAGNOSIS — E03.9 ACQUIRED HYPOTHYROIDISM: ICD-10-CM

## 2023-10-16 RX ORDER — LEVOTHYROXINE SODIUM 50 UG/1
50 TABLET ORAL DAILY
Qty: 90 TABLET | Refills: 1 | Status: SHIPPED | OUTPATIENT
Start: 2023-10-16 | End: 2024-04-15

## 2023-11-06 PROCEDURE — 88112 CYTOPATH CELL ENHANCE TECH: CPT | Mod: TC,ORL

## 2023-11-06 PROCEDURE — 88112 CYTOPATH CELL ENHANCE TECH: CPT | Mod: 26 | Performed by: PATHOLOGY

## 2023-11-07 ENCOUNTER — LAB REQUISITION (OUTPATIENT)
Dept: LAB | Facility: CLINIC | Age: 43
End: 2023-11-07
Payer: COMMERCIAL

## 2023-11-07 DIAGNOSIS — R87.629 UNSPECIFIED ABNORMAL CYTOLOGICAL FINDINGS IN SPECIMENS FROM VAGINA: ICD-10-CM

## 2023-11-09 ENCOUNTER — OFFICE VISIT (OUTPATIENT)
Dept: FAMILY MEDICINE | Facility: CLINIC | Age: 43
End: 2023-11-09
Payer: COMMERCIAL

## 2023-11-09 ENCOUNTER — NURSE TRIAGE (OUTPATIENT)
Dept: NURSING | Facility: CLINIC | Age: 43
End: 2023-11-09

## 2023-11-09 ENCOUNTER — ANCILLARY PROCEDURE (OUTPATIENT)
Dept: GENERAL RADIOLOGY | Facility: CLINIC | Age: 43
End: 2023-11-09
Attending: FAMILY MEDICINE
Payer: COMMERCIAL

## 2023-11-09 ENCOUNTER — NURSE TRIAGE (OUTPATIENT)
Dept: FAMILY MEDICINE | Facility: CLINIC | Age: 43
End: 2023-11-09

## 2023-11-09 VITALS
WEIGHT: 174 LBS | OXYGEN SATURATION: 100 % | TEMPERATURE: 99.2 F | HEIGHT: 65 IN | DIASTOLIC BLOOD PRESSURE: 82 MMHG | SYSTOLIC BLOOD PRESSURE: 132 MMHG | HEART RATE: 80 BPM | RESPIRATION RATE: 14 BRPM | BODY MASS INDEX: 28.99 KG/M2

## 2023-11-09 DIAGNOSIS — R07.9 CHEST PAIN, UNSPECIFIED TYPE: ICD-10-CM

## 2023-11-09 DIAGNOSIS — R07.9 CHEST PAIN, UNSPECIFIED TYPE: Primary | ICD-10-CM

## 2023-11-09 LAB
BASOPHILS # BLD AUTO: 0.1 10E3/UL (ref 0–0.2)
BASOPHILS NFR BLD AUTO: 1 %
D DIMER PPP FEU-MCNC: 0.34 UG/ML FEU (ref 0–0.5)
EOSINOPHIL # BLD AUTO: 0.2 10E3/UL (ref 0–0.7)
EOSINOPHIL NFR BLD AUTO: 2 %
ERYTHROCYTE [DISTWIDTH] IN BLOOD BY AUTOMATED COUNT: 12.2 % (ref 10–15)
ERYTHROCYTE [SEDIMENTATION RATE] IN BLOOD BY WESTERGREN METHOD: 7 MM/HR (ref 0–20)
HCT VFR BLD AUTO: 42.1 % (ref 35–47)
HGB BLD-MCNC: 14.2 G/DL (ref 11.7–15.7)
IMM GRANULOCYTES # BLD: 0 10E3/UL
IMM GRANULOCYTES NFR BLD: 0 %
LYMPHOCYTES # BLD AUTO: 2.9 10E3/UL (ref 0.8–5.3)
LYMPHOCYTES NFR BLD AUTO: 33 %
MCH RBC QN AUTO: 31.2 PG (ref 26.5–33)
MCHC RBC AUTO-ENTMCNC: 33.7 G/DL (ref 31.5–36.5)
MCV RBC AUTO: 93 FL (ref 78–100)
MONOCYTES # BLD AUTO: 0.7 10E3/UL (ref 0–1.3)
MONOCYTES NFR BLD AUTO: 8 %
NEUTROPHILS # BLD AUTO: 4.9 10E3/UL (ref 1.6–8.3)
NEUTROPHILS NFR BLD AUTO: 56 %
PATH REPORT.COMMENTS IMP SPEC: NORMAL
PATH REPORT.FINAL DX SPEC: NORMAL
PATH REPORT.GROSS SPEC: NORMAL
PATH REPORT.RELEVANT HX SPEC: NORMAL
PLATELET # BLD AUTO: 347 10E3/UL (ref 150–450)
RBC # BLD AUTO: 4.55 10E6/UL (ref 3.8–5.2)
WBC # BLD AUTO: 8.8 10E3/UL (ref 4–11)

## 2023-11-09 PROCEDURE — 80053 COMPREHEN METABOLIC PANEL: CPT | Performed by: FAMILY MEDICINE

## 2023-11-09 PROCEDURE — 93000 ELECTROCARDIOGRAM COMPLETE: CPT | Performed by: FAMILY MEDICINE

## 2023-11-09 PROCEDURE — 71046 X-RAY EXAM CHEST 2 VIEWS: CPT | Mod: TC | Performed by: RADIOLOGY

## 2023-11-09 PROCEDURE — 85379 FIBRIN DEGRADATION QUANT: CPT | Performed by: FAMILY MEDICINE

## 2023-11-09 PROCEDURE — 85025 COMPLETE CBC W/AUTO DIFF WBC: CPT | Performed by: FAMILY MEDICINE

## 2023-11-09 PROCEDURE — 99213 OFFICE O/P EST LOW 20 MIN: CPT | Mod: 25 | Performed by: FAMILY MEDICINE

## 2023-11-09 PROCEDURE — 86140 C-REACTIVE PROTEIN: CPT | Performed by: FAMILY MEDICINE

## 2023-11-09 PROCEDURE — 85652 RBC SED RATE AUTOMATED: CPT | Performed by: FAMILY MEDICINE

## 2023-11-09 PROCEDURE — 36415 COLL VENOUS BLD VENIPUNCTURE: CPT | Performed by: FAMILY MEDICINE

## 2023-11-09 ASSESSMENT — PAIN SCALES - GENERAL: PAINLEVEL: MODERATE PAIN (4)

## 2023-11-09 ASSESSMENT — ENCOUNTER SYMPTOMS: SHORTNESS OF BREATH: 1

## 2023-11-09 NOTE — TELEPHONE ENCOUNTER
"Nurse Triage SBAR    Is this a 2nd Level Triage? YES, LICENSED PRACTITIONER REVIEW IS REQUIRED    Situation: Triage for \"chest discomfort\" and SOB    Background: Pt made MyChart appointment provider asked to be triaged.     Assessment: Moderate discomfort middle of chest. Stops her from taking a deep breath and is constant since beginning of September.     Protocol Recommended Disposition:   Go To ED/UCC Now (Or To Office With PCP Approval)    Recommendation: Pt is at clinic please advise recommendations.      Routed to provider    Does the patient meet one of the following criteria for ADS visit consideration? 16+ years old, with an [facility] PCP     TIP  Providers, please consider if this condition is appropriate for management at one of our Acute and Diagnostic Services sites.     If patient is a good candidate, please use dotphrase <dot>triageresponse and select Refer to ADS to document.           Reason for Disposition   Chest pain lasting longer than 5 minutes and occurred in last 3 days (72 hours) (Exception: Feels exactly the same as previously diagnosed heartburn and has accompanying sour taste in mouth.)    Additional Information   Negative: Followed an injury to chest   Negative: SEVERE difficulty breathing (e.g., struggling for each breath, speaks in single words)   Negative: Difficult to awaken or acting confused (e.g., disoriented, slurred speech)   Negative: Shock suspected (e.g., cold/pale/clammy skin, too weak to stand, low BP, rapid pulse)   Negative: Passed out (i.e., lost consciousness, collapsed and was not responding)   Negative: Chest pain lasting longer than 5 minutes and ANY of the following:         Pain is crushing, pressure-like, or heavy         Over 44 years old          Over 30 years old and one cardiac risk factor (e.g diabetes, high blood pressure, high cholesterol, smoker, or family history of heart disease)         History of heart disease (e.g. angina, heart attack, heart failure, " "bypass surgery, takes nitroglycerin)   Negative: Heart beating < 50 beats per minute OR > 140 beats per minute   Negative: Visible sweat on face or sweat dripping down face   Negative: Sounds like a life-threatening emergency to the triager   Negative: SEVERE chest pain   Negative: Pain also in shoulder(s) or arm(s) or jaw   Negative: Difficulty breathing   Negative: Cocaine use within last 3 days   Negative: Major surgery in the past month   Negative: Hip or leg fracture (broken bone) in past month (or had cast on leg or ankle in past month)   Negative: Illness requiring prolonged bedrest in past month (e.g., immobilization, long hospital stay)   Negative: Long-distance travel in past month (e.g., car, bus, train, plane; with trip lasting 6 or more hours)   Negative: History of prior 'blood clot' in leg or lungs (i.e., deep vein thrombosis, pulmonary embolism)   Negative: History of inherited increased risk of blood clots (e.g., Factor 5 Leiden, Anti-thrombin 3, Protein C or Protein S deficiency, Prothrombin mutation)   Negative: Cancer treatment in the past two months (or has cancer now)   Negative: Heart beating irregularly or very rapidly    Answer Assessment - Initial Assessment Questions  1. LOCATION: \"Where does it hurt?\"        Center of chest   2. RADIATION: \"Does the pain go anywhere else?\" (e.g., into neck, jaw, arms, back)      no  3. ONSET: \"When did the chest pain begin?\" (Minutes, hours or days)       September 1   4. PATTERN: \"Does the pain come and go, or has it been constant since it started?\"  \"Does it get worse with exertion?\"       Constant   5. DURATION: \"How long does it last\" (e.g., seconds, minutes, hours)      Always there   6. SEVERITY: \"How bad is the pain?\"  (e.g., Scale 1-10; mild, moderate, or severe)     - MILD (1-3): doesn't interfere with normal activities      - MODERATE (4-7): interferes with normal activities or awakens from sleep     - SEVERE (8-10): excruciating pain, unable to " "do any normal activities        Moderate  7. CARDIAC RISK FACTORS: \"Do you have any history of heart problems or risk factors for heart disease?\" (e.g., angina, prior heart attack; diabetes, high blood pressure, high cholesterol, smoker, or strong family history of heart disease)      No  8. PULMONARY RISK FACTORS: \"Do you have any history of lung disease?\"  (e.g., blood clots in lung, asthma, emphysema, birth control pills)      No, just started BC in August 9. CAUSE: \"What do you think is causing the chest pain?\"      Unsure  10. OTHER SYMPTOMS: \"Do you have any other symptoms?\" (e.g., dizziness, nausea, vomiting, sweating, fever, difficulty breathing, cough)        Difficulty breathing, can't take a deep breath and if she tries it hursts  11. PREGNANCY: \"Is there any chance you are pregnant?\" \"When was your last menstrual period?\"        N/A    Protocols used: Chest Pain-A-[place]    "

## 2023-11-09 NOTE — PROGRESS NOTES
Assessment & Plan     Chest pain, unspecified type  Unclear etiology. She does have some risk factors including recent travel, OCP use. Will check D dimer to rule out PE despite no other symptoms (LE edema) CXR negative. ?musculoskeletal, anxiety. Follow up to be determined on lab results.  - EKG 12-lead complete w/read - Clinics  - XR Chest 2 Views; Future  - Comprehensive metabolic panel (BMP + Alb, Alk Phos, ALT, AST, Total. Bili, TP); Future  - CBC with platelets and differential; Future  - D dimer, quantitative; Future  - CRP, inflammation; Future  - ESR: Erythrocyte sedimentation rate; Future  - Comprehensive metabolic panel (BMP + Alb, Alk Phos, ALT, AST, Total. Bili, TP)  - CBC with platelets and differential  - D dimer, quantitative  - CRP, inflammation  - ESR: Erythrocyte sedimentation rate      Luciano Pedraza, [facility]    Alyse Sullivan is a 42 year old, presenting for the following health issues:    Shortness of Breath        11/9/2023     1:41 PM   Additional Questions   Roomed by APSCUAL REYNOLDS   Accompanied by SELF       History of Present Illness       Reason for visit:  Chest tightness  Symptom onset:  More than a month  Symptoms include:  Chest tightness  Symptom intensity:  Moderate  Symptom progression:  Staying the same  Had these symptoms before:  No  What makes it worse:  No  What makes it better:  No    She eats 2-3 servings of fruits and vegetables daily.She consumes 1 sweetened beverage(s) daily.She exercises with enough effort to increase her heart rate 10 to 19 minutes per day.  She exercises with enough effort to increase her heart rate 3 or less days per week.   She is taking medications regularly.     Symptoms started in September -- pain in the middle of her chest. Started after getting back from a cruise and around the same time she started Prozac which she has since stopped taking. Also started birth control in August. No shortness of breath or  "wheezing or cough. Feels like her breathing is restricted though. No fevers or chills. No change in symptoms with activity. Doesn't interfere with sleep or wake her up. Describes pain as tightness and worse when taking a deep breath. Took Nexium for 2 weeks without any change. No LE edema    No injuries or trauma. No increased exercise/weight lifting.         Review of Systems   Respiratory:  Positive for shortness of breath.       Constitutional, HEENT, cardiovascular, pulmonary, gi and gu systems are negative, except as otherwise noted.      Objective    /82   Pulse 80   Temp 99.2  F (37.3  C) (Tympanic)   Resp 14   Ht 1.651 m (5' 5\")   Wt 78.9 kg (174 lb)   LMP 11/08/2023   SpO2 100%   BMI 28.96 kg/m    Body mass index is 28.96 kg/m .  Physical Exam   GENERAL: healthy, alert and no distress  RESP: lungs clear to auscultation - no rales, rhonchi or wheezes  CV: regular rates and rhythm, normal S1 S2, no S3 or S4, no murmur, click or rub, no peripheral edema  PSYCH: mentation appears normal, affect normal/bright                  "

## 2023-11-09 NOTE — TELEPHONE ENCOUNTER
Huddled with provider. Advised to come to appointment at clinic. Pt notified.    Suraj Chaidez RN [facility]

## 2023-11-10 ENCOUNTER — MYC MEDICAL ADVICE (OUTPATIENT)
Dept: FAMILY MEDICINE | Facility: CLINIC | Age: 43
End: 2023-11-10
Payer: COMMERCIAL

## 2023-11-10 LAB
ALBUMIN SERPL BCG-MCNC: 4.7 G/DL (ref 3.5–5.2)
ALP SERPL-CCNC: 55 U/L (ref 35–104)
ALT SERPL W P-5'-P-CCNC: 15 U/L (ref 0–50)
ANION GAP SERPL CALCULATED.3IONS-SCNC: 15 MMOL/L (ref 7–15)
AST SERPL W P-5'-P-CCNC: 16 U/L (ref 0–45)
BILIRUB SERPL-MCNC: 0.7 MG/DL
BUN SERPL-MCNC: 10.9 MG/DL (ref 6–20)
CALCIUM SERPL-MCNC: 10 MG/DL (ref 8.6–10)
CHLORIDE SERPL-SCNC: 105 MMOL/L (ref 98–107)
CREAT SERPL-MCNC: 0.52 MG/DL (ref 0.51–0.95)
CRP SERPL-MCNC: 8.74 MG/L
DEPRECATED HCO3 PLAS-SCNC: 23 MMOL/L (ref 22–29)
EGFRCR SERPLBLD CKD-EPI 2021: >90 ML/MIN/1.73M2
GLUCOSE SERPL-MCNC: 93 MG/DL (ref 70–99)
POTASSIUM SERPL-SCNC: 4.1 MMOL/L (ref 3.4–5.3)
PROT SERPL-MCNC: 7.7 G/DL (ref 6.4–8.3)
SODIUM SERPL-SCNC: 143 MMOL/L (ref 135–145)

## 2023-11-10 NOTE — TELEPHONE ENCOUNTER
"Laurie is calling regarding lab results from today.    She was of the understanding that she would receive a call back from her provider tonight regarding lab results.    Notified that some results are in, (ESR, D-dimer & CBC), and other are still pending, (CRP, CMP).    - If results were \"Critical\", then on-call provider & pt would be contacted.    Pt has not received any notice from [facility] that she has results available.  She will look to see if there are any available results.  She will wait to hear from provider tomorrow.    Vicky Rowe, LIN[employer]  [facility]      Reason for Disposition    Caller requesting lab results  (Exception: Routine or non-urgent lab result.)    Protocols used: PCP Call - No Triage-A-    "

## 2023-11-13 ENCOUNTER — TELEPHONE (OUTPATIENT)
Dept: FAMILY MEDICINE | Facility: CLINIC | Age: 43
End: 2023-11-13
Payer: COMMERCIAL

## 2023-11-13 DIAGNOSIS — R79.82 ELEVATED C-REACTIVE PROTEIN (CRP): Primary | ICD-10-CM

## 2023-11-13 DIAGNOSIS — R07.9 CHEST PAIN, UNSPECIFIED TYPE: ICD-10-CM

## 2023-11-13 NOTE — TELEPHONE ENCOUNTER
Called patient and advised patient of providers note.     Will schedule lab online    Wants imaging scheduling # sent to MyChart - done     Patient states understanding and is agreeable to plan.

## 2023-11-13 NOTE — TELEPHONE ENCOUNTER
With no improvement in symptoms, let's try to repeat the CRP level later this week and then follow up with a chest CT scan. This would really help us take an even better look at the lungs (rule out infection or other pulmonary inflammatory process).    Luciano Pedraza,   11/13/2023 4:00 PM

## 2023-11-13 NOTE — TELEPHONE ENCOUNTER
Called #   Telephone Information:   Mobile [phone]     Advised pt on the information below     Patient stated an understanding and agreed with plan.    Pt is wondering id she can continue taking aleve until the repeat lab? Will it affect the result?     Please advise      Thank you     Azul Frausto RN, BSN  [facility]

## 2023-11-13 NOTE — TELEPHONE ENCOUNTER
"Patient calls to have provider advise on lab results for elevated CRP results. Patient says she is \"really worried because according to [employer] I'm dying of cancer\".     CRP Inflammation   Date Value Ref Range Status   11/09/2023 8.74 (H) <5.00 mg/L Final      Please advise.    Thank you,  Coleman Reeder, Triage RN [facility] Duarte  12:29 PM 11/13/2023         "

## 2023-11-13 NOTE — TELEPHONE ENCOUNTER
Okay to continue taking Aleve. How are her symptoms taking an antiinflammatory?    Luciano Pedraza,   11/13/2023 12:55 PM

## 2023-11-13 NOTE — TELEPHONE ENCOUNTER
Attempt # 1    Called #   Telephone Information:   Mobile [phone]         Left a non detailed VM     Azul Frausto RN, BSN  [facility]

## 2023-11-16 ENCOUNTER — HOSPITAL ENCOUNTER (OUTPATIENT)
Dept: CT IMAGING | Facility: CLINIC | Age: 43
Discharge: HOME | End: 2023-11-16
Attending: FAMILY MEDICINE | Admitting: FAMILY MEDICINE
Payer: COMMERCIAL

## 2023-11-16 ENCOUNTER — MYC MEDICAL ADVICE (OUTPATIENT)
Dept: FAMILY MEDICINE | Facility: CLINIC | Age: 43
End: 2023-11-16
Payer: COMMERCIAL

## 2023-11-16 DIAGNOSIS — R07.9 CHEST PAIN, UNSPECIFIED TYPE: ICD-10-CM

## 2023-11-16 DIAGNOSIS — R79.82 ELEVATED C-REACTIVE PROTEIN (CRP): ICD-10-CM

## 2023-11-16 PROCEDURE — 71250 CT THORAX DX C-: CPT

## 2023-11-18 ENCOUNTER — LAB (OUTPATIENT)
Dept: LAB | Facility: CLINIC | Age: 43
End: 2023-11-18
Payer: COMMERCIAL

## 2023-11-18 DIAGNOSIS — R79.82 ELEVATED C-REACTIVE PROTEIN (CRP): ICD-10-CM

## 2023-11-18 PROCEDURE — 86140 C-REACTIVE PROTEIN: CPT

## 2023-11-18 PROCEDURE — 36415 COLL VENOUS BLD VENIPUNCTURE: CPT

## 2023-11-19 LAB — CRP SERPL-MCNC: 4.69 MG/L

## 2023-12-01 ENCOUNTER — VIRTUAL VISIT (OUTPATIENT)
Dept: FAMILY MEDICINE | Facility: CLINIC | Age: 43
End: 2023-12-01
Payer: COMMERCIAL

## 2023-12-01 DIAGNOSIS — R07.89 CHEST TIGHTNESS: Primary | ICD-10-CM

## 2023-12-01 PROCEDURE — 99213 OFFICE O/P EST LOW 20 MIN: CPT | Mod: VID | Performed by: FAMILY MEDICINE

## 2023-12-01 RX ORDER — HYDROXYZINE HYDROCHLORIDE 25 MG/1
25-50 TABLET, FILM COATED ORAL 3 TIMES DAILY PRN
Qty: 30 TABLET | Refills: 0 | Status: SHIPPED | OUTPATIENT
Start: 2023-12-01 | End: 2024-07-23 | Stop reason: SINTOL

## 2023-12-01 RX ORDER — ALBUTEROL SULFATE 90 UG/1
1-2 AEROSOL, METERED RESPIRATORY (INHALATION) EVERY 6 HOURS PRN
Qty: 8.5 G | Refills: 1 | Status: SHIPPED | OUTPATIENT
Start: 2023-12-01 | End: 2024-04-26

## 2023-12-01 RX ORDER — ALBUTEROL SULFATE 90 UG/1
2 AEROSOL, METERED RESPIRATORY (INHALATION) EVERY 6 HOURS PRN
Qty: 8.5 G | Refills: 1 | Status: SHIPPED | OUTPATIENT
Start: 2023-12-01 | End: 2023-12-01

## 2023-12-01 NOTE — PROGRESS NOTES
Laurie is a 42 year old who is being evaluated via a billable video visit.      How would you like to obtain your AVS? MyChart  If the video visit is dropped, the invitation should be resent by: Text to cell phone: [phone]  Will anyone else be joining your video visit? No          Assessment & Plan     Chest tightness   Unclear etiology; ?anxiety, ?RAD. Will try both hydroxyzine and albuterol inhaler. Consider PFTs in the future or daily anxiolytic. She'll let me know how she is doing within a week.  - hydrOXYzine (ATARAX) 25 MG tablet; Take 1-2 tablets (25-50 mg) by mouth 3 times daily as needed for anxiety  - albuterol (PROAIR HFA/PROVENTIL HFA/VENTOLIN HFA) 108 (90 Base) MCG/ACT inhaler; Inhale 1-2 puffs into the lungs every 6 hours as needed for shortness of breath, wheezing or cough      Luciano Pedraza, [facility]   Laurie is a 42 year old, presenting for the following health issues:  Results      12/1/2023     2:22 PM   Additional Questions   Roomed by Moriah MORTON       History of Present Illness       Reason for visit:  Follow up    She eats 2-3 servings of fruits and vegetables daily.She consumes 0 sweetened beverage(s) daily.She exercises with enough effort to increase her heart rate 10 to 19 minutes per day.  She exercises with enough effort to increase her heart rate 3 or less days per week.   She is taking medications regularly.     Recently had been experiencing some chest pain which has improved. Chest CT negative. She still notices yawning all the time and feels like she can't get a deep breath. Symptoms are improved when laying down and feels the best when she wakes up in the AM. She is a  and has no issues with singing. Denies any cough, wheezing, LE edema.       Review of Systems   Constitutional, HEENT, cardiovascular, pulmonary, gi and gu systems are negative, except as otherwise noted.      Objective           Vitals:  No vitals were  obtained today due to virtual visit.    Physical Exam   GENERAL: Healthy, alert and no distress  EYES: Eyes grossly normal to inspection.  No discharge or erythema, or obvious scleral/conjunctival abnormalities.  RESP: No audible wheeze, cough, or visible cyanosis.  No visible retractions or increased work of breathing.    SKIN: Visible skin clear. No significant rash, abnormal pigmentation or lesions.  NEURO: Cranial nerves grossly intact.  Mentation and speech appropriate for age.  PSYCH: Mentation appears normal, affect normal/bright, judgement and insight intact, normal speech and appearance well-groomed.                Video-Visit Details    Type of service:  Video Visit     Originating Location (pt. Location): Home    Distant Location (provider location):  On-site  Platform used for Video Visit: [employer]

## 2023-12-05 ENCOUNTER — MYC MEDICAL ADVICE (OUTPATIENT)
Dept: FAMILY MEDICINE | Facility: CLINIC | Age: 43
End: 2023-12-05
Payer: COMMERCIAL

## 2023-12-21 ENCOUNTER — MYC MEDICAL ADVICE (OUTPATIENT)
Dept: FAMILY MEDICINE | Facility: CLINIC | Age: 43
End: 2023-12-21
Payer: COMMERCIAL

## 2023-12-28 ENCOUNTER — OFFICE VISIT (OUTPATIENT)
Dept: FAMILY MEDICINE | Facility: CLINIC | Age: 43
End: 2023-12-28
Payer: COMMERCIAL

## 2023-12-28 VITALS
HEIGHT: 65 IN | WEIGHT: 167 LBS | DIASTOLIC BLOOD PRESSURE: 72 MMHG | TEMPERATURE: 98.9 F | SYSTOLIC BLOOD PRESSURE: 118 MMHG | OXYGEN SATURATION: 98 % | BODY MASS INDEX: 27.82 KG/M2 | HEART RATE: 96 BPM | RESPIRATION RATE: 12 BRPM

## 2023-12-28 DIAGNOSIS — H60.502 ACUTE OTITIS EXTERNA OF LEFT EAR, UNSPECIFIED TYPE: Primary | ICD-10-CM

## 2023-12-28 PROCEDURE — 99212 OFFICE O/P EST SF 10 MIN: CPT | Performed by: FAMILY MEDICINE

## 2023-12-28 RX ORDER — CIPROFLOXACIN AND DEXAMETHASONE 3; 1 MG/ML; MG/ML
4 SUSPENSION/ DROPS AURICULAR (OTIC)
COMMUNITY
Start: 2023-12-27 | End: 2024-01-03

## 2023-12-28 ASSESSMENT — PAIN SCALES - GENERAL: PAINLEVEL: NO PAIN (0)

## 2023-12-28 ASSESSMENT — PATIENT HEALTH QUESTIONNAIRE - PHQ9: SUM OF ALL RESPONSES TO PHQ QUESTIONS 1-9: 2

## 2023-12-28 NOTE — PROGRESS NOTES
"  Assessment & Plan     Acute otitis externa of left ear, unspecified type  Otitis externa present in left ear. Continue Ciprodex ear drops. Right ear clear -- there is some dark crusted wax, may be scab. TM is normal.      Luciano Pedraza DO  [facility]OUGIE Sullivan is a 43 year old, presenting for the following health issues:  Ear Problem    Facility:  [facility]  Date of visit: 12/27/23  Reason for visit: bilateral ear pain Rx given ciprofloxacin-dexAMETHasone (CIPRODEX) 0.3-0.1 % otic suspension - pt has done 3 doses of ear drops   ED provider stated rt ear \"does not look normal\", Lt is infected   Current Status:     History of Present Illness       Reason for visit:  Ear  Symptom onset:  1-3 days ago  Symptom intensity:  Moderate  Symptom progression:  Staying the same  Had these symptoms before:  Yes    She eats 2-3 servings of fruits and vegetables daily.She consumes 1 sweetened beverage(s) daily.She exercises with enough effort to increase her heart rate 9 or less minutes per day.  She exercises with enough effort to increase her heart rate 3 or less days per week.   She is taking medications regularly.         Review of Systems   Constitutional, HEENT, cardiovascular, pulmonary, gi and gu systems are negative, except as otherwise noted.      Objective    /72   Pulse 96   Temp 98.9  F (37.2  C) (Tympanic)   Resp 12   Ht 1.651 m (5' 5\")   Wt 75.8 kg (167 lb)   LMP 11/08/2023   SpO2 98%   BMI 27.79 kg/m    Body mass index is 27.79 kg/m .  Physical Exam   GENERAL: healthy, alert and no distress  HENT: normal cephalic/atraumatic, right ear: normal: no effusions, no erythema, normal landmarks, left ear: discharge in canal, nontender  PSYCH: mentation appears normal, affect normal/bright                "

## 2024-01-17 ENCOUNTER — HOSPITAL ENCOUNTER (OUTPATIENT)
Dept: MAMMOGRAPHY | Facility: CLINIC | Age: 44
Discharge: HOME | End: 2024-01-17
Attending: FAMILY MEDICINE | Admitting: FAMILY MEDICINE
Payer: COMMERCIAL

## 2024-01-17 DIAGNOSIS — Z12.31 VISIT FOR SCREENING MAMMOGRAM: ICD-10-CM

## 2024-01-17 PROCEDURE — 77063 BREAST TOMOSYNTHESIS BI: CPT

## 2024-02-26 ENCOUNTER — MYC MEDICAL ADVICE (OUTPATIENT)
Dept: FAMILY MEDICINE | Facility: CLINIC | Age: 44
End: 2024-02-26
Payer: COMMERCIAL

## 2024-04-14 DIAGNOSIS — E03.9 ACQUIRED HYPOTHYROIDISM: ICD-10-CM

## 2024-04-15 RX ORDER — LEVOTHYROXINE SODIUM 50 UG/1
50 TABLET ORAL DAILY
Qty: 90 TABLET | Refills: 0 | Status: SHIPPED | OUTPATIENT
Start: 2024-04-15 | End: 2024-05-11

## 2024-04-16 ENCOUNTER — TRANSFERRED RECORDS (OUTPATIENT)
Dept: HEALTH INFORMATION MANAGEMENT | Facility: CLINIC | Age: 44
End: 2024-04-16
Payer: COMMERCIAL

## 2024-04-26 ENCOUNTER — VIRTUAL VISIT (OUTPATIENT)
Dept: FAMILY MEDICINE | Facility: CLINIC | Age: 44
End: 2024-04-26
Payer: COMMERCIAL

## 2024-04-26 DIAGNOSIS — R03.0 ELEVATED BLOOD PRESSURE READING WITHOUT DIAGNOSIS OF HYPERTENSION: ICD-10-CM

## 2024-04-26 DIAGNOSIS — E03.9 ACQUIRED HYPOTHYROIDISM: Primary | ICD-10-CM

## 2024-04-26 PROCEDURE — 99214 OFFICE O/P EST MOD 30 MIN: CPT | Mod: 95 | Performed by: FAMILY MEDICINE

## 2024-04-26 RX ORDER — FLUCONAZOLE 100 MG/1
200 TABLET ORAL DAILY
COMMUNITY
Start: 2024-04-22 | End: 2024-04-29

## 2024-04-26 NOTE — PROGRESS NOTES
"Laurie is a 43 year old who is being evaluated via a billable video visit.    How would you like to obtain your AVS? MyChart  If the video visit is dropped, the invitation should be resent by: Text to cell phone: [phone]  Will anyone else be joining your video visit? No      Assessment & Plan     Acquired hypothyroidism  Recheck TSH and adjust levothyroxine as needed  - TSH with free T4 reflex; Future    Elevated blood pressure reading without diagnosis of hypertension  Goal BP < 130/80. BP borderline. Recommend low-sodium diet, regular exercise. Continue monitoring. If persistently above 140/90, could consider antihypertensive therapy.    BMI  Estimated body mass index is 27.79 kg/m  as calculated from the following:    Height as of 12/28/23: 1.651 m (5' 5\").    Weight as of 12/28/23: 75.8 kg (167 lb).       Subjective   Laurie is a 43 year old, presenting for the following health issues:  Blood Pressure Check        4/26/2024     2:37 PM   Additional Questions   Roomed by Jimmie PIRES CMA     History of Present Illness       Reason for visit:  High blood pressure/recheck thyroid  Symptom onset:  More than a month  Symptom intensity:  Moderate  Symptom progression:  Staying the same  Had these symptoms before:  No    She eats 2-3 servings of fruits and vegetables daily.She consumes 0 sweetened beverage(s) daily.She exercises with enough effort to increase her heart rate 9 or less minutes per day.  She exercises with enough effort to increase her heart rate 3 or less days per week.   She is taking medications regularly.     Hypertension Follow-up    Do you check your blood pressure regularly outside of the clinic? No, however patient has been to  3 times in the last month and BP has been elevated at all those appointments.    Are you following a low salt diet? No  Are your blood pressures ever more than 140 on the top number (systolic) OR more   than 90 on the bottom number (diastolic), for example 140/90? " N/A    Urgent Care - BP Readings  04/22/24 - 127/89  04/08/24 - 138/93  03/17/24 - 150/93, 142/88    BP Readings from Last 2 Encounters:   12/28/23 118/72   11/09/23 132/82     Hypothyroidism Follow-up    Since last visit, patient describes the following symptoms: Weight stable, no hair loss, no skin changes, no constipation, no loose stools          Review of Systems  Constitutional, HEENT, cardiovascular, pulmonary, gi and gu systems are negative, except as otherwise noted.      Objective       Vitals:  No vitals were obtained today due to virtual visit.    Physical Exam   GENERAL: alert and no distress  EYES: Eyes grossly normal to inspection.  No discharge or erythema, or obvious scleral/conjunctival abnormalities.  RESP: No audible wheeze, cough, or visible cyanosis.    SKIN: Visible skin clear. No significant rash, abnormal pigmentation or lesions.  NEURO: Cranial nerves grossly intact.  Mentation and speech appropriate for age.  PSYCH: Appropriate affect, tone, and pace of words          Video-Visit Details    Type of service:  Video Visit   Originating Location (pt. Location): Home    Distant Location (provider location):  On-site  Platform used for Video Visit: [employer]  Signed Electronically by: Luciano Pedraza DO

## 2024-05-10 ENCOUNTER — LAB (OUTPATIENT)
Dept: LAB | Facility: CLINIC | Age: 44
End: 2024-05-10
Payer: COMMERCIAL

## 2024-05-10 DIAGNOSIS — E03.9 ACQUIRED HYPOTHYROIDISM: ICD-10-CM

## 2024-05-10 PROCEDURE — 36415 COLL VENOUS BLD VENIPUNCTURE: CPT

## 2024-05-10 PROCEDURE — 84443 ASSAY THYROID STIM HORMONE: CPT

## 2024-05-11 DIAGNOSIS — E03.9 ACQUIRED HYPOTHYROIDISM: ICD-10-CM

## 2024-05-11 LAB — TSH SERPL DL<=0.005 MIU/L-ACNC: 1.99 UIU/ML (ref 0.3–4.2)

## 2024-05-11 RX ORDER — LEVOTHYROXINE SODIUM 50 UG/1
50 TABLET ORAL DAILY
Qty: 90 TABLET | Refills: 3 | Status: SHIPPED | OUTPATIENT
Start: 2024-05-11

## 2024-06-11 ENCOUNTER — TRANSFERRED RECORDS (OUTPATIENT)
Dept: HEALTH INFORMATION MANAGEMENT | Facility: CLINIC | Age: 44
End: 2024-06-11
Payer: COMMERCIAL

## 2024-07-10 NOTE — TELEPHONE ENCOUNTER
----- Message from Pepper De La Rosa sent at 7/10/2024 10:56 AM CDT -----  Contact: Pt  [phone]  Pt called in regards to medications that was to be called in pt states she is at the pharmacy now the medication was 2 different  diflucan but not sent in pt states she is waiting at the pharmacy please advise   Please address the my chart message.  TAWANA Lopez RN

## 2024-07-18 SDOH — HEALTH STABILITY: PHYSICAL HEALTH: ON AVERAGE, HOW MANY MINUTES DO YOU ENGAGE IN EXERCISE AT THIS LEVEL?: 30 MIN

## 2024-07-18 SDOH — HEALTH STABILITY: PHYSICAL HEALTH: ON AVERAGE, HOW MANY DAYS PER WEEK DO YOU ENGAGE IN MODERATE TO STRENUOUS EXERCISE (LIKE A BRISK WALK)?: 5 DAYS

## 2024-07-18 ASSESSMENT — SOCIAL DETERMINANTS OF HEALTH (SDOH): HOW OFTEN DO YOU GET TOGETHER WITH FRIENDS OR RELATIVES?: ONCE A WEEK

## 2024-07-23 ENCOUNTER — MYC MEDICAL ADVICE (OUTPATIENT)
Dept: FAMILY MEDICINE | Facility: CLINIC | Age: 44
End: 2024-07-23

## 2024-07-23 ENCOUNTER — OFFICE VISIT (OUTPATIENT)
Dept: FAMILY MEDICINE | Facility: CLINIC | Age: 44
End: 2024-07-23
Payer: COMMERCIAL

## 2024-07-23 VITALS
RESPIRATION RATE: 12 BRPM | HEIGHT: 65 IN | HEART RATE: 76 BPM | OXYGEN SATURATION: 100 % | SYSTOLIC BLOOD PRESSURE: 116 MMHG | DIASTOLIC BLOOD PRESSURE: 68 MMHG | WEIGHT: 169 LBS | BODY MASS INDEX: 28.16 KG/M2 | TEMPERATURE: 98.9 F

## 2024-07-23 DIAGNOSIS — Z13.1 SCREENING FOR DIABETES MELLITUS: ICD-10-CM

## 2024-07-23 DIAGNOSIS — R53.83 OTHER FATIGUE: ICD-10-CM

## 2024-07-23 DIAGNOSIS — L30.9 ECZEMA, UNSPECIFIED TYPE: ICD-10-CM

## 2024-07-23 DIAGNOSIS — Z13.220 SCREENING FOR HYPERLIPIDEMIA: ICD-10-CM

## 2024-07-23 DIAGNOSIS — Z13.21 ENCOUNTER FOR VITAMIN DEFICIENCY SCREENING: ICD-10-CM

## 2024-07-23 DIAGNOSIS — Z11.4 SCREENING FOR HIV (HUMAN IMMUNODEFICIENCY VIRUS): ICD-10-CM

## 2024-07-23 DIAGNOSIS — E03.9 ACQUIRED HYPOTHYROIDISM: ICD-10-CM

## 2024-07-23 DIAGNOSIS — Z00.00 ROUTINE GENERAL MEDICAL EXAMINATION AT A HEALTH CARE FACILITY: Primary | ICD-10-CM

## 2024-07-23 PROBLEM — D25.0 SUBMUCOUS LEIOMYOMA OF UTERUS: Status: ACTIVE | Noted: 2023-07-26

## 2024-07-23 LAB
ALBUMIN SERPL BCG-MCNC: 4.3 G/DL (ref 3.5–5.2)
ALP SERPL-CCNC: 54 U/L (ref 40–150)
ALT SERPL W P-5'-P-CCNC: 14 U/L (ref 0–50)
ANION GAP SERPL CALCULATED.3IONS-SCNC: 10 MMOL/L (ref 7–15)
AST SERPL W P-5'-P-CCNC: 21 U/L (ref 0–45)
BILIRUB SERPL-MCNC: 1.3 MG/DL
BUN SERPL-MCNC: 10.2 MG/DL (ref 6–20)
CALCIUM SERPL-MCNC: 9 MG/DL (ref 8.8–10.4)
CHLORIDE SERPL-SCNC: 106 MMOL/L (ref 98–107)
CHOLEST SERPL-MCNC: 161 MG/DL
CREAT SERPL-MCNC: 0.57 MG/DL (ref 0.51–0.95)
EGFRCR SERPLBLD CKD-EPI 2021: >90 ML/MIN/1.73M2
ERYTHROCYTE [DISTWIDTH] IN BLOOD BY AUTOMATED COUNT: 12.4 % (ref 10–15)
FASTING STATUS PATIENT QL REPORTED: YES
FASTING STATUS PATIENT QL REPORTED: YES
FERRITIN SERPL-MCNC: 96 NG/ML (ref 6–175)
GLUCOSE SERPL-MCNC: 85 MG/DL (ref 70–99)
HCO3 SERPL-SCNC: 24 MMOL/L (ref 22–29)
HCT VFR BLD AUTO: 40.8 % (ref 35–47)
HDLC SERPL-MCNC: 63 MG/DL
HGB BLD-MCNC: 13.4 G/DL (ref 11.7–15.7)
HIV 1+2 AB+HIV1 P24 AG SERPL QL IA: NONREACTIVE
LDLC SERPL CALC-MCNC: 81 MG/DL
MCH RBC QN AUTO: 31.1 PG (ref 26.5–33)
MCHC RBC AUTO-ENTMCNC: 32.8 G/DL (ref 31.5–36.5)
MCV RBC AUTO: 95 FL (ref 78–100)
NONHDLC SERPL-MCNC: 98 MG/DL
PLATELET # BLD AUTO: 320 10E3/UL (ref 150–450)
POTASSIUM SERPL-SCNC: 4.4 MMOL/L (ref 3.4–5.3)
PROT SERPL-MCNC: 7 G/DL (ref 6.4–8.3)
RBC # BLD AUTO: 4.31 10E6/UL (ref 3.8–5.2)
SODIUM SERPL-SCNC: 140 MMOL/L (ref 135–145)
TRIGL SERPL-MCNC: 86 MG/DL
VIT D+METAB SERPL-MCNC: 33 NG/ML (ref 20–50)
WBC # BLD AUTO: 8.1 10E3/UL (ref 4–11)

## 2024-07-23 PROCEDURE — 99396 PREV VISIT EST AGE 40-64: CPT | Performed by: FAMILY MEDICINE

## 2024-07-23 PROCEDURE — 80053 COMPREHEN METABOLIC PANEL: CPT | Performed by: FAMILY MEDICINE

## 2024-07-23 PROCEDURE — 85027 COMPLETE CBC AUTOMATED: CPT | Performed by: FAMILY MEDICINE

## 2024-07-23 PROCEDURE — 82306 VITAMIN D 25 HYDROXY: CPT | Performed by: FAMILY MEDICINE

## 2024-07-23 PROCEDURE — 87389 HIV-1 AG W/HIV-1&-2 AB AG IA: CPT | Performed by: FAMILY MEDICINE

## 2024-07-23 PROCEDURE — 80061 LIPID PANEL: CPT | Performed by: FAMILY MEDICINE

## 2024-07-23 PROCEDURE — 82728 ASSAY OF FERRITIN: CPT | Performed by: FAMILY MEDICINE

## 2024-07-23 PROCEDURE — 36415 COLL VENOUS BLD VENIPUNCTURE: CPT | Performed by: FAMILY MEDICINE

## 2024-07-23 RX ORDER — CLOBETASOL PROPIONATE 0.5 MG/G
CREAM TOPICAL
Qty: 60 G | Refills: 3 | Status: SHIPPED | OUTPATIENT
Start: 2024-07-23

## 2024-07-23 NOTE — PROGRESS NOTES
"Preventive Care Visit  [facility] [place]  Luciano Pedraza DO, Family Medicine  Jul 23, 2024    Assessment & Plan   1. Routine general medical examination at a health care facility  Health maintenance reviewed and updated. Emphasized importance of balanced diet and regular exercise.  Anxiety improved right now but more related to work. Consider alternative selective serotonin reuptake inhibitor in the fall vs referral to therapy. She'll let me know if she'd like either of these. Could also do Genesight testing.     2. Acquired hypothyroidism  Last TSH at goal. Continue levothyroxine    3. Eczema, unspecified type  Use as needed  - clobetasol propionate (TEMOVATE) 0.05 % external cream; Apply thin layer of cream to affected areas on hands twice daily for 14 days or as needed.  Dispense: 60 g; Refill: 3    4. Screening for HIV (human immunodeficiency virus)  New sexual partner for past 3 years, no concerns for STDs or HIV but can screen to be safe  - HIV Antigen Antibody Combo; Future    5. Other fatigue  Check CBC and ferritin, TSH at goal. Will also check blood sugars and vitamin D levels. Family history of sleep apnea and she may snore. We could consider sleep study in the future.  - CBC with platelets; Future  - Ferritin; Future    6. Screening for hyperlipidemia  - Lipid panel reflex to direct LDL Fasting; Future    7. Screening for diabetes mellitus  - Comprehensive metabolic panel (BMP + Alb, Alk Phos, ALT, AST, Total. Bili, TP); Future    8. Encounter for vitamin deficiency screening  - Vitamin D Deficiency; Future        Patient has been advised of split billing requirements and indicates understanding: Yes        BMI  Estimated body mass index is 28.12 kg/m  as calculated from the following:    Height as of this encounter: 1.651 m (5' 5\").    Weight as of this encounter: 76.7 kg (169 lb).       Counseling  Appropriate preventive services were addressed with this patient via screening, " questionnaire, or discussion as appropriate for fall prevention, nutrition, physical activity, Tobacco-use cessation, weight loss and cognition.  Checklist reviewing preventive services available has been given to the patient.  Reviewed patient's diet, addressing concerns and/or questions.       Subjective   Laurie is a 43 year old, presenting for the following:  Physical        7/23/2024     6:57 AM   Additional Questions   Roomed by Genet CRUZ   Accompanied by Self        Health Care Directive  Patient does not have a Health Care Directive or Living Will: Patient states has Advance Directive and will bring in a copy to clinic.    HPI      Follow up on blood pressure - was high over the summer -- has been working out.      Anxiety -- Hydroxyzine made her feel very groggy, foggy.   Tried Prozac and was yawning all the time. Not tired but just yawning.        11/18/2019    10:24 AM 1/26/2023     4:07 PM 8/11/2023     8:33 AM   JAKE-7 SCORE   Total Score  3 (minimal anxiety) 5 (mild anxiety)   Total Score 0 3 5         1/26/2023     4:07 PM 8/11/2023     8:32 AM 12/28/2023    11:30 AM   PHQ   PHQ-9 Total Score 2 2 2   Q9: Thoughts of better off dead/self-harm past 2 weeks Not at all Not at all Not at all       Feels tired all the time. She has been working out regularly and TSH was normal. Sleeping well and wakes up well rested but by 5 PM feels exhausted.         7/18/2024   General Health   How would you rate your overall physical health? Good   Feel stress (tense, anxious, or unable to sleep) Rather much      (!) STRESS CONCERN      7/18/2024   Nutrition   Three or more servings of calcium each day? Yes   Diet: Regular (no restrictions)   How many servings of fruit and vegetables per day? 4 or more   How many sweetened beverages each day? 0-1          7/18/2024   Exercise   Days per week of moderate/strenous exercise 5 days   Average minutes spent exercising at this level 30 min          7/18/2024   Social Factors    Frequency of gathering with friends or relatives Once a week   Worry food won't last until get money to buy more No   Food not last or not have enough money for food? No   Do you have housing? (Housing is defined as stable permanent housing and does not include staying ouside in a car, in a tent, in an abandoned building, in an overnight shelter, or couch-surfing.) Yes   Are you worried about losing your housing? No   Lack of transportation? No   Unable to get utilities (heat,electricity)? No            7/18/2024   Dental   Dentist two times every year? Yes            7/18/2024   Substance Use   Alcohol more than 3/day or more than 7/wk No   Do you use any other substances recreationally? No        Social History     Tobacco Use    Smoking status: Never    Smokeless tobacco: Never   Vaping Use    Vaping status: Never Used   Substance Use Topics    Alcohol use: Yes     Comment: infrequent    Drug use: No           1/17/2024   LAST FHS-7 RESULTS   1st degree relative breast or ovarian cancer No   Any relative bilateral breast cancer No   Any male have breast cancer No   Any ONE woman have BOTH breast AND ovarian cancer No   Any woman with breast cancer before 50yrs No   2 or more relatives with breast AND/OR ovarian cancer No   2 or more relatives with breast AND/OR bowel cancer No      Mammogram Screening - Mammogram every 1-2 years updated in Health Maintenance based on mutual decision making        7/18/2024   STI Screening   New sexual partner(s) since last STI/HIV test? (!) YES        History of abnormal Pap smear: No - age 30- 64 PAP with HPV every 5 years recommended        Latest Ref Rng & Units 7/17/2023     2:50 PM 8/11/2022    10:37 AM 11/20/2018     5:11 PM   PAP / HPV   PAP  Negative for Intraepithelial Lesion or Malignancy (NILM)  Negative for Intraepithelial Lesion or Malignancy (NILM)     HPV 16 DNA Negative Negative  Negative  Negative    HPV 18 DNA Negative Negative  Negative  Negative    Other HR  "HPV Negative Negative  Negative  Negative      ASCVD Risk   The 10-year ASCVD risk score (Kylie WADDELL, et al., 2019) is: 0.2%    Values used to calculate the score:      Age: 43 years      Sex: Female      Is Non- : No      Diabetic: No      Tobacco smoker: No      Systolic Blood Pressure: 116 mmHg      Is BP treated: No      HDL Cholesterol: 66 mg/dL      Total Cholesterol: 132 mg/dL        7/18/2024   Contraception/Family Planning   Questions about contraception or family planning No        Reviewed and updated as needed this visit by Provider   Tobacco     Med Hx  Surg Hx  Fam Hx  Soc Hx Sexual Activity          Review of Systems  Constitutional, HEENT, cardiovascular, pulmonary, gi and gu systems are negative, except as otherwise noted.     Objective    Exam  /68 (BP Location: Right arm, Patient Position: Chair, Cuff Size: Adult Regular)   Pulse 76   Temp 98.9  F (37.2  C) (Tympanic)   Resp 12   Ht 1.651 m (5' 5\")   Wt 76.7 kg (169 lb)   LMP 07/17/2024 (Exact Date)   SpO2 100%   BMI 28.12 kg/m     Estimated body mass index is 28.12 kg/m  as calculated from the following:    Height as of this encounter: 1.651 m (5' 5\").    Weight as of this encounter: 76.7 kg (169 lb).    Physical Exam  GENERAL: alert and no distress  EYES: Eyes grossly normal to inspection  HENT: ear canals and TM's normal, nose and mouth without ulcers or lesions  NECK: no adenopathy, no asymmetry, masses, or scars  RESP: lungs clear to auscultation - no rales, rhonchi or wheezes  CV: regular rates and rhythm, normal S1 S2, no S3 or S4, and no murmur, click or rub  MS: no gross musculoskeletal defects noted, no edema  SKIN: no suspicious lesions or rashes  PSYCH: mentation appears normal, affect normal/bright        Signed Electronically by: Luciano Pedraza DO  "

## 2024-07-23 NOTE — PATIENT INSTRUCTIONS
Patient Education   Preventive Care Advice   This is general advice given by our system to help you stay healthy. However, your care team may have specific advice just for you. Please talk to your care team about your preventive care needs.  Nutrition  Eat 5 or more servings of fruits and vegetables each day.  Try wheat bread, brown rice and whole grain pasta (instead of white bread, rice, and pasta).  Get enough calcium and vitamin D. Check the label on foods and aim for 100% of the RDA (recommended daily allowance).  Lifestyle  Exercise at least 150 minutes each week  (30 minutes a day, 5 days a week).  Do muscle strengthening activities 2 days a week. These help control your weight and prevent disease.  No smoking.  Wear sunscreen to prevent skin cancer.  Have a dental exam and cleaning every 6 months.  Yearly exams  See your health care team every year to talk about:  Any changes in your health.  Any medicines your care team has prescribed.  Preventive care, family planning, and ways to prevent chronic diseases.  Shots (vaccines)   HPV shots (up to age 26), if you've never had them before.  Hepatitis B shots (up to age 59), if you've never had them before.  COVID-19 shot: Get this shot when it's due.  Flu shot: Get a flu shot every year.  Tetanus shot: Get a tetanus shot every 10 years.  Pneumococcal, hepatitis A, and RSV shots: Ask your care team if you need these based on your risk.  Shingles shot (for age 50 and up)  General health tests  Diabetes screening:  Starting at age 35, Get screened for diabetes at least every 3 years.  If you are younger than age 35, ask your care team if you should be screened for diabetes.  Cholesterol test: At age 39, start having a cholesterol test every 5 years, or more often if advised.  Bone density scan (DEXA): At age 50, ask your care team if you should have this scan for osteoporosis (brittle bones).  Hepatitis C: Get tested at least once in your life.  STIs (sexually  transmitted infections)  Before age 24: Ask your care team if you should be screened for STIs.  After age 24: Get screened for STIs if you're at risk. You are at risk for STIs (including HIV) if:  You are sexually active with more than one person.  You don't use condoms every time.  You or a partner was diagnosed with a sexually transmitted infection.  If you are at risk for HIV, ask about PrEP medicine to prevent HIV.  Get tested for HIV at least once in your life, whether you are at risk for HIV or not.  Cancer screening tests  Cervical cancer screening: If you have a cervix, begin getting regular cervical cancer screening tests starting at age 21.  Breast cancer scan (mammogram): If you've ever had breasts, begin having regular mammograms starting at age 40. This is a scan to check for breast cancer.  Colon cancer screening: It is important to start screening for colon cancer at age 45.  Have a colonoscopy test every 10 years (or more often if you're at risk) Or, ask your provider about stool tests like a FIT test every year or Cologuard test every 3 years.  To learn more about your testing options, visit:   .  For help making a decision, visit:   https://bit.ly/ue10671.  Prostate cancer screening test: If you have a prostate, ask your care team if a prostate cancer screening test (PSA) at age 55 is right for you.  Lung cancer screening: If you are a current or former smoker ages 50 to 80, ask your care team if ongoing lung cancer screenings are right for you.  For informational purposes only. Not to replace the advice of your health care provider. Copyright   2023 [facility] [employer]. All rights reserved. Clinically reviewed by the [facility] Transitions Program. [address] 537920 - REV 01/24.     Patient Education   Preventive Care Advice   This is general advice given by our system to help you stay healthy. However, your care team may have specific advice just for you. Please talk to your care team  about your preventive care needs.  Nutrition  Eat 5 or more servings of fruits and vegetables each day.  Try wheat bread, brown rice and whole grain pasta (instead of white bread, rice, and pasta).  Get enough calcium and vitamin D. Check the label on foods and aim for 100% of the RDA (recommended daily allowance).  Lifestyle  Exercise at least 150 minutes each week  (30 minutes a day, 5 days a week).  Do muscle strengthening activities 2 days a week. These help control your weight and prevent disease.  No smoking.  Wear sunscreen to prevent skin cancer.  Have a dental exam and cleaning every 6 months.  Yearly exams  See your health care team every year to talk about:  Any changes in your health.  Any medicines your care team has prescribed.  Preventive care, family planning, and ways to prevent chronic diseases.  Shots (vaccines)   HPV shots (up to age 26), if you've never had them before.  Hepatitis B shots (up to age 59), if you've never had them before.  COVID-19 shot: Get this shot when it's due.  Flu shot: Get a flu shot every year.  Tetanus shot: Get a tetanus shot every 10 years.  Pneumococcal, hepatitis A, and RSV shots: Ask your care team if you need these based on your risk.  Shingles shot (for age 50 and up)  General health tests  Diabetes screening:  Starting at age 35, Get screened for diabetes at least every 3 years.  If you are younger than age 35, ask your care team if you should be screened for diabetes.  Cholesterol test: At age 39, start having a cholesterol test every 5 years, or more often if advised.  Bone density scan (DEXA): At age 50, ask your care team if you should have this scan for osteoporosis (brittle bones).  Hepatitis C: Get tested at least once in your life.  STIs (sexually transmitted infections)  Before age 24: Ask your care team if you should be screened for STIs.  After age 24: Get screened for STIs if you're at risk. You are at risk for STIs (including HIV) if:  You are  sexually active with more than one person.  You don't use condoms every time.  You or a partner was diagnosed with a sexually transmitted infection.  If you are at risk for HIV, ask about PrEP medicine to prevent HIV.  Get tested for HIV at least once in your life, whether you are at risk for HIV or not.  Cancer screening tests  Cervical cancer screening: If you have a cervix, begin getting regular cervical cancer screening tests starting at age 21.  Breast cancer scan (mammogram): If you've ever had breasts, begin having regular mammograms starting at age 40. This is a scan to check for breast cancer.  Colon cancer screening: It is important to start screening for colon cancer at age 45.  Have a colonoscopy test every 10 years (or more often if you're at risk) Or, ask your provider about stool tests like a FIT test every year or Cologuard test every 3 years.  To learn more about your testing options, visit:   .  For help making a decision, visit:   https://bit.ly/dy55309.  Prostate cancer screening test: If you have a prostate, ask your care team if a prostate cancer screening test (PSA) at age 55 is right for you.  Lung cancer screening: If you are a current or former smoker ages 50 to 80, ask your care team if ongoing lung cancer screenings are right for you.  For informational purposes only. Not to replace the advice of your health care provider. Copyright   2023 [employer]. All rights reserved. Clinically reviewed by the [facility] Transitions Program. [employer] 518821 - REV 01/24.

## 2024-11-05 ENCOUNTER — TRANSCRIBE ORDERS (OUTPATIENT)
Dept: GASTROENTEROLOGY | Facility: CLINIC | Age: 44
End: 2024-11-05
Payer: COMMERCIAL

## 2024-11-05 DIAGNOSIS — Z12.11 SPECIAL SCREENING FOR MALIGNANT NEOPLASM OF COLON: Primary | ICD-10-CM

## 2024-11-08 ENCOUNTER — TELEPHONE (OUTPATIENT)
Dept: GASTROENTEROLOGY | Facility: CLINIC | Age: 44
End: 2024-11-08
Payer: COMMERCIAL

## 2024-11-08 NOTE — TELEPHONE ENCOUNTER
"Endoscopy Scheduling Screen    Have you had any respiratory illness or flu-like symptoms in the last 10 days?  No    What is your communication preference for Instructions and/or Bowel Prep?   MyChart    What insurance is in the chart?  Other:  HP's    Ordering/Referring Provider: Maddie Mccrary MD in [facility]  Moderate/Conscious Sedation   (If ordering provider performs procedure, schedule with ordering provider unless otherwise instructed. )    BMI: Estimated body mass index is 28.12 kg/m  as calculated from the following:    Height as of 7/23/24: 1.651 m (5' 5\").    Weight as of 7/23/24: 76.7 kg (169 lb).     Sedation Ordered  moderate sedation.   If patient BMI > 50 do not schedule in [facility].    If patient BMI > 45 do not schedule at [facility].    Are you taking methadone or Suboxone?  NO, No RN review required.    Have you been diagnosed and are being treated for severe PTSD or severe anxiety?  NO, No RN review required.    Are you taking any prescription medications for pain 3 or more times per week?   NO, No RN review required.    Do you have a history of malignant hyperthermia?  No    (Females) Are you currently pregnant?   No     Have you been diagnosed or told you have pulmonary hypertension?   No    Do you have an LVAD?  No    Have you been told you have moderate to severe sleep apnea?  No.    Have you been told you have COPD, asthma, or any other lung disease?  No    Do you have any heart conditions?  No     Have you ever had or are you waiting for an organ transplant?  No. Continue scheduling, no site restrictions.    Have you had a stroke or transient ischemic attack (TIA aka \"mini stroke\" in the last 6 months?   No    Have you been diagnosed with or been told you have cirrhosis of the liver?   No.    Are you currently on dialysis?   No    Do you need assistance transferring?   No    BMI: Estimated body mass index is 28.12 kg/m  as calculated from the following:    Height as of 7/23/24: 1.651 m (5' " "5\").    Weight as of 7/23/24: 76.7 kg (169 lb).     Is patients BMI > 40 and scheduling location [facility]?  No    Do you take an injectable or oral medication for weight loss or diabetes (excluding insulin)?  No    Do you take the medication Naltrexone?  No    Do you take blood thinners?  No       Prep   Are you currently on dialysis or do you have chronic kidney disease?  No    Do you have a diagnosis of diabetes?  No    Do you have a diagnosis of cystic fibrosis (CF)?  No    On a regular basis do you go 3 -5 days between bowel movements?  No    BMI > 40?  No    Preferred Pharmacy:    [id number] - [place], [place] - [address] [address]  [address] [facility] [address] [facility] [address]  Phone: [phone] Fax: [phone]    Final Scheduling Details     Procedure scheduled  Colonoscopy    Surgeon:  Katerine     Date of procedure:  11.27.24     Pre-OP / PAC:   No - Not required for this site.    Location  [facility] - Patient preference.    Sedation   Moderate Sedation - Per order.      Patient Reminders:   You will receive a call from a Nurse to review instructions and health history.  This assessment must be completed prior to your procedure.  Failure to complete the Nurse assessment may result in the procedure being cancelled.      On the day of your procedure, please designate an adult(s) who can drive you home stay with you for the next 24 hours. The medicines used in the exam will make you sleepy. You will not be able to drive.      You cannot take public transportation, ride share services, or non-medical taxi service without a responsible caregiver.  Medical transport services are allowed with the requirement that a responsible caregiver will receive you at your destination.  We require that drivers and caregivers are confirmed prior to your procedure.    "

## 2024-11-17 ENCOUNTER — TELEPHONE (OUTPATIENT)
Dept: GASTROENTEROLOGY | Facility: CLINIC | Age: 44
End: 2024-11-17
Payer: COMMERCIAL

## 2024-11-17 NOTE — TELEPHONE ENCOUNTER
Pre visit planning completed.      Procedure details:    Patient scheduled for Colonoscopy on 11/27/2024.     Arrival time: 1155. Procedure time 1240    Facility location: [facility]; [address] [place] [address]. Check in location: Main entrance, door #1 on the North side of the building under roundabout awning. DO NOT GO TO SURGERY/ED ENTRANCE.     Sedation type: Conscious sedation     Pre op exam needed? No.    Indication for procedure: Screening       Chart review:     Electronic implanted devices? No    Recent diagnosis of diverticulitis within the last 6 weeks? No      Medication review:    Diabetic? No    Anticoagulants? No    Weight loss medication/injectable? No.    Other medication HOLDING recommendations:  N/A      Prep for procedure:     Bowel prep recommendation: Standard Miralax  Due to: standard bowel prep.    Prep instructions sent via [email or web address]         Argentina Melton RN  Endoscopy Procedure Pre Assessment   [phone] option 2

## 2024-11-18 NOTE — TELEPHONE ENCOUNTER
Attempted to contact patient in order to complete pre assessment questions.     Patient answered but it was not a good time. Patient will return call to [phone] option 2    Callback required communication sent via [email or web address].      Thalia Paulson RN  Endoscopy Procedure Pre Assessment

## 2024-11-18 NOTE — TELEPHONE ENCOUNTER
Pre assessment completed for upcoming procedure.   (Please see previous telephone encounter notes for complete details)    Patient  returned call.       Procedure details:    Arrival time and facility location reviewed.    Pre op exam needed? No.    Designated  policy reviewed. Instructed to have someone stay 6  hours post procedure.       Medication review:    Medications reviewed. Please see supporting documentation below. Holding recommendations discussed (if applicable).       Prep for procedure:     Procedure prep instructions reviewed.        Any additional information needed:  N/A      Patient  verbalized understanding and had no questions or concerns at this time.      Jess Pedro RN  Endoscopy Procedure Pre Assessment   [phone] option 2

## 2024-11-27 ENCOUNTER — HOSPITAL ENCOUNTER (OUTPATIENT)
Facility: CLINIC | Age: 44
Discharge: HOME | End: 2024-11-27
Attending: COLON & RECTAL SURGERY | Admitting: COLON & RECTAL SURGERY
Payer: COMMERCIAL

## 2024-11-27 VITALS
OXYGEN SATURATION: 99 % | HEIGHT: 65 IN | HEART RATE: 77 BPM | RESPIRATION RATE: 16 BRPM | SYSTOLIC BLOOD PRESSURE: 112 MMHG | BODY MASS INDEX: 28.16 KG/M2 | DIASTOLIC BLOOD PRESSURE: 78 MMHG | WEIGHT: 169 LBS

## 2024-11-27 LAB — COLONOSCOPY: NORMAL

## 2024-11-27 PROCEDURE — 999N000127 HC STATISTIC PERIPHERAL IV START W US GUIDANCE

## 2024-11-27 PROCEDURE — G0105 COLORECTAL SCRN; HI RISK IND: HCPCS | Performed by: COLON & RECTAL SURGERY

## 2024-11-27 PROCEDURE — G0500 MOD SEDAT ENDO SERVICE >5YRS: HCPCS | Performed by: COLON & RECTAL SURGERY

## 2024-11-27 PROCEDURE — 250N000011 HC RX IP 250 OP 636: Performed by: COLON & RECTAL SURGERY

## 2024-11-27 PROCEDURE — 45378 DIAGNOSTIC COLONOSCOPY: CPT | Performed by: COLON & RECTAL SURGERY

## 2024-11-27 RX ORDER — ONDANSETRON 4 MG/1
4 TABLET, ORALLY DISINTEGRATING ORAL EVERY 6 HOURS PRN
Status: DISCONTINUED | OUTPATIENT
Start: 2024-11-27 | End: 2024-11-27 | Stop reason: HOSPADM

## 2024-11-27 RX ORDER — DIPHENHYDRAMINE HYDROCHLORIDE 50 MG/ML
25-50 INJECTION INTRAMUSCULAR; INTRAVENOUS
Status: DISCONTINUED | OUTPATIENT
Start: 2024-11-27 | End: 2024-11-27 | Stop reason: HOSPADM

## 2024-11-27 RX ORDER — SIMETHICONE 40MG/0.6ML
133 SUSPENSION, DROPS(FINAL DOSAGE FORM)(ML) ORAL
Status: DISCONTINUED | OUTPATIENT
Start: 2024-11-27 | End: 2024-11-27 | Stop reason: HOSPADM

## 2024-11-27 RX ORDER — FLUMAZENIL 0.1 MG/ML
0.2 INJECTION, SOLUTION INTRAVENOUS
Status: DISCONTINUED | OUTPATIENT
Start: 2024-11-27 | End: 2024-11-27 | Stop reason: HOSPADM

## 2024-11-27 RX ORDER — NALOXONE HYDROCHLORIDE 0.4 MG/ML
0.4 INJECTION, SOLUTION INTRAMUSCULAR; INTRAVENOUS; SUBCUTANEOUS
Status: DISCONTINUED | OUTPATIENT
Start: 2024-11-27 | End: 2024-11-27 | Stop reason: HOSPADM

## 2024-11-27 RX ORDER — NALOXONE HYDROCHLORIDE 0.4 MG/ML
0.2 INJECTION, SOLUTION INTRAMUSCULAR; INTRAVENOUS; SUBCUTANEOUS
Status: DISCONTINUED | OUTPATIENT
Start: 2024-11-27 | End: 2024-11-27 | Stop reason: HOSPADM

## 2024-11-27 RX ORDER — FENTANYL CITRATE 50 UG/ML
50-100 INJECTION, SOLUTION INTRAMUSCULAR; INTRAVENOUS EVERY 5 MIN PRN
Status: DISCONTINUED | OUTPATIENT
Start: 2024-11-27 | End: 2024-11-27 | Stop reason: HOSPADM

## 2024-11-27 RX ORDER — EPINEPHRINE 1 MG/ML
0.1 INJECTION, SOLUTION, CONCENTRATE INTRAVENOUS
Status: DISCONTINUED | OUTPATIENT
Start: 2024-11-27 | End: 2024-11-27 | Stop reason: HOSPADM

## 2024-11-27 RX ORDER — ONDANSETRON 2 MG/ML
4 INJECTION INTRAMUSCULAR; INTRAVENOUS
Status: COMPLETED | OUTPATIENT
Start: 2024-11-27 | End: 2024-11-27

## 2024-11-27 RX ORDER — LIDOCAINE 40 MG/G
CREAM TOPICAL
Status: DISCONTINUED | OUTPATIENT
Start: 2024-11-27 | End: 2024-11-27 | Stop reason: HOSPADM

## 2024-11-27 RX ORDER — ATROPINE SULFATE 0.1 MG/ML
1 INJECTION INTRAVENOUS
Status: DISCONTINUED | OUTPATIENT
Start: 2024-11-27 | End: 2024-11-27 | Stop reason: HOSPADM

## 2024-11-27 RX ORDER — ONDANSETRON 2 MG/ML
4 INJECTION INTRAMUSCULAR; INTRAVENOUS EVERY 6 HOURS PRN
Status: DISCONTINUED | OUTPATIENT
Start: 2024-11-27 | End: 2024-11-27 | Stop reason: HOSPADM

## 2024-11-27 RX ORDER — PROCHLORPERAZINE MALEATE 10 MG
10 TABLET ORAL EVERY 6 HOURS PRN
Status: DISCONTINUED | OUTPATIENT
Start: 2024-11-27 | End: 2024-11-27 | Stop reason: HOSPADM

## 2024-11-27 RX ADMIN — ONDANSETRON 4 MG: 2 INJECTION INTRAMUSCULAR; INTRAVENOUS at 12:27

## 2024-11-27 RX ADMIN — MIDAZOLAM HYDROCHLORIDE 2 MG: 1 INJECTION, SOLUTION INTRAMUSCULAR; INTRAVENOUS at 13:17

## 2024-11-27 RX ADMIN — FENTANYL CITRATE 100 MCG: 50 INJECTION, SOLUTION INTRAMUSCULAR; INTRAVENOUS at 13:17

## 2024-11-27 ASSESSMENT — ACTIVITIES OF DAILY LIVING (ADL)
ADLS_ACUITY_SCORE: 51

## 2024-11-27 NOTE — H&P
Pre-Endoscopy History and Physical     Laurie Diaz MRN# [id number]   YOB: 1980 Age: 43 year old     Date of Procedure: 11/27/2024  Primary care provider: Luciano Pedraza  Type of Endoscopy: colonoscopy  Reason for Procedure: surveillance, personal hx of adenomatous polyps  Type of Anesthesia Anticipated: Moderate Sedation    HPI:    Laurie is a 43 year old female who will be undergoing the above procedure.      A history and physical has been performed. The patient's medications and allergies have been reviewed. The risks and benefits of the procedure and the sedation options and risks were discussed with the patient.  All questions were answered and informed consent was obtained.      She denies a personal or family history of anesthesia complications or bleeding disorders.     No Known Allergies     Prior to Admission Medications   Prescriptions Last Dose Informant Patient Reported? Taking?   clobetasol propionate (TEMOVATE) 0.05 % external cream Past Month  No Yes   Sig: Apply thin layer of cream to affected areas on hands twice daily for 14 days or as needed.   levothyroxine (SYNTHROID/LEVOTHROID) 50 MCG tablet 11/27/2024  No Yes   Sig: Take 1 tablet (50 mcg) by mouth daily   norgestrel-ethinyl estradiol (LO/OVRAL) 0.3-30 MG-MCG tablet 11/27/2024  No Yes   Sig: Take 1 tablet by mouth daily      Facility-Administered Medications: None       Patient Active Problem List   Diagnosis    Acquired hypothyroidism    Complex cyst of left ovary    Request for sterilization    Pelvic floor dysfunction    Right sided sciatica    Submucous leiomyoma of uterus        Past Medical History:   Diagnosis Date    Anxiety 09/19/2018    Complication of anesthesia     Hypothyroidism     Motion sickness     Paresthesia     PONV (postoperative nausea and vomiting)     Rectal bleeding - resolved, likely secondary to fissure 05/06/2019        Past Surgical History:   Procedure Laterality Date    COLONOSCOPY  August 2021  "   DAVINCI SALPINGECTOMY Left 11/24/2020    Procedure: Robotic assisted laparoscopic left ovarian cystectomy, bilateral salpingectomy;  Surgeon: Rosalba Pierre MD;  Location: [facility] OR    WRIST SURGERY  08/2015    Wrist surgery for torn ligaments       Social History     Tobacco Use    Smoking status: Never    Smokeless tobacco: Never   Substance Use Topics    Alcohol use: Yes     Comment: infrequent       Family History   Problem Relation Age of Onset    Hypothyroidism Mother     Thyroid Disease Mother     Hypertension Father     Heart Disease Father         heart attack    Diabetes Father     No Known Problems Sister     No Known Problems Daughter     No Known Problems Son     Ovarian Cancer Paternal Aunt     Ovarian Cancer Paternal Grandmother        REVIEW OF SYSTEMS:     5 point ROS negative except as noted above in HPI, including Gen., Resp., CV, GI &  system review.      PHYSICAL EXAM:   There were no vitals taken for this visit. Estimated body mass index is 28.12 kg/m  as calculated from the following:    Height as of 7/23/24: 1.651 m (5' 5\").    Weight as of 7/23/24: 76.7 kg (169 lb).   GENERAL APPEARANCE: healthy and alert  MENTAL STATUS: alert  AIRWAY EXAM: Mallampatti Class II (visualization of the soft palate, fauces, and uvula)  RESP: lungs clear to auscultation - no rales, rhonchi or wheezes  CV: regular rates and rhythm      DIAGNOSTICS:    Not indicated      IMPRESSION   ASA Class 1 - Healthy patient, no medical problems        PLAN:       Plan for colonoscopy. We discussed the risks, benefits and alternatives and the patient wished to proceed.    The above has been forwarded to the consulting provider.      Signed Electronically by: Monica Garcias MD  November 27, 2024    "

## 2024-12-18 ENCOUNTER — PATIENT OUTREACH (OUTPATIENT)
Dept: CARE COORDINATION | Facility: CLINIC | Age: 44
End: 2024-12-18
Payer: COMMERCIAL

## 2024-12-23 ENCOUNTER — TRANSFERRED RECORDS (OUTPATIENT)
Dept: HEALTH INFORMATION MANAGEMENT | Facility: CLINIC | Age: 44
End: 2024-12-23

## 2024-12-23 ENCOUNTER — LAB REQUISITION (OUTPATIENT)
Dept: LAB | Facility: CLINIC | Age: 44
End: 2024-12-23
Payer: COMMERCIAL

## 2024-12-23 DIAGNOSIS — B37.0 CANDIDAL STOMATITIS: ICD-10-CM

## 2024-12-23 PROCEDURE — 87102 FUNGUS ISOLATION CULTURE: CPT | Mod: ORL | Performed by: OTOLARYNGOLOGY

## 2024-12-23 PROCEDURE — 87070 CULTURE OTHR SPECIMN AEROBIC: CPT | Mod: ORL | Performed by: OTOLARYNGOLOGY

## 2024-12-23 PROCEDURE — 87106 FUNGI IDENTIFICATION YEAST: CPT | Mod: ORL | Performed by: OTOLARYNGOLOGY

## 2024-12-25 LAB — BACTERIA SPEC CULT: NORMAL

## 2024-12-26 LAB — BACTERIA SPEC CULT: NORMAL

## 2025-01-06 LAB — BACTERIA SPEC CULT: ABNORMAL

## 2025-01-13 ENCOUNTER — TRANSFERRED RECORDS (OUTPATIENT)
Dept: HEALTH INFORMATION MANAGEMENT | Facility: CLINIC | Age: 45
End: 2025-01-13

## 2025-01-13 ENCOUNTER — LAB REQUISITION (OUTPATIENT)
Dept: LAB | Facility: CLINIC | Age: 45
End: 2025-01-13
Payer: COMMERCIAL

## 2025-01-13 DIAGNOSIS — B37.0 CANDIDAL STOMATITIS: ICD-10-CM

## 2025-01-13 PROCEDURE — 87102 FUNGUS ISOLATION CULTURE: CPT | Mod: ORL | Performed by: OTOLARYNGOLOGY

## 2025-01-16 LAB — BACTERIA SPEC CULT: NORMAL

## 2025-01-17 LAB — BACTERIA SPEC CULT: NO GROWTH

## 2025-01-20 ENCOUNTER — HOSPITAL ENCOUNTER (OUTPATIENT)
Dept: MAMMOGRAPHY | Facility: CLINIC | Age: 45
Discharge: HOME | End: 2025-01-20
Attending: FAMILY MEDICINE | Admitting: FAMILY MEDICINE
Payer: COMMERCIAL

## 2025-01-20 DIAGNOSIS — Z12.31 VISIT FOR SCREENING MAMMOGRAM: ICD-10-CM

## 2025-01-20 PROCEDURE — 77063 BREAST TOMOSYNTHESIS BI: CPT

## 2025-01-20 PROCEDURE — 77067 SCR MAMMO BI INCL CAD: CPT

## 2025-03-10 ENCOUNTER — MYC MEDICAL ADVICE (OUTPATIENT)
Dept: FAMILY MEDICINE | Facility: CLINIC | Age: 45
End: 2025-03-10
Payer: COMMERCIAL

## 2025-04-17 NOTE — NURSING NOTE
"10w4d    Chief Complaint   Patient presents with     Prenatal Care       Initial /70 (BP Location: Right arm, Patient Position: Sitting, Cuff Size: Adult Regular)   Ht 1.645 m (5' 4.75\")   Wt 77.2 kg (170 lb 1.6 oz)   LMP 01/10/2019 (Exact Date)   BMI 28.53 kg/m   Estimated body mass index is 28.53 kg/m  as calculated from the following:    Height as of this encounter: 1.645 m (5' 4.75\").    Weight as of this encounter: 77.2 kg (170 lb 1.6 oz).  BP completed using cuff size: regular    Questioned patient about current smoking habits.  Pt. has never smoked.          The following HM Due: NONE             " 32.1

## 2025-05-29 ENCOUNTER — VIRTUAL VISIT (OUTPATIENT)
Dept: FAMILY MEDICINE | Facility: CLINIC | Age: 45
End: 2025-05-29
Payer: COMMERCIAL

## 2025-05-29 DIAGNOSIS — H93.8X1 EAR LUMP, RIGHT: Primary | ICD-10-CM

## 2025-05-29 DIAGNOSIS — E03.9 ACQUIRED HYPOTHYROIDISM: Primary | ICD-10-CM

## 2025-05-29 DIAGNOSIS — E03.9 ACQUIRED HYPOTHYROIDISM: ICD-10-CM

## 2025-05-29 PROCEDURE — 98004 SYNCH AUDIO-VIDEO EST SF 10: CPT | Performed by: FAMILY MEDICINE

## 2025-05-29 NOTE — PROGRESS NOTES
"Laurie is a 44 year old who is being evaluated via a billable video visit.    How would you like to obtain your AVS? MyChart  If the video visit is dropped, the invitation should be resent by: Text to cell phone: [phone]  Will anyone else be joining your video visit? No  {If patient encounters technical issues they should call [phone] :842424}    {PROVIDER CHARTING PREFERENCE:529023}    Subjective   Laurie is a 44 year old, presenting for the following health issues:  No chief complaint on file.    History of Present Illness       Reason for visit:  Lump in ear  Symptom onset:  3-7 days ago  Symptom intensity:  Mild  Had these symptoms before:  No   She is taking medications regularly.      Rt ear - has not changed in size   Pt denies bleeding, scabbing, scaling or pus       Right ear lobe - adjacent to piercing --    No pain        {SUPERLIST (Optional):143682}  {additonal problems for provider to add (Optional):014774}    {ROS Picklists (Optional):348990}      Objective           Vitals:  No vitals were obtained today due to virtual visit.    Physical Exam   {video visit exam brief selected:911681}    {Diagnostic Test Results (Optional):790292}      Video-Visit Details    Type of service:  Video Visit   Originating Location (pt. Location): {video visit patient location:978138::\"Home\"}  {PROVIDER LOCATION On-site should be selected for visits conducted from your clinic location or adjoining [facility] hospital, academic office, or other nearby [facility] building. Off-site should be selected for all other provider locations, including home:278842}  Distant Location (provider location):  {virtual location provider:191203}  Platform used for Video Visit: {Virtual Visit Platforms:592437::\"[email or web address]\"}  Signed Electronically by: Luciano Pedraza DO  {Email feedback regarding this note to [email or web address]   :143797}  " appropriate for age.  PSYCH: Appropriate affect, tone, and pace of words          Video-Visit Details    Type of service:  Video Visit   Originating Location (pt. Location): Home    Distant Location (provider location):  On-site  Platform used for Video Visit: Melissa    The longitudinal plan of care for the diagnosis(es)/condition(s) as documented were addressed during this visit. Due to the added complexity in care, I will continue to support Laurie in the subsequent management and with ongoing continuity of care.    Signed Electronically by: Luciano Pedraza DO

## 2025-06-03 ENCOUNTER — MYC MEDICAL ADVICE (OUTPATIENT)
Dept: FAMILY MEDICINE | Facility: CLINIC | Age: 45
End: 2025-06-03
Payer: COMMERCIAL

## 2025-06-04 ENCOUNTER — OFFICE VISIT (OUTPATIENT)
Dept: FAMILY MEDICINE | Facility: CLINIC | Age: 45
End: 2025-06-04
Payer: COMMERCIAL

## 2025-06-04 VITALS
DIASTOLIC BLOOD PRESSURE: 80 MMHG | OXYGEN SATURATION: 100 % | RESPIRATION RATE: 18 BRPM | HEIGHT: 65 IN | TEMPERATURE: 98.3 F | BODY MASS INDEX: 27.49 KG/M2 | SYSTOLIC BLOOD PRESSURE: 120 MMHG | WEIGHT: 165 LBS | HEART RATE: 112 BPM

## 2025-06-04 DIAGNOSIS — R11.0 NAUSEA: ICD-10-CM

## 2025-06-04 DIAGNOSIS — R42 DIZZINESS: ICD-10-CM

## 2025-06-04 DIAGNOSIS — R51.9 PRESSURE IN HEAD: Primary | ICD-10-CM

## 2025-06-04 PROCEDURE — 3079F DIAST BP 80-89 MM HG: CPT | Performed by: FAMILY MEDICINE

## 2025-06-04 PROCEDURE — 3074F SYST BP LT 130 MM HG: CPT | Performed by: FAMILY MEDICINE

## 2025-06-04 PROCEDURE — G2211 COMPLEX E/M VISIT ADD ON: HCPCS | Performed by: FAMILY MEDICINE

## 2025-06-04 PROCEDURE — 99213 OFFICE O/P EST LOW 20 MIN: CPT | Performed by: FAMILY MEDICINE

## 2025-06-04 NOTE — PROGRESS NOTES
"  Assessment & Plan     Pressure in head  - MR Brain w/o & w Contrast; Future    Dizziness  - MR Brain w/o & w Contrast; Future    Nausea  - MR Brain w/o & w Contrast; Prince Sullivan presents with a little over a week of dizziness described more like lightheadedness that is persistent but worsens with head movement. She also describes a squeezing head pressure and associated nausea. She had evaluation in  on 5/28/2025 which showed normal lab work. Discussed various causes for symptoms including viral infection, inflammatory process, allergies, orthostatics, arrhythmia, intracranial pathology. Fortunately, her neurological exam is normal. No abnormalities with HR and blood pressure is normal. No other upper respiratory infection/allergy symptoms. She does not typically get headaches and symptoms have persisted. As such, will plan on obtaining MRI for further evaluation. Will follow up based on MRI result.    BMI  Estimated body mass index is 27.46 kg/m  as calculated from the following:    Height as of this encounter: 1.651 m (5' 5\").    Weight as of this encounter: 74.8 kg (165 lb).     Alyse Sullivan is a 44 year old, presenting for the following health issues:  Ear Problem and ER F/U        6/4/2025     6:52 AM   Additional Questions   Roomed by Nereyda CRUZ   Accompanied by self     History of Present Illness       Reason for visit:  Lump in ear  Symptom onset:  3-7 days ago  Symptom intensity:  Mild  Had these symptoms before:  No   She is taking medications regularly.          ED/ Followup:    Facility:  [facility]  Date of visit: 05/28/2025  Reason for visit: Dizziness (Primary Dx);   Presyncope   Current Status: dizziness is better - patient still feels it a little bit- was not given any medication for it    She was sitting at her computer and started feeling dizzy -- not vertiginous --- more disequilibrium. She was seen at . Has had some ongoing dizziness and feels some pressure in her head, like " "someone is squeezing her head. She also has experienced nausea but no vomiting. Will notice the dizziness more if she moves her head around.  She is eating and drinking well. She initially felt flushed but not since then.         Review of Systems  Constitutional, HEENT, cardiovascular, pulmonary, gi and gu systems are negative, except as otherwise noted.      Objective    /80   Pulse 112   Temp 98.3  F (36.8  C) (Tympanic)   Resp 18   Ht 1.651 m (5' 5\")   Wt 74.8 kg (165 lb)   SpO2 100%   BMI 27.46 kg/m    Body mass index is 27.46 kg/m .    Physical Exam   GENERAL: alert and no distress  EYES: Eyes grossly normal to inspection, PERRL and conjunctivae and sclerae normal  HENT: ear canals and TM's normal, nose and mouth without ulcers or lesions  NECK: no adenopathy, no asymmetry, masses, or scars  RESP: lungs clear to auscultation - no rales, rhonchi or wheezes  CV: regular rates and rhythm, normal S1 S2, no S3 or S4, and no murmur, click or rub  MS: no gross musculoskeletal defects noted, no edema  NEURO: Normal strength and tone, sensory exam grossly normal, mentation intact, speech normal, cranial nerves 2-12 intact, and strength upper and lower extremities is 5/5; [place] testing negative to both sides  PSYCH: mentation appears normal, affect normal/bright    The longitudinal plan of care for the diagnosis(es)/condition(s) as documented were addressed during this visit. Due to the added complexity in care, I will continue to support Laurie in the subsequent management and with ongoing continuity of care.          Signed Electronically by: Luciano Pedraza DO    "

## 2025-06-11 ENCOUNTER — HOSPITAL ENCOUNTER (OUTPATIENT)
Dept: MRI IMAGING | Facility: CLINIC | Age: 45
Discharge: HOME | End: 2025-06-11
Attending: FAMILY MEDICINE
Payer: COMMERCIAL

## 2025-06-11 DIAGNOSIS — R42 DIZZINESS: ICD-10-CM

## 2025-06-11 DIAGNOSIS — R51.9 PRESSURE IN HEAD: ICD-10-CM

## 2025-06-11 DIAGNOSIS — R11.0 NAUSEA: ICD-10-CM

## 2025-06-11 PROCEDURE — A9585 GADOBUTROL INJECTION: HCPCS | Performed by: FAMILY MEDICINE

## 2025-06-11 PROCEDURE — 255N000002 HC RX 255 OP 636: Performed by: FAMILY MEDICINE

## 2025-06-11 PROCEDURE — 70553 MRI BRAIN STEM W/O & W/DYE: CPT

## 2025-06-11 RX ORDER — GADOBUTROL 604.72 MG/ML
7 INJECTION INTRAVENOUS ONCE
Status: COMPLETED | OUTPATIENT
Start: 2025-06-11 | End: 2025-06-11

## 2025-06-11 RX ADMIN — GADOBUTROL 7 ML: 604.72 INJECTION INTRAVENOUS at 20:43

## 2025-06-13 ENCOUNTER — RESULTS FOLLOW-UP (OUTPATIENT)
Dept: FAMILY MEDICINE | Facility: CLINIC | Age: 45
End: 2025-06-13

## 2025-06-23 ENCOUNTER — PATIENT OUTREACH (OUTPATIENT)
Dept: CARE COORDINATION | Facility: CLINIC | Age: 45
End: 2025-06-23
Payer: COMMERCIAL

## 2025-06-25 ENCOUNTER — PATIENT OUTREACH (OUTPATIENT)
Dept: CARE COORDINATION | Facility: CLINIC | Age: 45
End: 2025-06-25
Payer: COMMERCIAL

## 2025-06-25 DIAGNOSIS — E03.9 ACQUIRED HYPOTHYROIDISM: ICD-10-CM

## 2025-06-25 RX ORDER — LEVOTHYROXINE SODIUM 50 UG/1
50 TABLET ORAL DAILY
Qty: 30 TABLET | Refills: 0 | Status: SHIPPED | OUTPATIENT
Start: 2025-06-25

## 2025-07-19 SDOH — HEALTH STABILITY: PHYSICAL HEALTH: ON AVERAGE, HOW MANY MINUTES DO YOU ENGAGE IN EXERCISE AT THIS LEVEL?: 30 MIN

## 2025-07-19 SDOH — HEALTH STABILITY: PHYSICAL HEALTH: ON AVERAGE, HOW MANY DAYS PER WEEK DO YOU ENGAGE IN MODERATE TO STRENUOUS EXERCISE (LIKE A BRISK WALK)?: 3 DAYS

## 2025-07-19 ASSESSMENT — SOCIAL DETERMINANTS OF HEALTH (SDOH): HOW OFTEN DO YOU GET TOGETHER WITH FRIENDS OR RELATIVES?: ONCE A WEEK

## 2025-07-24 ENCOUNTER — OFFICE VISIT (OUTPATIENT)
Dept: FAMILY MEDICINE | Facility: CLINIC | Age: 45
End: 2025-07-24
Payer: COMMERCIAL

## 2025-07-24 VITALS
BODY MASS INDEX: 27.66 KG/M2 | SYSTOLIC BLOOD PRESSURE: 118 MMHG | WEIGHT: 166 LBS | RESPIRATION RATE: 18 BRPM | DIASTOLIC BLOOD PRESSURE: 78 MMHG | HEART RATE: 78 BPM | TEMPERATURE: 98.6 F | HEIGHT: 65 IN | OXYGEN SATURATION: 99 %

## 2025-07-24 DIAGNOSIS — E03.9 ACQUIRED HYPOTHYROIDISM: ICD-10-CM

## 2025-07-24 DIAGNOSIS — Z13.220 SCREENING FOR HYPERLIPIDEMIA: ICD-10-CM

## 2025-07-24 DIAGNOSIS — Z00.00 ROUTINE GENERAL MEDICAL EXAMINATION AT A HEALTH CARE FACILITY: Primary | ICD-10-CM

## 2025-07-24 DIAGNOSIS — Z13.1 SCREENING FOR DIABETES MELLITUS (DM): ICD-10-CM

## 2025-07-24 DIAGNOSIS — F41.9 ANXIETY: ICD-10-CM

## 2025-07-24 DIAGNOSIS — Z13.0 SCREENING FOR DEFICIENCY ANEMIA: ICD-10-CM

## 2025-07-24 LAB
ALBUMIN SERPL BCG-MCNC: 4.4 G/DL (ref 3.5–5.2)
ALP SERPL-CCNC: 62 U/L (ref 40–150)
ALT SERPL W P-5'-P-CCNC: 14 U/L (ref 0–50)
ANION GAP SERPL CALCULATED.3IONS-SCNC: 12 MMOL/L (ref 7–15)
AST SERPL W P-5'-P-CCNC: 19 U/L (ref 0–45)
BASOPHILS # BLD AUTO: 0.1 10E3/UL (ref 0–0.2)
BASOPHILS NFR BLD AUTO: 1 %
BILIRUB SERPL-MCNC: 1.2 MG/DL
BUN SERPL-MCNC: 12.7 MG/DL (ref 6–20)
CALCIUM SERPL-MCNC: 9.5 MG/DL (ref 8.8–10.4)
CHLORIDE SERPL-SCNC: 103 MMOL/L (ref 98–107)
CHOLEST SERPL-MCNC: 158 MG/DL
CREAT SERPL-MCNC: 0.59 MG/DL (ref 0.51–0.95)
EGFRCR SERPLBLD CKD-EPI 2021: >90 ML/MIN/1.73M2
EOSINOPHIL # BLD AUTO: 0.2 10E3/UL (ref 0–0.7)
EOSINOPHIL NFR BLD AUTO: 3 %
ERYTHROCYTE [DISTWIDTH] IN BLOOD BY AUTOMATED COUNT: 12 % (ref 10–15)
EST. AVERAGE GLUCOSE BLD GHB EST-MCNC: 97 MG/DL
FASTING STATUS PATIENT QL REPORTED: YES
FASTING STATUS PATIENT QL REPORTED: YES
GLUCOSE SERPL-MCNC: 88 MG/DL (ref 70–99)
HBA1C MFR BLD: 5 % (ref 0–5.6)
HCO3 SERPL-SCNC: 24 MMOL/L (ref 22–29)
HCT VFR BLD AUTO: 40.5 % (ref 35–47)
HDLC SERPL-MCNC: 76 MG/DL
HGB BLD-MCNC: 13.8 G/DL (ref 11.7–15.7)
IMM GRANULOCYTES # BLD: 0 10E3/UL
IMM GRANULOCYTES NFR BLD: 0 %
LDLC SERPL CALC-MCNC: 63 MG/DL
LYMPHOCYTES # BLD AUTO: 3.2 10E3/UL (ref 0.8–5.3)
LYMPHOCYTES NFR BLD AUTO: 35 %
MCH RBC QN AUTO: 31.6 PG (ref 26.5–33)
MCHC RBC AUTO-ENTMCNC: 34.1 G/DL (ref 31.5–36.5)
MCV RBC AUTO: 93 FL (ref 78–100)
MONOCYTES # BLD AUTO: 0.8 10E3/UL (ref 0–1.3)
MONOCYTES NFR BLD AUTO: 8 %
NEUTROPHILS # BLD AUTO: 5.1 10E3/UL (ref 1.6–8.3)
NEUTROPHILS NFR BLD AUTO: 54 %
NONHDLC SERPL-MCNC: 82 MG/DL
PLATELET # BLD AUTO: 300 10E3/UL (ref 150–450)
POTASSIUM SERPL-SCNC: 4.6 MMOL/L (ref 3.4–5.3)
PROT SERPL-MCNC: 6.9 G/DL (ref 6.4–8.3)
RBC # BLD AUTO: 4.37 10E6/UL (ref 3.8–5.2)
SODIUM SERPL-SCNC: 139 MMOL/L (ref 135–145)
TRIGL SERPL-MCNC: 96 MG/DL
TSH SERPL DL<=0.005 MIU/L-ACNC: 2.85 UIU/ML (ref 0.3–4.2)
WBC # BLD AUTO: 9.3 10E3/UL (ref 4–11)

## 2025-07-24 RX ORDER — NORETHINDRONE ACETATE AND ETHINYL ESTRADIOL 20; 1 UG/1; MG/1
1 TABLET ORAL DAILY
COMMUNITY

## 2025-07-24 ASSESSMENT — ANXIETY QUESTIONNAIRES
8. IF YOU CHECKED OFF ANY PROBLEMS, HOW DIFFICULT HAVE THESE MADE IT FOR YOU TO DO YOUR WORK, TAKE CARE OF THINGS AT HOME, OR GET ALONG WITH OTHER PEOPLE?: SOMEWHAT DIFFICULT
3. WORRYING TOO MUCH ABOUT DIFFERENT THINGS: SEVERAL DAYS
GAD7 TOTAL SCORE: 5
1. FEELING NERVOUS, ANXIOUS, OR ON EDGE: SEVERAL DAYS
6. BECOMING EASILY ANNOYED OR IRRITABLE: NOT AT ALL
IF YOU CHECKED OFF ANY PROBLEMS ON THIS QUESTIONNAIRE, HOW DIFFICULT HAVE THESE PROBLEMS MADE IT FOR YOU TO DO YOUR WORK, TAKE CARE OF THINGS AT HOME, OR GET ALONG WITH OTHER PEOPLE: SOMEWHAT DIFFICULT
GAD7 TOTAL SCORE: 5
7. FEELING AFRAID AS IF SOMETHING AWFUL MIGHT HAPPEN: SEVERAL DAYS
5. BEING SO RESTLESS THAT IT IS HARD TO SIT STILL: NOT AT ALL
4. TROUBLE RELAXING: SEVERAL DAYS
7. FEELING AFRAID AS IF SOMETHING AWFUL MIGHT HAPPEN: SEVERAL DAYS
GAD7 TOTAL SCORE: 5
2. NOT BEING ABLE TO STOP OR CONTROL WORRYING: SEVERAL DAYS

## 2025-07-24 ASSESSMENT — PATIENT HEALTH QUESTIONNAIRE - PHQ9
SUM OF ALL RESPONSES TO PHQ QUESTIONS 1-9: 4
10. IF YOU CHECKED OFF ANY PROBLEMS, HOW DIFFICULT HAVE THESE PROBLEMS MADE IT FOR YOU TO DO YOUR WORK, TAKE CARE OF THINGS AT HOME, OR GET ALONG WITH OTHER PEOPLE: NOT DIFFICULT AT ALL
SUM OF ALL RESPONSES TO PHQ QUESTIONS 1-9: 4

## 2025-07-24 NOTE — PROGRESS NOTES
"Preventive Care Visit  [facility] [place]  Luciano Pedraza DO, Family Medicine  Jul 24, 2025      Assessment & Plan     Routine general medical examination at a health care facility  Health maintenance reviewed and updated. Emphasized importance of balanced diet and regular exercise.      Anxiety  Stable with sertraline. Working with GI on ongoing globus sensation (planning for possible endoscopy and barium swallow studies). If negative testing, we could consider increasing sertraline dose as this could be a manifestation of anxiety as well.   - sertraline (ZOLOFT) 50 MG tablet; Take 1 tablet (50 mg) by mouth daily.    Screening for deficiency anemia  - CBC with platelets and differential; Future  - CBC with platelets and differential    Acquired hypothyroidism  Stable, recheck TSH and adjust levothyroxine accordingly.  - TSH with free T4 reflex; Future  - TSH with free T4 reflex    Screening for diabetes mellitus (DM)  - Comprehensive metabolic panel (BMP + Alb, Alk Phos, ALT, AST, Total. Bili, TP); Future  - Hemoglobin A1c; Future  - Comprehensive metabolic panel (BMP + Alb, Alk Phos, ALT, AST, Total. Bili, TP)  - Hemoglobin A1c    Screening for hyperlipidemia  - Lipid panel reflex to direct LDL Fasting; Future  - Lipid panel reflex to direct LDL Fasting  Patient has been advised of split billing requirements and indicates understanding: Yes    BMI  Estimated body mass index is 27.59 kg/m  as calculated from the following:    Height as of this encounter: 1.652 m (5' 5.04\").    Weight as of this encounter: 75.3 kg (166 lb).       Counseling  Appropriate preventive services were addressed with this patient via screening, questionnaire, or discussion as appropriate for fall prevention, nutrition, physical activity, Tobacco-use cessation, social engagement, weight loss and cognition.  Checklist reviewing preventive services available has been given to the patient.  Reviewed patient's diet, " addressing concerns and/or questions.   She is at risk for lack of exercise and has been provided with information to increase physical activity for the benefit of her well-being.   She is at risk for psychosocial distress and has been provided with information to reduce risk.   Reviewed preventive health counseling, as reflected in patient instructions      Subjective   Laurie is a 44 year old, presenting for the following:  Physical        7/24/2025     6:56 AM   Additional Questions   Roomed by guille SHEFFIELD   Accompanied by self          HPI      Hypothyroidism Follow-up    Since last visit, patient describes the following symptoms: Weight stable, no hair loss, no skin changes, no constipation, no loose stools      Advance Care Planning    Discussed advance care planning with patient; however, patient declined at this time.        7/19/2025   General Health   How would you rate your overall physical health? (!) FAIR   Feel stress (tense, anxious, or unable to sleep) Rather much         7/19/2025   Nutrition   Three or more servings of calcium each day? Yes   Diet: Regular (no restrictions)   How many servings of fruit and vegetables per day? (!) 2-3   How many sweetened beverages each day? 0-1         7/19/2025   Exercise   Days per week of moderate/strenous exercise 3 days   Average minutes spent exercising at this level 30 min         7/19/2025   Social Factors   Frequency of gathering with friends or relatives Once a week   Worry food won't last until get money to buy more No   Food not last or not have enough money for food? No   Do you have housing? (Housing is defined as stable permanent housing and does not include staying outside in a car, in a tent, in an abandoned building, in an overnight shelter, or couch-surfing.) Yes   Are you worried about losing your housing? No   Lack of transportation? No   Unable to get utilities (heat,electricity)? No         7/19/2025   Dental   Dentist two times every year? Yes        Today's PHQ-9 Score:       7/24/2025     6:52 AM   PHQ-9 SCORE   PHQ-9 Total Score MyChart 4 (Minimal depression)   PHQ-9 Total Score 4        Patient-reported           7/19/2025   Substance Use   Alcohol more than 3/day or more than 7/wk No   Do you use any other substances recreationally? No       Social History     Tobacco Use    Smoking status: Never    Smokeless tobacco: Never   Vaping Use    Vaping status: Never Used   Substance Use Topics    Alcohol use: Yes     Comment: infrequent    Drug use: No           1/20/2025   LAST FHS-7 RESULTS   1st degree relative breast or ovarian cancer No    No   Any relative bilateral breast cancer No    No   Any male have breast cancer No    No   Any ONE woman have BOTH breast AND ovarian cancer No    No   Any woman with breast cancer before 50yrs No    No   2 or more relatives with breast AND/OR ovarian cancer No    No   2 or more relatives with breast AND/OR bowel cancer No    No       Multiple values from one day are sorted in reverse-chronological order   Mammogram Screening - Mammogram every 1-2 years updated in Health Maintenance based on mutual decision making        7/19/2025   STI Screening   New sexual partner(s) since last STI/HIV test? No     History of abnormal Pap smear: No - age 30- 64 PAP with HPV every 5 years recommended        Latest Ref Rng & Units 7/17/2023     2:50 PM 8/11/2022    10:37 AM 11/20/2018     5:11 PM   PAP / HPV   PAP  Negative for Intraepithelial Lesion or Malignancy (NILM)  Negative for Intraepithelial Lesion or Malignancy (NILM)     HPV 16 DNA Negative Negative  Negative  Negative    HPV 18 DNA Negative Negative  Negative  Negative    Other HR HPV Negative Negative  Negative  Negative      ASCVD Risk   The 10-year ASCVD risk score (Kylie WADDELL, et al., 2019) is: 0.3%    Values used to calculate the score:      Age: 44 years      Sex: Female      Is Non- : No      Diabetic: No      Tobacco smoker: No     "  Systolic Blood Pressure: 118 mmHg      Is BP treated: No      HDL Cholesterol: 63 mg/dL      Total Cholesterol: 161 mg/dL        7/19/2025   Contraception/Family Planning   Questions about contraception or family planning No   What are your periods like? Regular     Reviewed and updated as needed this visit by Provider                    Past Medical History:   Diagnosis Date    Anxiety 09/19/2018    Complication of anesthesia     Hypothyroidism     Motion sickness     Paresthesia     PONV (postoperative nausea and vomiting)     Rectal bleeding - resolved, likely secondary to fissure 05/06/2019     Past Surgical History:   Procedure Laterality Date    COLONOSCOPY  August 2021    COLONOSCOPY N/A 11/27/2024    Procedure: Colonoscopy;  Surgeon: Monica Garcias MD;  Location: [facility]    DAVINCI SALPINGECTOMY Left 11/24/2020    Procedure: Robotic assisted laparoscopic left ovarian cystectomy, bilateral salpingectomy;  Surgeon: Rosalba Pierre MD;  Location: [facility] OR    WRIST SURGERY  08/2015    Wrist surgery for torn ligaments       Review of Systems  Constitutional, HEENT, cardiovascular, pulmonary, gi and gu systems are negative, except as otherwise noted.       Objective    Exam  /78   Pulse 78   Temp 98.6  F (37  C) (Tympanic)   Resp 18   Ht 1.652 m (5' 5.04\")   Wt 75.3 kg (166 lb)   LMP 07/13/2025 (Approximate)   SpO2 99%   BMI 27.59 kg/m     Estimated body mass index is 27.59 kg/m  as calculated from the following:    Height as of this encounter: 1.652 m (5' 5.04\").    Weight as of this encounter: 75.3 kg (166 lb).    Physical Exam  GENERAL: alert and no distress  EYES: Eyes grossly normal to inspection  HENT: ear canals and TM's normal, nose and mouth without ulcers or lesions  NECK: no adenopathy, no asymmetry, masses, or scars  RESP: lungs clear to auscultation - no rales, rhonchi or wheezes  CV: regular rate and rhythm, normal S1 S2, no S3 or S4, no murmur, click or rub, no peripheral " edema  MS: no gross musculoskeletal defects noted, no edema  SKIN: no suspicious lesions or rashes  PSYCH: mentation appears normal, affect normal/bright        Signed Electronically by: Luciano Pedraza, DO    Answers submitted by the patient for this visit:  Patient Health Questionnaire (Submitted on 7/24/2025)  If you checked off any problems, how difficult have these problems made it for you to do your work, take care of things at home, or get along with other people?: Not difficult at all  PHQ9 TOTAL SCORE: 4  Patient Health Questionnaire (G7) (Submitted on 7/24/2025)  JAKE 7 TOTAL SCORE: 5

## 2025-07-24 NOTE — PATIENT INSTRUCTIONS
Patient Education   Preventive Care Advice   This is general advice given by our system to help you stay healthy. However, your care team may have specific advice just for you. Please talk to your care team about your preventive care needs.  Nutrition  Eat 5 or more servings of fruits and vegetables each day.  Try wheat bread, brown rice and whole grain pasta (instead of white bread, rice, and pasta).  Get enough calcium and vitamin D. Check the label on foods and aim for 100% of the RDA (recommended daily allowance).  Lifestyle  Exercise at least 150 minutes each week  (30 minutes a day, 5 days a week).  Do muscle strengthening activities 2 days a week. These help control your weight and prevent disease.  No smoking.  Wear sunscreen to prevent skin cancer.  Have a dental exam and cleaning every 6 months.  Yearly exams  See your health care team every year to talk about:  Any changes in your health.  Any medicines your care team has prescribed.  Preventive care, family planning, and ways to prevent chronic diseases.  Shots (vaccines)   HPV shots (up to age 26), if you've never had them before.  Hepatitis B shots (up to age 59), if you've never had them before.  COVID-19 shot: Get this shot when it's due.  Flu shot: Get a flu shot every year.  Tetanus shot: Get a tetanus shot every 10 years.  Pneumococcal, hepatitis A, and RSV shots: Ask your care team if you need these based on your risk.  Shingles shot (for age 50 and up)  General health tests  Diabetes screening:  Starting at age 35, Get screened for diabetes at least every 3 years.  If you are younger than age 35, ask your care team if you should be screened for diabetes.  Cholesterol test: At age 39, start having a cholesterol test every 5 years, or more often if advised.  Bone density scan (DEXA): At age 50, ask your care team if you should have this scan for osteoporosis (brittle bones).  Hepatitis C: Get tested at least once in your life.  STIs (sexually  transmitted infections)  Before age 24: Ask your care team if you should be screened for STIs.  After age 24: Get screened for STIs if you're at risk. You are at risk for STIs (including HIV) if:  You are sexually active with more than one person.  You don't use condoms every time.  You or a partner was diagnosed with a sexually transmitted infection.  If you are at risk for HIV, ask about PrEP medicine to prevent HIV.  Get tested for HIV at least once in your life, whether you are at risk for HIV or not.  Cancer screening tests  Cervical cancer screening: If you have a cervix, begin getting regular cervical cancer screening tests starting at age 21.  Breast cancer scan (mammogram): If you've ever had breasts, begin having regular mammograms starting at age 40. This is a scan to check for breast cancer.  Colon cancer screening: It is important to start screening for colon cancer at age 45.  Have a colonoscopy test every 10 years (or more often if you're at risk) Or, ask your provider about stool tests like a FIT test every year or Cologuard test every 3 years.  To learn more about your testing options, visit:   .  For help making a decision, visit:   https://bit.ly/cu41998.  Prostate cancer screening test: If you have a prostate, ask your care team if a prostate cancer screening test (PSA) at age 55 is right for you.  Lung cancer screening: If you are a current or former smoker ages 50 to 80, ask your care team if ongoing lung cancer screenings are right for you.  For informational purposes only. Not to replace the advice of your health care provider. Copyright   2023 [facility]. All rights reserved. Clinically reviewed by the [facility]. [employer] 444954 - REV 01/24.  Learning About Stress  What is stress?     Stress is your body's response to a hard situation. Your body can have a physical, emotional, or mental response. Stress is a fact of life for most people, and it  affects everyone differently. What causes stress for you may not be stressful for someone else.  A lot of things can cause stress. You may feel stress when you go on a job interview, take a test, or run a race. This kind of short-term stress is normal and even useful. It can help you if you need to work hard or react quickly. For example, stress can help you finish an important job on time.  Long-term stress is caused by ongoing stressful situations or events. Examples of long-term stress include long-term health problems, ongoing problems at work, or conflicts in your family. Long-term stress can harm your health.  How does stress affect your health?  When you are stressed, your body responds as though you are in danger. It makes hormones that speed up your heart, make you breathe faster, and give you a burst of energy. This is called the fight-or-flight stress response. If the stress is over quickly, your body goes back to normal and no harm is done.  But if stress happens too often or lasts too long, it can have bad effects. Long-term stress can make you more likely to get sick, and it can make symptoms of some diseases worse. If you tense up when you are stressed, you may develop neck, shoulder, or low back pain. Stress is linked to high blood pressure and heart disease.  Stress also harms your emotional health. It can make you guardado, tense, or depressed. Your relationships may suffer, and you may not do well at work or school.  What can you do to manage stress?  You can try these things to help manage stress:   Do something active. Exercise or activity can help reduce stress. Walking is a great way to get started. Even everyday activities such as housecleaning or yard work can help.  Try yoga or chandler chi. These techniques combine exercise and meditation. You may need some training at first to learn them.  Do something you enjoy. For example, listen to music or go to a movie. Practice your hobby or do volunteer  "work.  Meditate. This can help you relax, because you are not worrying about what happened before or what may happen in the future.  Do guided imagery. Imagine yourself in any setting that helps you feel calm. You can use online videos, books, or a teacher to guide you.  Do breathing exercises. For example:  From a standing position, bend forward from the waist with your knees slightly bent. Let your arms dangle close to the floor.  Breathe in slowly and deeply as you return to a standing position. Roll up slowly and lift your head last.  Hold your breath for just a few seconds in the standing position.  Breathe out slowly and bend forward from the waist.  Let your feelings out. Talk, laugh, cry, and express anger when you need to. Talking with supportive friends or family, a counselor, or a laurent leader about your feelings is a healthy way to relieve stress. Avoid discussing your feelings with people who make you feel worse.  Write. It may help to write about things that are bothering you. This helps you find out how much stress you feel and what is causing it. When you know this, you can find better ways to cope.  What can you do to prevent stress?  You might try some of these things to help prevent stress:  Manage your time. This helps you find time to do the things you want and need to do.  Get enough sleep. Your body recovers from the stresses of the day while you are sleeping.  Get support. Your family, friends, and community can make a difference in how you experience stress.  Limit your news feed. Avoid or limit time on social media or news that may make you feel stressed.  Do something active. Exercise or activity can help reduce stress. Walking is a great way to get started.  Where can you learn more?  Go to https://www.[employer].com.net/patiented  Enter N032 in the search box to learn more about \"Learning About Stress.\"  Current as of: October 24, 2024  Content Version: 14.5 2024-2025 [employer]   Care instructions adapted under license by your healthcare professional. If you have questions about a medical condition or this instruction, always ask your healthcare professional. [employer] disclaims any warranty or liability for your use of this information.

## 2025-07-26 DIAGNOSIS — E03.9 ACQUIRED HYPOTHYROIDISM: ICD-10-CM

## 2025-07-28 RX ORDER — LEVOTHYROXINE SODIUM 50 UG/1
50 TABLET ORAL DAILY
Qty: 90 TABLET | Refills: 3 | Status: SHIPPED | OUTPATIENT
Start: 2025-07-28

## 2025-08-05 ENCOUNTER — MYC MEDICAL ADVICE (OUTPATIENT)
Dept: FAMILY MEDICINE | Facility: CLINIC | Age: 45
End: 2025-08-05
Payer: COMMERCIAL

## 2025-08-05 DIAGNOSIS — R06.83 SNORING: Primary | ICD-10-CM

## 2025-08-07 ENCOUNTER — PATIENT OUTREACH (OUTPATIENT)
Dept: CARE COORDINATION | Facility: CLINIC | Age: 45
End: 2025-08-07
Payer: COMMERCIAL

## 2025-08-11 ENCOUNTER — PATIENT OUTREACH (OUTPATIENT)
Dept: CARE COORDINATION | Facility: CLINIC | Age: 45
End: 2025-08-11
Payer: COMMERCIAL

## 2025-08-19 ENCOUNTER — TRANSFERRED RECORDS (OUTPATIENT)
Dept: HEALTH INFORMATION MANAGEMENT | Facility: CLINIC | Age: 45
End: 2025-08-19
Payer: COMMERCIAL

## (undated) DEVICE — KIT PATIENT POSITIONING PIGAZZI LATEX FREE 40580

## (undated) DEVICE — DAVINCI OBTURATOR 8MM BLADELESS 420023

## (undated) DEVICE — LUBRICANT INST ELECTROLUBE EL101

## (undated) DEVICE — DAVINCI S CANNULA SEAL 8.5-13MM 420206

## (undated) DEVICE — SUCTION MANIFOLD NEPTUNE 2 SYS 4 PORT 0702-020-000

## (undated) DEVICE — PACK DAVINCI GYN SMA15GDFS1

## (undated) DEVICE — LINEN ORTHO PACK 5446

## (undated) DEVICE — SUCTION IRR STRYKERFLOW II W/TIP 250-070-520

## (undated) DEVICE — TUBING CONMED AIRSEAL SMOKE EVAC INSUFFLATION ASM-EVAC

## (undated) DEVICE — GLOVE PROTEXIS POWDER FREE SMT 7.0  2D72PT70X

## (undated) DEVICE — ESU GROUND PAD ADULT W/CORD E7507

## (undated) DEVICE — SOL NACL 0.9% INJ 1000ML BAG 2B1324X

## (undated) DEVICE — ENDO POUCH UNIVERSAL RETRIEVAL SYSTEM INZII 5MM CD003

## (undated) DEVICE — GLOVE PROTEXIS BLUE W/NEU-THERA 7.0  2D73EB70

## (undated) DEVICE — ADH SKIN CLOSURE PREMIERPRO EXOFIN 1.0ML 3470

## (undated) DEVICE — CATH TRAY FOLEY SURESTEP 16FR DRAIN BAG STATOCK A899916

## (undated) DEVICE — ESU CORD MONOPOLAR 10'  E0510

## (undated) DEVICE — ESU CORD BIPOLAR GREEN 10-4000

## (undated) DEVICE — ENDO TROCAR CONMED AIRSEAL BLADELESS 05X120MM IAS5-120LP

## (undated) DEVICE — DAVINCI HOT SHEARS TIP COVER  400180

## (undated) DEVICE — SU VICRYL 4-0 PS-2 18" UND J496H

## (undated) DEVICE — DAVINCI SI DRAPE ACCESSORY KIT 3-ARM 420290

## (undated) DEVICE — PREP CHLORAPREP 26ML TINTED HI-LITE ORANGE 930815

## (undated) DEVICE — TUBING IV EXTENSION SET ANESTHESIA 34" MLL 2C6227

## (undated) DEVICE — PAD CHUX UNDERPAD 30X36" P3036C

## (undated) RX ORDER — PROPOFOL 10 MG/ML
INJECTION, EMULSION INTRAVENOUS
Status: DISPENSED
Start: 2020-11-24

## (undated) RX ORDER — ONDANSETRON 2 MG/ML
INJECTION INTRAMUSCULAR; INTRAVENOUS
Status: DISPENSED
Start: 2024-11-27

## (undated) RX ORDER — LABETALOL 20 MG/4 ML (5 MG/ML) INTRAVENOUS SYRINGE
Status: DISPENSED
Start: 2020-11-24

## (undated) RX ORDER — SIMETHICONE 40MG/0.6ML
SUSPENSION, DROPS(FINAL DOSAGE FORM)(ML) ORAL
Status: DISPENSED
Start: 2024-11-27

## (undated) RX ORDER — LIDOCAINE HYDROCHLORIDE 10 MG/ML
INJECTION, SOLUTION EPIDURAL; INFILTRATION; INTRACAUDAL; PERINEURAL
Status: DISPENSED
Start: 2020-11-24

## (undated) RX ORDER — BUPIVACAINE HYDROCHLORIDE 5 MG/ML
INJECTION, SOLUTION EPIDURAL; INTRACAUDAL
Status: DISPENSED
Start: 2020-11-24

## (undated) RX ORDER — GLYCOPYRROLATE 0.2 MG/ML
INJECTION INTRAMUSCULAR; INTRAVENOUS
Status: DISPENSED
Start: 2020-11-24

## (undated) RX ORDER — FENTANYL CITRATE 50 UG/ML
INJECTION, SOLUTION INTRAMUSCULAR; INTRAVENOUS
Status: DISPENSED
Start: 2020-11-24

## (undated) RX ORDER — PHENAZOPYRIDINE HYDROCHLORIDE 200 MG/1
TABLET, FILM COATED ORAL
Status: DISPENSED
Start: 2020-11-24

## (undated) RX ORDER — NEOSTIGMINE METHYLSULFATE 1 MG/ML
VIAL (ML) INJECTION
Status: DISPENSED
Start: 2020-11-24

## (undated) RX ORDER — FENTANYL CITRATE 50 UG/ML
INJECTION, SOLUTION INTRAMUSCULAR; INTRAVENOUS
Status: DISPENSED
Start: 2024-11-27

## (undated) RX ORDER — SCOLOPAMINE TRANSDERMAL SYSTEM 1 MG/1
PATCH, EXTENDED RELEASE TRANSDERMAL
Status: DISPENSED
Start: 2020-11-24

## (undated) RX ORDER — CEFAZOLIN SODIUM 2 G/100ML
INJECTION, SOLUTION INTRAVENOUS
Status: DISPENSED
Start: 2020-11-24

## (undated) RX ORDER — DEXAMETHASONE SODIUM PHOSPHATE 4 MG/ML
INJECTION, SOLUTION INTRA-ARTICULAR; INTRALESIONAL; INTRAMUSCULAR; INTRAVENOUS; SOFT TISSUE
Status: DISPENSED
Start: 2020-11-24

## (undated) RX ORDER — OXYCODONE HYDROCHLORIDE 5 MG/1
TABLET ORAL
Status: DISPENSED
Start: 2020-11-24

## (undated) RX ORDER — KETOROLAC TROMETHAMINE 30 MG/ML
INJECTION, SOLUTION INTRAMUSCULAR; INTRAVENOUS
Status: DISPENSED
Start: 2020-11-24

## (undated) RX ORDER — ONDANSETRON 2 MG/ML
INJECTION INTRAMUSCULAR; INTRAVENOUS
Status: DISPENSED
Start: 2020-11-24